# Patient Record
Sex: FEMALE | Race: WHITE | Employment: STUDENT | URBAN - METROPOLITAN AREA
[De-identification: names, ages, dates, MRNs, and addresses within clinical notes are randomized per-mention and may not be internally consistent; named-entity substitution may affect disease eponyms.]

---

## 2018-07-03 PROBLEM — O09.92 SUPERVISION OF HIGH RISK PREGNANCY IN SECOND TRIMESTER: Status: ACTIVE | Noted: 2018-07-03

## 2018-07-03 PROBLEM — O99.212 OBESITY AFFECTING PREGNANCY IN SECOND TRIMESTER: Status: ACTIVE | Noted: 2018-07-03

## 2018-07-03 PROBLEM — O09.30 LATE PRENATAL CARE: Status: ACTIVE | Noted: 2018-07-03

## 2018-07-07 PROBLEM — O09.899 MATERNAL VARICELLA, NON-IMMUNE: Status: ACTIVE | Noted: 2018-07-07

## 2018-07-07 PROBLEM — O09.899 RUBELLA NON-IMMUNE STATUS, ANTEPARTUM: Status: ACTIVE | Noted: 2018-07-07

## 2018-07-07 PROBLEM — Z28.39 RUBELLA NON-IMMUNE STATUS, ANTEPARTUM: Status: ACTIVE | Noted: 2018-07-07

## 2018-07-07 PROBLEM — Z28.39 MATERNAL VARICELLA, NON-IMMUNE: Status: ACTIVE | Noted: 2018-07-07

## 2018-08-21 PROBLEM — O09.93 SUPERVISION OF HIGH RISK PREGNANCY IN THIRD TRIMESTER: Status: ACTIVE | Noted: 2018-07-03

## 2018-08-24 ENCOUNTER — HOSPITAL ENCOUNTER (EMERGENCY)
Age: 23
Discharge: HOME | End: 2018-08-24
Attending: OBSTETRICS & GYNECOLOGY | Admitting: OBSTETRICS & GYNECOLOGY
Payer: MEDICAID

## 2018-08-24 ENCOUNTER — HOSPITAL ENCOUNTER (EMERGENCY)
Age: 23
Discharge: HOME | End: 2018-08-24
Attending: EMERGENCY MEDICINE
Payer: MEDICAID

## 2018-08-24 PROBLEM — K80.20 CHOLECYSTOLITHIASIS AFFECTING PREGNANCY: Status: ACTIVE | Noted: 2018-08-24

## 2018-08-24 PROBLEM — O99.619 CHOLECYSTOLITHIASIS AFFECTING PREGNANCY: Status: ACTIVE | Noted: 2018-08-24

## 2018-08-24 LAB
ALBUMIN SERPL-MCNC: 2.6 G/DL (ref 3.4–5)
ALBUMIN/GLOB SERPL: 0.6 {RATIO} (ref 0.8–1.7)
ALP SERPL-CCNC: 117 U/L (ref 45–117)
ALT SERPL-CCNC: 331 U/L (ref 13–56)
AMYLASE SERPL-CCNC: 44 U/L (ref 25–115)
ANION GAP SERPL CALC-SCNC: 7 MMOL/L (ref 3–18)
APPEARANCE UR: CLEAR
AST SERPL-CCNC: 97 U/L (ref 15–37)
BILIRUB SERPL-MCNC: 0.3 MG/DL (ref 0.2–1)
BILIRUB UR QL: NEGATIVE
BUN SERPL-MCNC: 7 MG/DL (ref 7–18)
BUN/CREAT SERPL: 8 (ref 12–20)
CALCIUM SERPL-MCNC: 8.8 MG/DL (ref 8.5–10.1)
CHLORIDE SERPL-SCNC: 105 MMOL/L (ref 100–108)
CO2 SERPL-SCNC: 27 MMOL/L (ref 21–32)
COLOR UR: NORMAL
CREAT SERPL-MCNC: 0.84 MG/DL (ref 0.6–1.3)
ERYTHROCYTE [DISTWIDTH] IN BLOOD BY AUTOMATED COUNT: 13.3 % (ref 11.6–14.5)
GLOBULIN SER CALC-MCNC: 4.4 G/DL (ref 2–4)
GLUCOSE SERPL-MCNC: 93 MG/DL (ref 74–99)
GLUCOSE UR QL STRIP.AUTO: NEGATIVE MG/DL
HCT VFR BLD AUTO: 32.1 % (ref 35–45)
HGB BLD-MCNC: 10.7 G/DL (ref 12–16)
KETONES UR-MCNC: NEGATIVE MG/DL
LDH SERPL L TO P-CCNC: 130 U/L (ref 81–234)
LEUKOCYTE ESTERASE UR QL STRIP: NEGATIVE
LIPASE SERPL-CCNC: 147 U/L (ref 73–393)
MCH RBC QN AUTO: 29.1 PG (ref 24–34)
MCHC RBC AUTO-ENTMCNC: 33.3 G/DL (ref 31–37)
MCV RBC AUTO: 87.2 FL (ref 74–97)
NITRITE UR QL: NEGATIVE
PH UR: 7 [PH] (ref 5–9)
PLATELET # BLD AUTO: 322 K/UL (ref 135–420)
PMV BLD AUTO: 10.2 FL (ref 9.2–11.8)
POTASSIUM SERPL-SCNC: 3.5 MMOL/L (ref 3.5–5.5)
PROT SERPL-MCNC: 7 G/DL (ref 6.4–8.2)
PROT UR QL: NEGATIVE MG/DL
RBC # BLD AUTO: 3.68 M/UL (ref 4.2–5.3)
RBC # UR STRIP: NEGATIVE /UL
SERVICE CMNT-IMP: NORMAL
SODIUM SERPL-SCNC: 139 MMOL/L (ref 136–145)
SP GR UR: 1.02 (ref 1–1.02)
URATE SERPL-MCNC: 3.6 MG/DL (ref 2.6–7.2)
UROBILINOGEN UR QL: 1 EU/DL (ref 0.2–1)
WBC # BLD AUTO: 9.2 K/UL (ref 4.6–13.2)

## 2018-08-24 PROCEDURE — 82239 BILE ACIDS TOTAL: CPT | Performed by: OBSTETRICS & GYNECOLOGY

## 2018-08-24 PROCEDURE — 59025 FETAL NON-STRESS TEST: CPT

## 2018-08-24 PROCEDURE — 84550 ASSAY OF BLOOD/URIC ACID: CPT | Performed by: OBSTETRICS & GYNECOLOGY

## 2018-08-24 PROCEDURE — 75810000275 HC EMERGENCY DEPT VISIT NO LEVEL OF CARE

## 2018-08-24 PROCEDURE — 80053 COMPREHEN METABOLIC PANEL: CPT | Performed by: OBSTETRICS & GYNECOLOGY

## 2018-08-24 PROCEDURE — 99284 EMERGENCY DEPT VISIT MOD MDM: CPT

## 2018-08-24 PROCEDURE — 82150 ASSAY OF AMYLASE: CPT | Performed by: OBSTETRICS & GYNECOLOGY

## 2018-08-24 PROCEDURE — 83615 LACTATE (LD) (LDH) ENZYME: CPT | Performed by: OBSTETRICS & GYNECOLOGY

## 2018-08-24 PROCEDURE — 85027 COMPLETE CBC AUTOMATED: CPT | Performed by: OBSTETRICS & GYNECOLOGY

## 2018-08-24 PROCEDURE — 74011250636 HC RX REV CODE- 250/636: Performed by: OBSTETRICS & GYNECOLOGY

## 2018-08-24 PROCEDURE — 83690 ASSAY OF LIPASE: CPT | Performed by: OBSTETRICS & GYNECOLOGY

## 2018-08-24 PROCEDURE — 96372 THER/PROPH/DIAG INJ SC/IM: CPT

## 2018-08-24 PROCEDURE — 81003 URINALYSIS AUTO W/O SCOPE: CPT

## 2018-08-24 RX ORDER — BETAMETHASONE SODIUM PHOSPHATE AND BETAMETHASONE ACETATE 3; 3 MG/ML; MG/ML
12 INJECTION, SUSPENSION INTRA-ARTICULAR; INTRALESIONAL; INTRAMUSCULAR; SOFT TISSUE EVERY 24 HOURS
Status: DISCONTINUED | OUTPATIENT
Start: 2018-08-24 | End: 2018-08-24 | Stop reason: HOSPADM

## 2018-08-24 RX ADMIN — BETAMETHASONE ACETATE AND BETAMETHASONE SODIUM PHOSPHATE 12 MG: 3; 3 INJECTION, SUSPENSION INTRA-ARTICULAR; INTRALESIONAL; INTRAMUSCULAR; SOFT TISSUE at 14:49

## 2018-08-24 NOTE — PROGRESS NOTES
0  28+4 weeks gestation sent from [facility] office for elevated LFT. Pt to get repeat LFTs, PIH, Lipase, Amylase, and bile acid, CBC, CMP, and uric acid. 200 Dr. Gaby Lutz at bedside discussing [address]. Pt to be discharged and returned tomorrow for 2nd dose of steriod. Given PIH precautions, FKC, and cholestasis in pregnancy.  discharge instructions given. Pt verbalized understanding and signed discharge instructions.  pt ambulated off unit.

## 2018-08-24 NOTE — PROGRESS NOTES
Pruritus of palms of hands and soles of feet with elevated liver enzymes; bile acids pending  NST reactive and appropriate for gestational age. Blood pressure WNL  All other labs WNL  First dose of Betamethasone administered today at 14:50  A/P: 28+ weeks with probable cholestasis of pregnancy  1) Will start on Ursodiol 300mg po TID empirically while awaiting bile acid results which should be in tomorrow 8/25  2) Pt. To be discharged home with f/u on labor and delivery tomorrow for steroids and bile acid results   3) Ursodiol should start to show improvement in pruritus symptoms within 1-2 weeks; biochemical improvements should be seen within about 3-4 weeks if bile acids do come back elevated making this truly cholestasis of pregnancy. Explained this to patient and mother and they both expressed understanding.

## 2018-08-24 NOTE — IP AVS SNAPSHOT
[address] [place] [address] 
[phone] Patient: Luis Head MRN: [id number] Pastor Torres About your hospitalization You were admitted on:  N/A [facility] [address] You were discharged on:  August 24, 2018 Why you were hospitalized Your primary diagnosis was:  Not on File Your diagnoses also included:  Cholecystolithiasis Affecting Pregnancy Follow-up Information None Your Scheduled Appointments Tuesday September 04, 2018  2:00 PM EDT  
OB VISIT with Dona Lu NP  
[facility] ([address] [place] [address]) [address] [place] [id number] [phone] Discharge Orders None A check manuela indicates which time of day the medication should be taken. My Medications ASK your doctor about these medications Instructions Each Dose to Equal  
 Morning Noon Evening Bedtime  
 amoxicillin 500 mg capsule Commonly known as:  AMOXIL Your last dose was: Your next dose is: Take 500 mg by mouth. 500 mg PNV Comb #2-Iron-FA-Omega 3 29-1-400 mg Cmpk Your last dose was: Your next dose is: Take  by mouth. Discharge Instructions Return to L&D triage Saturday at [phone] for second dose of steriod. Take medication as prescribed. Return to L&D triage if noticed decreased fetal movement, unexplained swelling, vision change or blurriness, right upper abdominal pain, severe headache, or hands/feet become more severe with itching. Cholestasis of Pregnancy: Care Instructions Your Care Instructions Cholestasis of pregnancy is a liver problem. It makes your skin very itchy. It happens when bile doesn't flow out of the liver very well. This problem doesn't cause any serious health problems for a pregnant woman. But it may cause problems for your baby. Your doctor will want to watch you and your baby closely. To keep you both as healthy as possible, your doctor may recommend an early delivery. Sometimes doctors also recommend medicine. Medicine can reduce bile acids. After a baby is born, this problem goes away. Follow-up care is a key part of your treatment and safety. Be sure to make and go to all appointments, and call your doctor if you are having problems. It's also a good idea to know your test results and keep a list of the medicines you take. How can you care for yourself at home? · Be safe with medicines. Take your medicines exactly as prescribed. Call your doctor if you think you are having a problem with your medicine. · If your doctor prescribes them, use creams or pills to help with itching. · Use calamine lotion on itchy areas. · Do not take hot showers or baths. Hot water can make itching worse. When should you call for help? Call your doctor now or seek immediate medical care if: 
  · Your itching gets worse or you get other symptoms.  
  · You think that you are in labor.  
  · There is a new or increasing yellow color to your skin or the whites of your eyes.  
 Watch closely for changes in your health, and be sure to contact your doctor if you have any questions or concerns. Where can you learn more? Go to http://pat-glen.info/. Enter E035 in the search box to learn more about \"Cholestasis of Pregnancy: Care Instructions. \" Current as of: November 21, 2017 Content Version: 11.7 © 9570-7371 Innotas. Care instructions adapted under license by PayPay (which disclaims liability or warranty for this information).  If you have questions about a medical condition or this instruction, always ask your healthcare professional. Horatio Habermann[employer] Incorporated disclaims any warranty or liability for your use of this information. Weeks 26 to 30 of Your Pregnancy: Care Instructions Your Care Instructions You are now in your last trimester of pregnancy. Your baby is growing rapidly. And you'll probably feel your baby moving around more often. Your doctor may ask you to count your baby's kicks. Your back may ache as your body gets used to your baby's size and length. If you haven't already had the Tdap shot during this pregnancy, talk to your doctor about getting it. It will help protect your  against pertussis infection. During this time, it's important to take care of yourself and pay attention to what your body needs. If you feel sexual, explore ways to be close with your partner that match your comfort and desire. Use the tips provided in this care sheet to find ways to be sexual in your own way. Follow-up care is a key part of your treatment and safety. Be sure to make and go to all appointments, and call your doctor if you are having problems. It's also a good idea to know your test results and keep a list of the medicines you take. How can you care for yourself at home? Take it easy at work · Take frequent breaks. If possible, stop working when you are tired, and rest during your lunch hour. · Take bathroom breaks every 2 hours. · Change positions often. If you sit for long periods, stand up and walk around. · When you stand for a long time, keep one foot on a low stool with your knee bent. After standing a lot, sit with your feet up. · Avoid fumes, chemicals, and tobacco smoke. Be sexual in your own way · Having sex during pregnancy is okay, unless your doctor tells you not to. · You may be very interested in sex, or you may have no interest at all. · Your growing belly can make it hard to find a good position during intercourse. [place] and explore.  
· You may get cramps in your uterus when your partner touches your breasts. · A back rub may relieve the backache or cramps that sometimes follow orgasm. Learn about  labor · Watch for signs of  labor. You may be going into labor if: 
¨ You have menstrual-like cramps, with or without nausea. ¨ You have about 6 or more contractions in 1 hour, even after you have had a glass of water and are resting. ¨ You have a low, dull backache that does not go away when you change your position. ¨ You have pain or pressure in your pelvis that comes and goes in a pattern. ¨ You have intestinal cramping or flu-like symptoms, with or without diarrhea. ¨ You notice an increase or change in your vaginal discharge. Discharge may be heavy, mucus-like, watery, or streaked with blood. ¨ Your water breaks. · If you think you have  labor: ¨ Drink 2 or 3 glasses of water or juice. Not drinking enough fluids can cause contractions. ¨ Stop what you are doing, and empty your bladder. Then lie down on your left side for at least 1 hour. ¨ While lying on your side, find your breast bone. Put your fingers in the soft spot just below it. Move your fingers down toward your belly button to find the top of your uterus. Check to see if it is tight. ¨ Contractions can be weak or strong. Record your contractions for an hour. Time a contraction from the start of one contraction to the start of the next one. ¨ Single or several strong contractions without a pattern are called [place] contractions. They are practice contractions but not the start of labor. They often stop if you change what you are doing. ¨ Call your doctor if you have regular contractions. Where can you learn more? Go to http://pat-glen.info/. Enter M996 in the search box to learn more about \"Weeks 26 to 30 of Your Pregnancy: Care Instructions. \" Current as of: 2017 Content Version: 11.7 © 3942-4313 LoveLab.com INC., Incorporated.  Care instructions adapted under license by [address] (which disclaims liability or warranty for this information). If you have questions about a medical condition or this instruction, always ask your healthcare professional. [address] any warranty or liability for your use of this information. Counting Your Baby's Kicks: Care Instructions Your Care Instructions Counting your baby's kicks is one way your doctor can tell that your baby is healthy. Most women-especially in a first pregnancy-feel their baby move for the first time between 16 and 22 weeks. The movement may feel like flutters rather than kicks. Your baby may move more at certain times of the day. When you are active, you may notice less kicking than when you are resting. At your prenatal visits, your doctor will ask whether the baby is active. In your last trimester, your doctor may ask you to count the number of times you feel your baby move. Follow-up care is a key part of your treatment and safety. Be sure to make and go to all appointments, and call your doctor if you are having problems. It's also a good idea to know your test results and keep a list of the medicines you take. How do you count fetal kicks? · A common method of checking your baby's movement is to count the number of kicks or moves you feel in 1 hour. Ten movements (such as kicks, flutters, or rolls) in 1 hour are normal. Some doctors suggest that you count in the morning until you get to 10 movements. Then you can quit for that day and start again the next day. · Pick your baby's most active time of day to count. This may be any time from morning to evening. · If you do not feel 10 movements in an hour, your baby may be sleeping. Wait for the next hour and count again. When should you call for help?  
Call your doctor now or seek immediate medical care if: 
  · You noticed that your baby has stopped moving or is moving much less than normal.  
  Watch closely for changes in your health, and be sure to contact your doctor if you have any problems. Where can you learn more? Go to http://pat-glen.info/. Enter L282 in the search box to learn more about \"Counting Your Baby's Kicks: Care Instructions. \" Current as of: 2017 Content Version: 11.7 © 8849-6843 RiffTrax. Care instructions adapted under license by Rhomania (which disclaims liability or warranty for this information). If you have questions about a medical condition or this instruction, always ask your healthcare professional. [place] [address] any warranty or liability for your use of this information.  Labor: Care Instructions Your Care Instructions  labor is the start of labor between 20 and 36 weeks of pregnancy. A full-term pregnancy lasts 37 to 42 weeks. In labor, the uterus contracts to open the cervix. This is the first stage of childbirth.  labor can be caused by a problem with the baby, the mother, or both. Often the cause is not known. In some cases, doctors use medicines to try to delay labor until 29 or more weeks of pregnancy. By this time, a baby has grown enough so that problems are not likely. In some cases-such as with a serious infection-it is healthier for the baby to be born early. Your treatment will depend on how far along you are in your pregnancy and on your health and your baby's health. Follow-up care is a key part of your treatment and safety. Be sure to make and go to all appointments, and call your doctor if you are having problems. It's also a good idea to know your test results and keep a list of the medicines you take. How can you care for yourself at home? · If your doctor prescribed medicines, take them exactly as directed. Call your doctor if you think you are having a problem with your medicine. · Rest until your doctor advises you about activity. He or she will tell you if you should stay in bed most of the time. You may need to arrange for  if you have young children. · Do not have sexual intercourse unless your doctor says it is safe. · Use pads, not tampons, if you have vaginal bleeding. · Make sure to drink plenty of fluids. Dehydration can lead to contractions. If you have kidney, heart, or liver disease and have to limit fluids, talk with your doctor before you increase the amount of fluids you drink. · Do not smoke or allow others to smoke around you. If you need help quitting, talk to your doctor about stop-smoking programs and medicines. These can increase your chances of quitting for good. When should you call for help? Call 911 anytime you think you may need emergency care. For example, call if: 
  · You passed out (lost consciousness).  
  · You have severe vaginal bleeding.  
  · You have severe pain in your belly or pelvis.  
  · You have had fluid gushing or leaking from your vagina and you know or think the umbilical cord is bulging into your vagina. If this happens, immediately get down on your knees so your rear end (buttocks) is higher than your head. This will decrease the pressure on the cord until help arrives.  
[facility] your doctor now or seek immediate medical care if: 
  · You have signs of preeclampsia, such as: 
¨ Sudden swelling of your face, hands, or feet. ¨ New vision problems (such as dimness or blurring). ¨ A severe headache.  
  · You have any vaginal bleeding.  
  · You have belly pain or cramping.  
  · You have a fever.  
  · You have had regular contractions (with or without pain) for an hour. This means that you have 6 or more within 1 hour after you change your position and drink fluids.  
  · You have a sudden release of fluid from the vagina.  
  · You have low back pain or pelvic pressure that does not go away.   · You notice that your baby has stopped moving or is moving much less than normal.  
 Watch closely for changes in your health, and be sure to contact your doctor if you have any problems. Where can you learn more? Go to http://pat-glen.info/. Enter Q400 in the search box to learn more about \" Labor: Care Instructions. \" Current as of: 2017 Content Version: 11.7 © 0028-7831 Crowd Technologies. Care instructions adapted under license by RentJuice (which disclaims liability or warranty for this information). If you have questions about a medical condition or this instruction, always ask your healthcare professional. [address] any warranty or liability for your use of this information. Introducing [facility]! Radha Sosa introduces [employer] patient portal. Now you can access parts of your medical record, email your doctor's office, and request medication refills online. 1. In your internet browser, go to [email or web address] 2. Click on the First Time User? Click Here link in the Sign In box. You will see the New Member Sign Up page. 3. Enter your [employer] Access Code exactly as it appears below. You will not need to use this code after youve completed the sign-up process. If you do not sign up before the expiration date, you must request a new code. · [employer] Access Code: [email or web address] Expires: 10/1/2018  2:39 PM 
 
4. Enter the last four digits of your Social Security Number (xxxx) and Date of Birth (mm/dd/yyyy) as indicated and click Submit. You will be taken to the next sign-up page. 5. Create a Affinityt ID. This will be your [employer] login ID and cannot be changed, so think of one that is secure and easy to remember. 6. Create a [employer] password. You can change your password at any time. 7. Enter your Password Reset Question and Answer.  This can be used at a later time if you forget your password. 8. Enter your e-mail address. You will receive e-mail notification when new information is available in [address] 9. Click Sign Up. You can now view and download portions of your medical record. 10. Click the Download Summary menu link to download a portable copy of your medical information. If you have questions, please visit the Frequently Asked Questions section of the Action Products Internationalt website. Remember, Waffl.com is NOT to be used for urgent needs. For medical emergencies, dial 911. Now available from your iPhone and Android! Introducing [facility] As a [employer] patient, I wanted to make you aware of our electronic visit tool called [place]. [employer] allows you to connect within minutes with a medical provider 24 hours a day, seven days a week via a mobile device or tablet or logging into a secure website from your computer. You can access [place] from anywhere in the [place]. A virtual visit might be right for you when you have a simple condition and feel like you just dont want to get out of bed, or cant get away from work for an appointment, when your regular [employer] [facility] [facility] provider is not available (evenings, weekends or holidays), or when youre out of town and need minor care. Electronic visits cost only $49 and if the [employer] provider determines a prescription is needed to treat your condition, one can be electronically transmitted to a nearby pharmacy*. Please take a moment to enroll today if you have not already done so. The enrollment process is free and takes just a few minutes. To enroll, please download the [employer] sherman to your tablet or phone, or visit [email or web address] to enroll on your computer.    
And, as an [address] [place] [address] patient with a [employer] account, the results of your visits will be scanned into your electronic medical record and your primary care provider will be able to view the scanned results. We urge you to continue to see your regular Jenna Briseno provider for your ongoing medical care. And while your primary care provider may not be the one available when you seek a [place] [employer] virtual visit, the peace of mind you get from getting a real diagnosis real time can be priceless. For more information on Los [employer], view our Frequently Asked Questions (FAQs) at [email or web address]. Sincerely, 
 
Teri Cadena MD 
Chief Medical Officer [address] Bas[address] *:  certain medications cannot be prescribed via [employer] Unresulted Labs-Please follow up with your PCP about these lab tests Order Current Status BILE ACIDS, TOTAL In process Providers Seen During Your Hospitalization Provider Specialty Primary office phone Demian Norris MD Obstetrics & Gynecology [phone] Your Primary Care Physician (PCP) Primary Care Physician Office Phone Office Fax NONE ** None ** ** None ** You are allergic to the following No active allergies Recent Documentation OB Status Smoking Status Pregnant Never Smoker Emergency Contacts Name Discharge Info Relation Home Work Mobile Lauren Prather DISCHARGE CAREGIVER [3] Mother [14] [phone] Patient Belongings The following personal items are in your possession at time of discharge: 
                             
 
  
  
 Please provide this summary of care documentation to your next provider. Signatures-by signing, you are acknowledging that this After Visit Summary has been reviewed with you and you have received a copy. Patient Signature:  ____________________________________________________________ Date:  ____________________________________________________________  
  
Rashawn Chavez  Provider Signature: ____________________________________________________________ Date:  ____________________________________________________________

## 2018-08-24 NOTE — IP AVS SNAPSHOT
Summary of Care Report The Summary of Care report has been created to help improve care coordination. Users with access to [employer] or [address] (Web-based application) may access additional patient information including the Discharge Summary. If you are not currently a [address] user and need more information, please call the number listed below in the [place] [address] section and ask to be connected with Medical Records. Facility Information Name Address Phone [address] [address] [phone] Patient Information Patient Name Sex DANIEL Bowman ([id number]) Female 1995 Discharge Information Admitting Provider Service Area Unit Fariha Olivarez MD / [address] L&D / [phone] Discharge Provider Discharge Date/Time Discharge Disposition Destination (none) (none) (none) (none) Patient Language Language ENGLISH [13] Hospital Problems as of 2018  Reviewed: 2018 10:22 AM by Chantelle Vasquez NP Class Noted - Resolved Last Modified POA Active Problems Cholecystolithiasis affecting pregnancy  2018 - Present 2018 by Fariha Olivarez MD Unknown Entered by Fariha Olivarez MD  
  
Non-Hospital Problems as of 2018  Reviewed: 2018 10:22 AM by Chantelle Vasquez NP Class Noted - Resolved Last Modified Active Problems Obesity affecting pregnancy in second trimester  7/3/2018 - Present 7/10/2018 by Chantelle Vasquez NP Entered by Chantelle Vasquez NP Overview Addendum 7/10/2018  4:43 PM by Chantelle Vasquez NP Early 1 hr GTT - 97 NST's biweekly at 37 weeks Supervision of high risk pregnancy in third trimester  7/3/2018 - Present 2018 by Chantelle Vasquez NP   Entered by Chantelle Vasquez NP  
 Late prenatal care  7/3/2018 - Present 7/9/2018 by Erika Alanis MD  
  Entered by Phyllis Odonnell NP Overview Signed 7/9/2018  2:01 PM by Erika Alanis MD  
   7/6/18  Female fetus. EFW 89%., LILLIANA 11/12/18. Rubella non-immune status, antepartum  7/7/2018 - Present 7/7/2018 by Phyllis Odonnell NP Entered by Phyllis Odonnell NP Maternal varicella, non-immune  7/7/2018 - Present 7/7/2018 by Phyllis Odonnell NP Entered by Phyllis Odonnell NP You are allergic to the following No active allergies Current Discharge Medication List  
  
ASK your doctor about these medications Dose & Instructions Dispensing Information Comments  
 amoxicillin 500 mg capsule Commonly known as:  AMOXIL Dose:  500 mg Take 500 mg by mouth. Refills:  0  
   
 PNV Comb #2-Iron-FA-Omega 3 29-1-400 mg Cmpk Take  by mouth. Refills:  0 Current Immunizations Name Date Tdap 8/21/2018 Follow-up Information None Discharge Instructions Return to L&D triage Saturday at [phone] 90 for second dose of steriod. Take medication as prescribed. Return to L&D triage if noticed decreased fetal movement, unexplained swelling, vision change or blurriness, right upper abdominal pain, severe headache, or hands/feet become more severe with itching. Cholestasis of Pregnancy: Care Instructions Your Care Instructions Cholestasis of pregnancy is a liver problem. It makes your skin very itchy. It happens when bile doesn't flow out of the liver very well. This problem doesn't cause any serious health problems for a pregnant woman. But it may cause problems for your baby. Your doctor will want to watch you and your baby closely. To keep you both as healthy as possible, your doctor may recommend an early delivery. Sometimes doctors also recommend medicine. Medicine can reduce bile acids. After a baby is born, this problem goes away. Follow-up care is a key part of your treatment and safety. Be sure to make and go to all appointments, and call your doctor if you are having problems. It's also a good idea to know your test results and keep a list of the medicines you take. How can you care for yourself at home? · Be safe with medicines. Take your medicines exactly as prescribed. Call your doctor if you think you are having a problem with your medicine. · If your doctor prescribes them, use creams or pills to help with itching. · Use calamine lotion on itchy areas. · Do not take hot showers or baths. Hot water can make itching worse. When should you call for help? Call your doctor now or seek immediate medical care if: 
  · Your itching gets worse or you get other symptoms.  
  · You think that you are in labor.  
  · There is a new or increasing yellow color to your skin or the whites of your eyes.  
 Watch closely for changes in your health, and be sure to contact your doctor if you have any questions or concerns. Where can you learn more? Go to http://pat-glen.info/. Enter H104 in the search box to learn more about \"Cholestasis of Pregnancy: Care Instructions. \" Current as of: November 21, 2017 Content Version: 11.7 © 4335-9582 [employer]. Care instructions adapted under license by [employer] (which disclaims liability or warranty for this information). If you have questions about a medical condition or this instruction, always ask your healthcare professional. [place] [address] any warranty or liability for your use of this information. Weeks 26 to 30 of Your Pregnancy: Care Instructions Your Care Instructions You are now in your last trimester of pregnancy. Your baby is growing rapidly. And you'll probably feel your baby moving around more often. Your doctor may ask you to count your baby's kicks. Your back may ache as your body gets used to your baby's size and length. If you haven't already had the Tdap shot during this pregnancy, talk to your doctor about getting it. It will help protect your  against pertussis infection. During this time, it's important to take care of yourself and pay attention to what your body needs. If you feel sexual, explore ways to be close with your partner that match your comfort and desire. Use the tips provided in this care sheet to find ways to be sexual in your own way. Follow-up care is a key part of your treatment and safety. Be sure to make and go to all appointments, and call your doctor if you are having problems. It's also a good idea to know your test results and keep a list of the medicines you take. How can you care for yourself at home? Take it easy at work · Take frequent breaks. If possible, stop working when you are tired, and rest during your lunch hour. · Take bathroom breaks every 2 hours. · Change positions often. If you sit for long periods, stand up and walk around. · When you stand for a long time, keep one foot on a low stool with your knee bent. After standing a lot, sit with your feet up. · Avoid fumes, chemicals, and tobacco smoke. Be sexual in your own way · Having sex during pregnancy is okay, unless your doctor tells you not to. · You may be very interested in sex, or you may have no interest at all. · Your growing belly can make it hard to find a good position during intercourse. [place] and explore. · You may get cramps in your uterus when your partner touches your breasts. · A back rub may relieve the backache or cramps that sometimes follow orgasm. Learn about  labor · Watch for signs of  labor. You may be going into labor if: 
¨ You have menstrual-like cramps, with or without nausea. ¨ You have about 6 or more contractions in 1 hour, even after you have had a glass of water and are resting. ¨ You have a low, dull backache that does not go away when you change your position. ¨ You have pain or pressure in your pelvis that comes and goes in a pattern. ¨ You have intestinal cramping or flu-like symptoms, with or without diarrhea. ¨ You notice an increase or change in your vaginal discharge. Discharge may be heavy, mucus-like, watery, or streaked with blood. ¨ Your water breaks. · If you think you have  labor: ¨ Drink 2 or 3 glasses of water or juice. Not drinking enough fluids can cause contractions. ¨ Stop what you are doing, and empty your bladder. Then lie down on your left side for at least 1 hour. ¨ While lying on your side, find your breast bone. Put your fingers in the soft spot just below it. Move your fingers down toward your belly button to find the top of your uterus. Check to see if it is tight. ¨ Contractions can be weak or strong. Record your contractions for an hour. Time a contraction from the start of one contraction to the start of the next one. ¨ Single or several strong contractions without a pattern are called Randell-Holly contractions. They are practice contractions but not the start of labor. They often stop if you change what you are doing. ¨ Call your doctor if you have regular contractions. Where can you learn more? Go to http://pat-glen.info/. Enter K630 in the search box to learn more about \"Weeks 26 to 30 of Your Pregnancy: Care Instructions. \" Current as of: 2017 Content Version: 11.7 © 3429-7864 [employer]. Care instructions adapted under license by [employer] (which disclaims liability or warranty for this information). If you have questions about a medical condition or this instruction, always ask your healthcare professional. [place] [address] any warranty or liability for your use of this information. Counting Your Baby's Kicks: Care Instructions Your Care Instructions Counting your baby's kicks is one way your doctor can tell that your baby is healthy. Most women-especially in a first pregnancy-feel their baby move for the first time between 16 and 22 weeks. The movement may feel like flutters rather than kicks. Your baby may move more at certain times of the day. When you are active, you may notice less kicking than when you are resting. At your prenatal visits, your doctor will ask whether the baby is active. In your last trimester, your doctor may ask you to count the number of times you feel your baby move. Follow-up care is a key part of your treatment and safety. Be sure to make and go to all appointments, and call your doctor if you are having problems. It's also a good idea to know your test results and keep a list of the medicines you take. How do you count fetal kicks? · A common method of checking your baby's movement is to count the number of kicks or moves you feel in 1 hour. Ten movements (such as kicks, flutters, or rolls) in 1 hour are normal. Some doctors suggest that you count in the morning until you get to 10 movements. Then you can quit for that day and start again the next day. · Pick your baby's most active time of day to count. This may be any time from morning to evening. · If you do not feel 10 movements in an hour, your baby may be sleeping. Wait for the next hour and count again. When should you call for help? Call your doctor now or seek immediate medical care if: 
  · You noticed that your baby has stopped moving or is moving much less than normal.  
 Watch closely for changes in your health, and be sure to contact your doctor if you have any problems. Where can you learn more? Go to http://pat-glen.info/. Enter T390 in the search box to learn more about \"Counting Your Baby's Kicks: Care Instructions. \" Current as of: November 21, 2017 Content Version: 11.7 © 7819-3155 [employer]. Care instructions adapted under license by [employer] (which disclaims liability or warranty for this information). If you have questions about a medical condition or this instruction, always ask your healthcare professional. [address] any warranty or liability for your use of this information.  Labor: Care Instructions Your Care Instructions  labor is the start of labor between 20 and 36 weeks of pregnancy. A full-term pregnancy lasts 37 to 42 weeks. In labor, the uterus contracts to open the cervix. This is the first stage of childbirth.  labor can be caused by a problem with the baby, the mother, or both. Often the cause is not known. In some cases, doctors use medicines to try to delay labor until 29 or more weeks of pregnancy. By this time, a baby has grown enough so that problems are not likely. In some cases-such as with a serious infection-it is healthier for the baby to be born early. Your treatment will depend on how far along you are in your pregnancy and on your health and your baby's health. Follow-up care is a key part of your treatment and safety. Be sure to make and go to all appointments, and call your doctor if you are having problems. It's also a good idea to know your test results and keep a list of the medicines you take. How can you care for yourself at home? · If your doctor prescribed medicines, take them exactly as directed. Call your doctor if you think you are having a problem with your medicine. · Rest until your doctor advises you about activity. He or she will tell you if you should stay in bed most of the time. You may need to arrange for  if you have young children. · Do not have sexual intercourse unless your doctor says it is safe. · Use pads, not tampons, if you have vaginal bleeding. · Make sure to drink plenty of fluids.  Dehydration can lead to contractions. If you have kidney, heart, or liver disease and have to limit fluids, talk with your doctor before you increase the amount of fluids you drink. · Do not smoke or allow others to smoke around you. If you need help quitting, talk to your doctor about stop-smoking programs and medicines. These can increase your chances of quitting for good. When should you call for help? Call 911 anytime you think you may need emergency care. For example, call if: 
  · You passed out (lost consciousness).  
  · You have severe vaginal bleeding.  
  · You have severe pain in your belly or pelvis.  
  · You have had fluid gushing or leaking from your vagina and you know or think the umbilical cord is bulging into your vagina. If this happens, immediately get down on your knees so your rear end (buttocks) is higher than your head. This will decrease the pressure on the cord until help arrives.  
[facility] your doctor now or seek immediate medical care if: 
  · You have signs of preeclampsia, such as: 
¨ Sudden swelling of your face, hands, or feet. ¨ New vision problems (such as dimness or blurring). ¨ A severe headache.  
  · You have any vaginal bleeding.  
  · You have belly pain or cramping.  
  · You have a fever.  
  · You have had regular contractions (with or without pain) for an hour. This means that you have 6 or more within 1 hour after you change your position and drink fluids.  
  · You have a sudden release of fluid from the vagina.  
  · You have low back pain or pelvic pressure that does not go away.  
  · You notice that your baby has stopped moving or is moving much less than normal.  
 Watch closely for changes in your health, and be sure to contact your doctor if you have any problems. Where can you learn more? Go to http://pat-glen.info/. Enter Q400 in the search box to learn more about \" Labor: Care Instructions. \" Current as of: 2017 Content Version: 11.7 © 7196-6284 [employer]. Care instructions adapted under license by [employer] (which disclaims liability or warranty for this information). If you have questions about a medical condition or this instruction, always ask your healthcare professional. [address] any warranty or liability for your use of this information. Chart Review Routing History No Routing History on File

## 2018-08-24 NOTE — DISCHARGE INSTRUCTIONS
Return to L&D triage Saturday at [address] for second dose of steriod. Take medication as prescribed. Return to L&D triage if noticed decreased fetal movement, unexplained swelling, vision change or blurriness, right upper abdominal pain, severe headache, or hands/feet become more severe with itching. Cholestasis of Pregnancy: Care Instructions  Your Care Instructions    Cholestasis of pregnancy is a liver problem. It makes your skin very itchy. It happens when bile doesn't flow out of the liver very well. This problem doesn't cause any serious health problems for a pregnant woman. But it may cause problems for your baby. Your doctor will want to watch you and your baby closely. To keep you both as healthy as possible, your doctor may recommend an early delivery. Sometimes doctors also recommend medicine. Medicine can reduce bile acids. After a baby is born, this problem goes away. Follow-up care is a key part of your treatment and safety. Be sure to make and go to all appointments, and call your doctor if you are having problems. It's also a good idea to know your test results and keep a list of the medicines you take. How can you care for yourself at home? · Be safe with medicines. Take your medicines exactly as prescribed. Call your doctor if you think you are having a problem with your medicine. · If your doctor prescribes them, use creams or pills to help with itching. · Use calamine lotion on itchy areas. · Do not take hot showers or baths. Hot water can make itching worse. When should you call for help? Call your doctor now or seek immediate medical care if:    · Your itching gets worse or you get other symptoms.     · You think that you are in labor.     · There is a new or increasing yellow color to your skin or the whites of your eyes.    Watch closely for changes in your health, and be sure to contact your doctor if you have any questions or concerns. Where can you learn more?   Go to http://pat-glen.info/. Enter Z217 in the search box to learn more about \"Cholestasis of Pregnancy: Care Instructions. \"  Current as of: 2017  Content Version: 11.7  © 9120-4296 [employer]. Care instructions adapted under license by [employer] (which disclaims liability or warranty for this information). If you have questions about a medical condition or this instruction, always ask your healthcare professional. [place] [address] any warranty or liability for your use of this information. Weeks 26 to 30 of Your Pregnancy: Care Instructions  Your Care Instructions    You are now in your last trimester of pregnancy. Your baby is growing rapidly. And you'll probably feel your baby moving around more often. Your doctor may ask you to count your baby's kicks. Your back may ache as your body gets used to your baby's size and length. If you haven't already had the Tdap shot during this pregnancy, talk to your doctor about getting it. It will help protect your  against pertussis infection. During this time, it's important to take care of yourself and pay attention to what your body needs. If you feel sexual, explore ways to be close with your partner that match your comfort and desire. Use the tips provided in this care sheet to find ways to be sexual in your own way. Follow-up care is a key part of your treatment and safety. Be sure to make and go to all appointments, and call your doctor if you are having problems. It's also a good idea to know your test results and keep a list of the medicines you take. How can you care for yourself at home? Take it easy at work  · Take frequent breaks. If possible, stop working when you are tired, and rest during your lunch hour. · Take bathroom breaks every 2 hours. · Change positions often. If you sit for long periods, stand up and walk around.   · When you stand for a long time, keep one foot on a low stool with your knee bent. After standing a lot, sit with your feet up. · Avoid fumes, chemicals, and tobacco smoke. Be sexual in your own way  · Having sex during pregnancy is okay, unless your doctor tells you not to. · You may be very interested in sex, or you may have no interest at all. · Your growing belly can make it hard to find a good position during intercourse. [place] and explore. · You may get cramps in your uterus when your partner touches your breasts. · A back rub may relieve the backache or cramps that sometimes follow orgasm. Learn about  labor  · Watch for signs of  labor. You may be going into labor if:  ¨ You have menstrual-like cramps, with or without nausea. ¨ You have about 6 or more contractions in 1 hour, even after you have had a glass of water and are resting. ¨ You have a low, dull backache that does not go away when you change your position. ¨ You have pain or pressure in your pelvis that comes and goes in a pattern. ¨ You have intestinal cramping or flu-like symptoms, with or without diarrhea. ¨ You notice an increase or change in your vaginal discharge. Discharge may be heavy, mucus-like, watery, or streaked with blood. ¨ Your water breaks. · If you think you have  labor:  ¨ Drink 2 or 3 glasses of water or juice. Not drinking enough fluids can cause contractions. ¨ Stop what you are doing, and empty your bladder. Then lie down on your left side for at least 1 hour. ¨ While lying on your side, find your breast bone. Put your fingers in the soft spot just below it. Move your fingers down toward your belly button to find the top of your uterus. Check to see if it is tight. ¨ Contractions can be weak or strong. Record your contractions for an hour. Time a contraction from the start of one contraction to the start of the next one. ¨ Single or several strong contractions without a pattern are called Randell-Holly contractions.  They are practice contractions but not the start of labor. They often stop if you change what you are doing. ¨ Call your doctor if you have regular contractions. Where can you learn more? Go to http://pat-glen.info/. Enter U428 in the search box to learn more about \"Weeks 26 to 30 of Your Pregnancy: Care Instructions. \"  Current as of: November 21, 2017  Content Version: 11.7  © 2589-9200 [employer]. Care instructions adapted under license by [employer] (which disclaims liability or warranty for this information). If you have questions about a medical condition or this instruction, always ask your healthcare professional. [place] [address] any warranty or liability for your use of this information. Counting Your Baby's Kicks: Care Instructions  Your Care Instructions    Counting your baby's kicks is one way your doctor can tell that your baby is healthy. Most women-especially in a first pregnancy-feel their baby move for the first time between 16 and 22 weeks. The movement may feel like flutters rather than kicks. Your baby may move more at certain times of the day. When you are active, you may notice less kicking than when you are resting. At your prenatal visits, your doctor will ask whether the baby is active. In your last trimester, your doctor may ask you to count the number of times you feel your baby move. Follow-up care is a key part of your treatment and safety. Be sure to make and go to all appointments, and call your doctor if you are having problems. It's also a good idea to know your test results and keep a list of the medicines you take. How do you count fetal kicks? · A common method of checking your baby's movement is to count the number of kicks or moves you feel in 1 hour. Ten movements (such as kicks, flutters, or rolls) in 1 hour are normal. Some doctors suggest that you count in the morning until you get to 10 movements. Then you can quit for that day and start again the next day. · Pick your baby's most active time of day to count. This may be any time from morning to evening. · If you do not feel 10 movements in an hour, your baby may be sleeping. Wait for the next hour and count again. When should you call for help? Call your doctor now or seek immediate medical care if:    · You noticed that your baby has stopped moving or is moving much less than normal.    Watch closely for changes in your health, and be sure to contact your doctor if you have any problems. Where can you learn more? Go to http://pat-glen.info/. Enter J857 in the search box to learn more about \"Counting Your Baby's Kicks: Care Instructions. \"  Current as of: 2017  Content Version: 11.7  © 2397-3491 [employer]. Care instructions adapted under license by [employer] (which disclaims liability or warranty for this information). If you have questions about a medical condition or this instruction, always ask your healthcare professional. [place] [address] any warranty or liability for your use of this information.  Labor: Care Instructions  Your Care Instructions     labor is the start of labor between 21 and 36 weeks of pregnancy. A full-term pregnancy lasts 37 to 42 weeks. In labor, the uterus contracts to open the cervix. This is the first stage of childbirth.  labor can be caused by a problem with the baby, the mother, or both. Often the cause is not known. In some cases, doctors use medicines to try to delay labor until 29 or more weeks of pregnancy. By this time, a baby has grown enough so that problems are not likely. In some cases-such as with a serious infection-it is healthier for the baby to be born early. Your treatment will depend on how far along you are in your pregnancy and on your health and your baby's health.   Follow-up care is a key part of your treatment and safety. Be sure to make and go to all appointments, and call your doctor if you are having problems. It's also a good idea to know your test results and keep a list of the medicines you take. How can you care for yourself at home? · If your doctor prescribed medicines, take them exactly as directed. Call your doctor if you think you are having a problem with your medicine. · Rest until your doctor advises you about activity. He or she will tell you if you should stay in bed most of the time. You may need to arrange for  if you have young children. · Do not have sexual intercourse unless your doctor says it is safe. · Use pads, not tampons, if you have vaginal bleeding. · Make sure to drink plenty of fluids. Dehydration can lead to contractions. If you have kidney, heart, or liver disease and have to limit fluids, talk with your doctor before you increase the amount of fluids you drink. · Do not smoke or allow others to smoke around you. If you need help quitting, talk to your doctor about stop-smoking programs and medicines. These can increase your chances of quitting for good. When should you call for help? Call 911 anytime you think you may need emergency care. For example, call if:    · You passed out (lost consciousness).     · You have severe vaginal bleeding.     · You have severe pain in your belly or pelvis.     · You have had fluid gushing or leaking from your vagina and you know or think the umbilical cord is bulging into your vagina. If this happens, immediately get down on your knees so your rear end (buttocks) is higher than your head. This will decrease the pressure on the cord until help arrives.   [facility] your doctor now or seek immediate medical care if:    · You have signs of preeclampsia, such as:  ¨ Sudden swelling of your face, hands, or feet. ¨ New vision problems (such as dimness or blurring).   ¨ A severe headache.     · You have any vaginal bleeding.     · You have belly pain or cramping.     · You have a fever.     · You have had regular contractions (with or without pain) for an hour. This means that you have 6 or more within 1 hour after you change your position and drink fluids.     · You have a sudden release of fluid from the vagina.     · You have low back pain or pelvic pressure that does not go away.     · You notice that your baby has stopped moving or is moving much less than normal.    Watch closely for changes in your health, and be sure to contact your doctor if you have any problems. Where can you learn more? Go to http://pat-glen.info/. Enter Q400 in the search box to learn more about \" Labor: Care Instructions. \"  Current as of: 2017  Content Version: 11.7  © 8202-3614 [employer]. Care instructions adapted under license by [employer] (which disclaims liability or warranty for this information). If you have questions about a medical condition or this instruction, always ask your healthcare professional. [address] any warranty or liability for your use of this information.

## 2018-08-25 ENCOUNTER — HOSPITAL ENCOUNTER (EMERGENCY)
Age: 23
Discharge: HOME | End: 2018-08-25
Attending: OBSTETRICS & GYNECOLOGY | Admitting: OBSTETRICS & GYNECOLOGY
Payer: MEDICAID

## 2018-08-25 VITALS — HEIGHT: 65 IN | BODY MASS INDEX: 34.82 KG/M2 | WEIGHT: 209 LBS

## 2018-08-25 LAB — BILE AC SERPL-SCNC: 41.9 UMOL/L (ref 4.7–24.5)

## 2018-08-25 PROCEDURE — 96372 THER/PROPH/DIAG INJ SC/IM: CPT

## 2018-08-25 PROCEDURE — 74011250636 HC RX REV CODE- 250/636: Performed by: ADVANCED PRACTICE MIDWIFE

## 2018-08-25 PROCEDURE — 59025 FETAL NON-STRESS TEST: CPT

## 2018-08-25 RX ORDER — BETAMETHASONE SODIUM PHOSPHATE AND BETAMETHASONE ACETATE 3; 3 MG/ML; MG/ML
12 INJECTION, SUSPENSION INTRA-ARTICULAR; INTRALESIONAL; INTRAMUSCULAR; SOFT TISSUE ONCE
Status: COMPLETED | OUTPATIENT
Start: 2018-08-25 | End: 2018-08-25

## 2018-08-25 RX ORDER — URSODIOL 300 MG/1
300 CAPSULE ORAL 3 TIMES DAILY
COMMUNITY
End: 2018-09-20 | Stop reason: SDUPTHER

## 2018-08-25 RX ADMIN — BETAMETHASONE ACETATE AND BETAMETHASONE SODIUM PHOSPHATE 12 MG: 3; 3 INJECTION, SUSPENSION INTRA-ARTICULAR; INTRALESIONAL; INTRAMUSCULAR; SOFT TISSUE at 15:44

## 2018-08-25 NOTE — PROGRESS NOTES
1520 Bedside and verbal report received from Мария Azul RN. Assumed care of patient at this time. [phone] Page sent to VANDANA Dougherty via [place]. Prompt return call received. SBAR report given. Orders received. 1550 Patient discharged and off unit accompanied by her mother.

## 2018-08-25 NOTE — PROGRESS NOTES
1500 Pt arrived  at 28w3d with C/O NST placed in bed on EFM oriented to room with call bell in reach    1520 Bedside and Verbal shift change report given to UMANG Dent RN (oncoming nurse) by Ankur Jett RN (offgoing nurse). Report included the following information SBAR, Intake/Output, MAR, Accordion, Recent Results and Med Rec Status.

## 2018-08-26 ENCOUNTER — HOSPITAL ENCOUNTER (EMERGENCY)
Age: 23
Discharge: HOME | End: 2018-08-26
Attending: OBSTETRICS & GYNECOLOGY | Admitting: OBSTETRICS & GYNECOLOGY
Payer: MEDICAID

## 2018-08-26 PROBLEM — O99.619 CHOLECYSTOLITHIASIS AFFECTING PREGNANCY: Status: RESOLVED | Noted: 2018-08-24 | Resolved: 2018-08-26

## 2018-08-26 PROBLEM — O36.8190 DECREASED FETAL MOVEMENT: Status: ACTIVE | Noted: 2018-08-26

## 2018-08-26 PROBLEM — O26.613 CHOLESTASIS DURING PREGNANCY IN THIRD TRIMESTER: Status: ACTIVE | Noted: 2018-08-26

## 2018-08-26 PROBLEM — K80.20 CHOLECYSTOLITHIASIS AFFECTING PREGNANCY: Status: RESOLVED | Noted: 2018-08-24 | Resolved: 2018-08-26

## 2018-08-26 PROBLEM — K83.1 CHOLESTASIS DURING PREGNANCY IN THIRD TRIMESTER: Status: ACTIVE | Noted: 2018-08-26

## 2018-08-26 LAB
APPEARANCE UR: NORMAL
BILIRUB UR QL: NEGATIVE
COLOR UR: YELLOW
GLUCOSE UR QL STRIP.AUTO: NEGATIVE MG/DL
KETONES UR-MCNC: NEGATIVE MG/DL
LEUKOCYTE ESTERASE UR QL STRIP: NEGATIVE
NITRITE UR QL: NEGATIVE
PH UR: 6 [PH] (ref 5–9)
PROT UR QL: NEGATIVE MG/DL
RBC # UR STRIP: NEGATIVE /UL
SERVICE CMNT-IMP: NORMAL
SP GR UR: 1.01 (ref 1–1.02)
UROBILINOGEN UR QL: 0.2 EU/DL (ref 0.2–1)

## 2018-08-26 PROCEDURE — 59025 FETAL NON-STRESS TEST: CPT

## 2018-08-26 PROCEDURE — 99283 EMERGENCY DEPT VISIT LOW MDM: CPT

## 2018-08-26 PROCEDURE — 81003 URINALYSIS AUTO W/O SCOPE: CPT

## 2018-08-26 NOTE — PROGRESS NOTES
Labs reviewed. Recommend patient come in to office 8/27/18 to have repeat serum bile acids, CMP, PT and PTT drawn. Start NST's twice weekly with weekly SARAY, growth ultrasound now, refer to M for consult. Orders placed.

## 2018-08-26 NOTE — DISCHARGE INSTRUCTIONS
Always call your OB office prior to coming to the hospital.  Someone is ALWAYS on call. They will instruct you as to what to do next. Counting Your Baby's Kicks: Care Instructions  Your Care Instructions    Counting your baby's kicks is one way your doctor can tell that your baby is healthy. Most women-especially in a first pregnancy-feel their baby move for the first time between 16 and 22 weeks. The movement may feel like flutters rather than kicks. Your baby may move more at certain times of the day. When you are active, you may notice less kicking than when you are resting. At your prenatal visits, your doctor will ask whether the baby is active. In your last trimester, your doctor may ask you to count the number of times you feel your baby move. Follow-up care is a key part of your treatment and safety. Be sure to make and go to all appointments, and call your doctor if you are having problems. It's also a good idea to know your test results and keep a list of the medicines you take. How do you count fetal kicks? · A common method of checking your baby's movement is to count the number of kicks or moves you feel in 1 hour. Ten movements (such as kicks, flutters, or rolls) in 1 hour are normal. Some doctors suggest that you count in the morning until you get to 10 movements. Then you can quit for that day and start again the next day. · Pick your baby's most active time of day to count. This may be any time from morning to evening. · If you do not feel 10 movements in an hour, your baby may be sleeping. Wait for the next hour and count again. When should you call for help? Call your doctor now or seek immediate medical care if:    · You noticed that your baby has stopped moving or is moving much less than normal.    Watch closely for changes in your health, and be sure to contact your doctor if you have any problems. Where can you learn more?   Go to http://pat-glen.info/. Enter C158 in the search box to learn more about \"Counting Your Baby's Kicks: Care Instructions. \"  Current as of: 2017  Content Version: 11.7  © 0908-1689 [employer]. Care instructions adapted under license by [employer] (which disclaims liability or warranty for this information). If you have questions about a medical condition or this instruction, always ask your healthcare professional. [address] any warranty or liability for your use of this information. Weeks 26 to 30 of Your Pregnancy: Care Instructions  Your Care Instructions    You are now in your last trimester of pregnancy. Your baby is growing rapidly. And you'll probably feel your baby moving around more often. Your doctor may ask you to count your baby's kicks. Your back may ache as your body gets used to your baby's size and length. If you haven't already had the Tdap shot during this pregnancy, talk to your doctor about getting it. It will help protect your  against pertussis infection. During this time, it's important to take care of yourself and pay attention to what your body needs. If you feel sexual, explore ways to be close with your partner that match your comfort and desire. Use the tips provided in this care sheet to find ways to be sexual in your own way. Follow-up care is a key part of your treatment and safety. Be sure to make and go to all appointments, and call your doctor if you are having problems. It's also a good idea to know your test results and keep a list of the medicines you take. How can you care for yourself at home? Take it easy at work  · Take frequent breaks. If possible, stop working when you are tired, and rest during your lunch hour. · Take bathroom breaks every 2 hours. · Change positions often. If you sit for long periods, stand up and walk around.   · When you stand for a long time, keep one foot on a low stool with your knee bent. After standing a lot, sit with your feet up. · Avoid fumes, chemicals, and tobacco smoke. Be sexual in your own way  · Having sex during pregnancy is okay, unless your doctor tells you not to. · You may be very interested in sex, or you may have no interest at all. · Your growing belly can make it hard to find a good position during intercourse. [place] and explore. · You may get cramps in your uterus when your partner touches your breasts. · A back rub may relieve the backache or cramps that sometimes follow orgasm. Learn about  labor  · Watch for signs of  labor. You may be going into labor if:  ¨ You have menstrual-like cramps, with or without nausea. ¨ You have about 6 or more contractions in 1 hour, even after you have had a glass of water and are resting. ¨ You have a low, dull backache that does not go away when you change your position. ¨ You have pain or pressure in your pelvis that comes and goes in a pattern. ¨ You have intestinal cramping or flu-like symptoms, with or without diarrhea. ¨ You notice an increase or change in your vaginal discharge. Discharge may be heavy, mucus-like, watery, or streaked with blood. ¨ Your water breaks. · If you think you have  labor:  ¨ Drink 2 or 3 glasses of water or juice. Not drinking enough fluids can cause contractions. ¨ Stop what you are doing, and empty your bladder. Then lie down on your left side for at least 1 hour. ¨ While lying on your side, find your breast bone. Put your fingers in the soft spot just below it. Move your fingers down toward your belly button to find the top of your uterus. Check to see if it is tight. ¨ Contractions can be weak or strong. Record your contractions for an hour. Time a contraction from the start of one contraction to the start of the next one. ¨ Single or several strong contractions without a pattern are called Randell-Holly contractions. They are practice contractions but not the start of labor. They often stop if you change what you are doing. ¨ Call your doctor if you have regular contractions. Where can you learn more? Go to http://pat-glen.info/. Enter Q316 in the search box to learn more about \"Weeks 26 to 30 of Your Pregnancy: Care Instructions. \"  Current as of: November 21, 2017  Content Version: 11.7  © 0045-0262 [employer]. Care instructions adapted under license by [employer] (which disclaims liability or warranty for this information). If you have questions about a medical condition or this instruction, always ask your healthcare professional. [address] any warranty or liability for your use of this information.

## 2018-08-26 NOTE — PROGRESS NOTES
[address]. Serene Clinton called unit. Bile acid results discussed with ANGELITO, ANGELITO informed patient came to unit for assessment for decreased fetal movement but now states she is feeling adequate FM since being placed on monitors. ANGELITO states patient may be discharged home when reactive.

## 2018-08-26 NOTE — IP AVS SNAPSHOT
[address] [address] 
[phone] Patient: Roshan Queen MRN: [id number] Mckenzie Gottlieb About your hospitalization You were admitted on:  N/A You last received care in the:  THE [facility] 2 EAST L&D TRIAGE You were discharged on:  August 26, 2018 Why you were hospitalized Your primary diagnosis was:  Not on File Your diagnoses also included:  Decreased Fetal Movement Follow-up Information Follow up With Details Comments Contact Info None   None (395) Patient stated that they have no PCP Your Scheduled Appointments Tuesday September 04, 2018  2:00 PM EDT  
OB VISIT with Clay Ha NP  
[facility] ([address] [place] [address]) [address] [place] [id number] [phone] Discharge Orders None A check manuela indicates which time of day the medication should be taken. My Medications ASK your doctor about these medications Instructions Each Dose to Equal  
 Morning Noon Evening Bedtime  
 amoxicillin 500 mg capsule Commonly known as:  AMOXIL Your last dose was: Your next dose is: Take 500 mg by mouth. 500 mg PNV Comb #2-Iron-FA-Omega 3 29-1-400 mg Cmpk Your last dose was: Your next dose is: Take  by mouth. ursodiol 300 mg capsule Commonly known as:  ACTIGALL Your last dose was: Your next dose is: Take 300 mg by mouth three (3) times daily. 300 mg Discharge Instructions Always call your OB office prior to coming to the hospital.  Someone is ALWAYS on call. They will instruct you as to what to do next. Counting Your Baby's Kicks: Care Instructions Your Care Instructions Counting your baby's kicks is one way your doctor can tell that your baby is healthy. Most women-especially in a first pregnancy-feel their baby move for the first time between 16 and 22 weeks. The movement may feel like flutters rather than kicks. Your baby may move more at certain times of the day. When you are active, you may notice less kicking than when you are resting. At your prenatal visits, your doctor will ask whether the baby is active. In your last trimester, your doctor may ask you to count the number of times you feel your baby move. Follow-up care is a key part of your treatment and safety. Be sure to make and go to all appointments, and call your doctor if you are having problems. It's also a good idea to know your test results and keep a list of the medicines you take. How do you count fetal kicks? · A common method of checking your baby's movement is to count the number of kicks or moves you feel in 1 hour. Ten movements (such as kicks, flutters, or rolls) in 1 hour are normal. Some doctors suggest that you count in the morning until you get to 10 movements. Then you can quit for that day and start again the next day. · Pick your baby's most active time of day to count. This may be any time from morning to evening. · If you do not feel 10 movements in an hour, your baby may be sleeping. Wait for the next hour and count again. When should you call for help? Call your doctor now or seek immediate medical care if: 
  · You noticed that your baby has stopped moving or is moving much less than normal.  
 Watch closely for changes in your health, and be sure to contact your doctor if you have any problems. Where can you learn more? Go to http://pat-glen.info/. Enter P901 in the search box to learn more about \"Counting Your Baby's Kicks: Care Instructions. \" Current as of: November 21, 2017 Content Version: 11.7 © 2362-0086 [employer], [employer]orporated.  Care instructions adapted under license by [address] (which disclaims liability or warranty for this information). If you have questions about a medical condition or this instruction, always ask your healthcare professional. [address] any warranty or liability for your use of this information. Weeks 26 to 30 of Your Pregnancy: Care Instructions Your Care Instructions You are now in your last trimester of pregnancy. Your baby is growing rapidly. And you'll probably feel your baby moving around more often. Your doctor may ask you to count your baby's kicks. Your back may ache as your body gets used to your baby's size and length. If you haven't already had the Tdap shot during this pregnancy, talk to your doctor about getting it. It will help protect your  against pertussis infection. During this time, it's important to take care of yourself and pay attention to what your body needs. If you feel sexual, explore ways to be close with your partner that match your comfort and desire. Use the tips provided in this care sheet to find ways to be sexual in your own way. Follow-up care is a key part of your treatment and safety. Be sure to make and go to all appointments, and call your doctor if you are having problems. It's also a good idea to know your test results and keep a list of the medicines you take. How can you care for yourself at home? Take it easy at work · Take frequent breaks. If possible, stop working when you are tired, and rest during your lunch hour. · Take bathroom breaks every 2 hours. · Change positions often. If you sit for long periods, stand up and walk around. · When you stand for a long time, keep one foot on a low stool with your knee bent. After standing a lot, sit with your feet up. · Avoid fumes, chemicals, and tobacco smoke. Be sexual in your own way · Having sex during pregnancy is okay, unless your doctor tells you not to. · You may be very interested in sex, or you may have no interest at all. · Your growing belly can make it hard to find a good position during intercourse. [place] and explore. · You may get cramps in your uterus when your partner touches your breasts. · A back rub may relieve the backache or cramps that sometimes follow orgasm. Learn about  labor · Watch for signs of  labor. You may be going into labor if: 
¨ You have menstrual-like cramps, with or without nausea. ¨ You have about 6 or more contractions in 1 hour, even after you have had a glass of water and are resting. ¨ You have a low, dull backache that does not go away when you change your position. ¨ You have pain or pressure in your pelvis that comes and goes in a pattern. ¨ You have intestinal cramping or flu-like symptoms, with or without diarrhea. ¨ You notice an increase or change in your vaginal discharge. Discharge may be heavy, mucus-like, watery, or streaked with blood. ¨ Your water breaks. · If you think you have  labor: ¨ Drink 2 or 3 glasses of water or juice. Not drinking enough fluids can cause contractions. ¨ Stop what you are doing, and empty your bladder. Then lie down on your left side for at least 1 hour. ¨ While lying on your side, find your breast bone. Put your fingers in the soft spot just below it. Move your fingers down toward your belly button to find the top of your uterus. Check to see if it is tight. ¨ Contractions can be weak or strong. Record your contractions for an hour. Time a contraction from the start of one contraction to the start of the next one. ¨ Single or several strong contractions without a pattern are called [place]-Holly contractions. They are practice contractions but not the start of labor. They often stop if you change what you are doing. ¨ Call your doctor if you have regular contractions. Where can you learn more? Go to http://pat-glen.info/. Enter F774 in the search box to learn more about \"Weeks 26 to 30 of Your Pregnancy: Care Instructions. \" Current as of: November 21, 2017 Content Version: 11.7 © 9567-4588 Rip van Waf[employer], Incorporated. Care instructions adapted under license by [employer] (which disclaims liability or warranty for this information). If you have questions about a medical condition or this instruction, always ask your healthcare professional. [place] [address] any warranty or liability for your use of this information. Introducing [facility]! [employer] introduces iTwixie patient portal. Now you can access parts of your medical record, email your doctor's office, and request medication refills online. 1. In your internet browser, go to [email or web address] 2. Click on the First Time User? Click Here link in the Sign In box. You will see the New Member Sign Up page. 3. Enter your iTwixie Access Code exactly as it appears below. You will not need to use this code after youve completed the sign-up process. If you do not sign up before the expiration date, you must request a new code. · iTwixie Access Code: [email or web address] Expires: 10/1/2018  2:39 PM 
 
4. Enter the last four digits of your Social Security Number (xxxx) and Date of Birth (mm/dd/yyyy) as indicated and click Submit. You will be taken to the next sign-up page. 5. Create a iTwixie ID. This will be your iTwixie login ID and cannot be changed, so think of one that is secure and easy to remember. 6. Create a iTwixie password. You can change your password at any time. 7. Enter your Password Reset Question and Answer. This can be used at a later time if you forget your password. 8. Enter your e-mail address. You will receive e-mail notification when new information is available in [address] 9. Click Sign Up. You can now view and download portions of your medical record. 10. Click the Download Summary menu link to download a portable copy of your medical information. If you have questions, please visit the Frequently Asked Questions section of the Taxifyhart website. Remember, Everlater is NOT to be used for urgent needs. For medical emergencies, dial 911. Now available from your iPhone and Android! Introducing [facility] As a [employer] patient, I wanted to make you aware of our electronic visit tool called Los [facility]. [employer] 24/7 allows you to connect within minutes with a medical provider 24 hours a day, seven days a week via a mobile device or tablet or logging into a secure website from your computer. You can access Los Neal from anywhere in the [place]. A virtual visit might be right for you when you have a simple condition and feel like you just dont want to get out of bed, or cant get away from work for an appointment, when your regular [employer] provider is not available (evenings, weekends or holidays), or when youre out of town and need minor care. Electronic visits cost only $49 and if the [employer] 24/7 provider determines a prescription is needed to treat your condition, one can be electronically transmitted to a nearby pharmacy*. Please take a moment to enroll today if you have not already done so. The enrollment process is free and takes just a few minutes. To enroll, please download the [employer] 24/7 sherman to your tablet or phone, or visit [email or web address] to enroll on your computer. And, as an [address] [place] [address] patient with a [employer] account, the results of your visits will be scanned into your electronic medical record and your primary care provider will be able to view the scanned results. We urge you to continue to see your regular [employer] provider for your ongoing medical care.   And while your primary care provider may not be the one available when you seek a [facility] virtual visit, the peace of mind you get from getting a real diagnosis real time can be priceless. For more information on Los Neal, view our Frequently Asked Questions (FAQs) at [email or web address]. Sincerely, 
 
Ashok Be MD 
Chief Medical Officer Lilibeth Richmond *:  certain medications cannot be prescribed via Los Neal Providers Seen During Your Hospitalization Provider Specialty Primary office phone Arthur Mcknight MD Obstetrics & Gynecology [phone] Your Primary Care Physician (PCP) Primary Care Physician Office Phone Office Fax NONE ** None ** ** None ** You are allergic to the following No active allergies Recent Documentation OB Status Smoking Status Pregnant Never Smoker Emergency Contacts Name Discharge Info Relation Home Work Mobile PratherLauren DISCHARGE CAREGIVER [3] Mother [14] [phone] Patient Belongings The following personal items are in your possession at time of discharge: 
                             
 
  
  
 Please provide this summary of care documentation to your next provider. Signatures-by signing, you are acknowledging that this After Visit Summary has been reviewed with you and you have received a copy. Patient Signature:  ____________________________________________________________ Date:  ____________________________________________________________  
  
Almaz oDnald Provider Signature:  ____________________________________________________________ Date:  ____________________________________________________________

## 2018-08-26 NOTE — IP AVS SNAPSHOT
Summary of Care Report The Summary of Care report has been created to help improve care coordination. Users with access to [employer] or [address] (Web-based application) may access additional patient information including the Discharge Summary. If you are not currently a [address] user and need more information, please call the number listed below in the [place] section and ask to be connected with Medical Records. Facility Information Name Address Phone [address] [address] [phone] Patient Information Patient Name Sex DANIEL Ochoa ([id number]) Female 1995 Discharge Information Admitting Provider Service Area Unit Derick Solis MD / [phone]  [address] Triage / [phone] Discharge Provider Discharge Date/Time Discharge Disposition Destination (none) 2018 (Pending) AHR (none) Patient Language Language ENGLISH [13] Hospital Problems as of 2018  Reviewed: 2018  1:24 PM by Demian Norris MD  
  
  
  
 Class Noted - Resolved Last Modified POA Active Problems Decreased fetal movement  2018 - Present 2018 by Derick Solis MD Unknown Entered by Derick Solis MD  
  
Non-Hospital Problems as of 2018  Reviewed: 2018  1:24 PM by Demian Norris MD  
  
  
  
 Class Noted - Resolved Last Modified Active Problems Obesity affecting pregnancy in second trimester  7/3/2018 - Present 7/10/2018 by Ginny Reza NP Entered by Ginny Reza NP Overview Addendum 7/10/2018  4:43 PM by Ginny Reza NP Early 1 hr GTT - 97 NST's biweekly at 37 weeks Supervision of high risk pregnancy in third trimester  7/3/2018 - Present 2018 by Ginny Reza NP   Entered by Ginny Reza NP  
 Late prenatal care  7/3/2018 - Present 7/9/2018 by Narendra Escudero MD  
  Entered by Kristen Light NP Overview Signed 7/9/2018  2:01 PM by Narendra Escudero MD  
   7/6/18  Female fetus. EFW 89%., LILLIANA 11/12/18. Rubella non-immune status, antepartum  7/7/2018 - Present 7/7/2018 by Kristen Light NP Entered by Kristen Lihgt NP Maternal varicella, non-immune  7/7/2018 - Present 7/7/2018 by Kristen Light NP Entered by Kristen Light NP Cholestasis during pregnancy in third trimester  8/26/2018 - Present 8/26/2018 by Narendra Escudero MD  
  Entered by Narendra Escudero MD  
  
You are allergic to the following No active allergies Current Discharge Medication List  
  
ASK your doctor about these medications Dose & Instructions Dispensing Information Comments  
 amoxicillin 500 mg capsule Commonly known as:  AMOXIL Dose:  500 mg Take 500 mg by mouth. Refills:  0  
   
 PNV Comb #2-Iron-FA-Omega 3 29-1-400 mg Cmpk Take  by mouth. Refills:  0  
   
 ursodiol 300 mg capsule Commonly known as:  ACTIGALL Dose:  300 mg Take 300 mg by mouth three (3) times daily. Refills:  0 Current Immunizations Name Date Tdap 8/21/2018 Follow-up Information Follow up With Details Comments Contact Info None   None (395) Patient stated that they have no PCP Discharge Instructions Always call your OB office prior to coming to the hospital.  Someone is ALWAYS on call. They will instruct you as to what to do next. Counting Your Baby's Kicks: Care Instructions Your Care Instructions Counting your baby's kicks is one way your doctor can tell that your baby is healthy. Most women-especially in a first pregnancy-feel their baby move for the first time between 16 and 22 weeks. The movement may feel like flutters rather than kicks.  Your baby may move more at certain times of the day. When you are active, you may notice less kicking than when you are resting. At your prenatal visits, your doctor will ask whether the baby is active. In your last trimester, your doctor may ask you to count the number of times you feel your baby move. Follow-up care is a key part of your treatment and safety. Be sure to make and go to all appointments, and call your doctor if you are having problems. It's also a good idea to know your test results and keep a list of the medicines you take. How do you count fetal kicks? · A common method of checking your baby's movement is to count the number of kicks or moves you feel in 1 hour. Ten movements (such as kicks, flutters, or rolls) in 1 hour are normal. Some doctors suggest that you count in the morning until you get to 10 movements. Then you can quit for that day and start again the next day. · Pick your baby's most active time of day to count. This may be any time from morning to evening. · If you do not feel 10 movements in an hour, your baby may be sleeping. Wait for the next hour and count again. When should you call for help? Call your doctor now or seek immediate medical care if: 
  · You noticed that your baby has stopped moving or is moving much less than normal.  
 Watch closely for changes in your health, and be sure to contact your doctor if you have any problems. Where can you learn more? Go to http://pat-glen.info/. Enter F080 in the search box to learn more about \"Counting Your Baby's Kicks: Care Instructions. \" Current as of: November 21, 2017 Content Version: 11.7 © 3757-5073 [employer]. Care instructions adapted under license by [employer] (which disclaims liability or warranty for this information).  If you have questions about a medical condition or this instruction, always ask your healthcare professional. Carla Tapia, Incorporated disclaims any warranty or liability for your use of this information. Weeks 26 to 30 of Your Pregnancy: Care Instructions Your Care Instructions You are now in your last trimester of pregnancy. Your baby is growing rapidly. And you'll probably feel your baby moving around more often. Your doctor may ask you to count your baby's kicks. Your back may ache as your body gets used to your baby's size and length. If you haven't already had the Tdap shot during this pregnancy, talk to your doctor about getting it. It will help protect your  against pertussis infection. During this time, it's important to take care of yourself and pay attention to what your body needs. If you feel sexual, explore ways to be close with your partner that match your comfort and desire. Use the tips provided in this care sheet to find ways to be sexual in your own way. Follow-up care is a key part of your treatment and safety. Be sure to make and go to all appointments, and call your doctor if you are having problems. It's also a good idea to know your test results and keep a list of the medicines you take. How can you care for yourself at home? Take it easy at work · Take frequent breaks. If possible, stop working when you are tired, and rest during your lunch hour. · Take bathroom breaks every 2 hours. · Change positions often. If you sit for long periods, stand up and walk around. · When you stand for a long time, keep one foot on a low stool with your knee bent. After standing a lot, sit with your feet up. · Avoid fumes, chemicals, and tobacco smoke. Be sexual in your own way · Having sex during pregnancy is okay, unless your doctor tells you not to. · You may be very interested in sex, or you may have no interest at all. · Your growing belly can make it hard to find a good position during intercourse. [place] and explore.  
· You may get cramps in your uterus when your partner touches your breasts. · A back rub may relieve the backache or cramps that sometimes follow orgasm. Learn about  labor · Watch for signs of  labor. You may be going into labor if: 
¨ You have menstrual-like cramps, with or without nausea. ¨ You have about 6 or more contractions in 1 hour, even after you have had a glass of water and are resting. ¨ You have a low, dull backache that does not go away when you change your position. ¨ You have pain or pressure in your pelvis that comes and goes in a pattern. ¨ You have intestinal cramping or flu-like symptoms, with or without diarrhea. ¨ You notice an increase or change in your vaginal discharge. Discharge may be heavy, mucus-like, watery, or streaked with blood. ¨ Your water breaks. · If you think you have  labor: ¨ Drink 2 or 3 glasses of water or juice. Not drinking enough fluids can cause contractions. ¨ Stop what you are doing, and empty your bladder. Then lie down on your left side for at least 1 hour. ¨ While lying on your side, find your breast bone. Put your fingers in the soft spot just below it. Move your fingers down toward your belly button to find the top of your uterus. Check to see if it is tight. ¨ Contractions can be weak or strong. Record your contractions for an hour. Time a contraction from the start of one contraction to the start of the next one. ¨ Single or several strong contractions without a pattern are called [employer]-Holly contractions. They are practice contractions but not the start of labor. They often stop if you change what you are doing. ¨ Call your doctor if you have regular contractions. Where can you learn more? Go to http://pat-glen.info/. Enter Z617 in the search box to learn more about \"Weeks 26 to 30 of Your Pregnancy: Care Instructions. \" Current as of: 2017 Content Version: 11.7 © 1616-5318 [employer].  Care instructions adapted under license by [address] (which disclaims liability or warranty for this information). If you have questions about a medical condition or this instruction, always ask your healthcare professional. [place] [address] any warranty or liability for your use of this information. Chart Review Routing History No Routing History on File

## 2018-08-26 NOTE — PROGRESS NOTES
1529 Patient presents to unit with complaints of no fetal movement, but states that after her arrival began feeling movement. [facility] applied. 1620 Discharge instructions explained to patient with verbalized understanding. Patient discharged and off unit accompanied by family.

## 2018-08-29 PROBLEM — O99.013 ANEMIA DURING PREGNANCY IN THIRD TRIMESTER: Status: ACTIVE | Noted: 2018-08-29

## 2018-09-03 ENCOUNTER — HOSPITAL ENCOUNTER (EMERGENCY)
Age: 23
Discharge: HOME | End: 2018-09-03
Attending: OBSTETRICS & GYNECOLOGY | Admitting: OBSTETRICS & GYNECOLOGY
Payer: MEDICAID

## 2018-09-03 VITALS
SYSTOLIC BLOOD PRESSURE: 95 MMHG | DIASTOLIC BLOOD PRESSURE: 47 MMHG | RESPIRATION RATE: 17 BRPM | WEIGHT: 208 LBS | HEIGHT: 65 IN | TEMPERATURE: 98.1 F | HEART RATE: 63 BPM | BODY MASS INDEX: 34.66 KG/M2

## 2018-09-03 PROCEDURE — 59025 FETAL NON-STRESS TEST: CPT

## 2018-09-03 NOTE — IP AVS SNAPSHOT
[address] [address] 
[phone] Patient: Bronwyn Batista MRN: [id number] Kristina Mcdonald About your hospitalization You were admitted on:  N/A You last received care in the:  [facility] 2 EAST L&D TRIAGE You were discharged on:  September 3, 2018 Why you were hospitalized Your primary diagnosis was:  Not on File Follow-up Information Follow up With Details Comments Contact Info None   None (395) Patient stated that they have no PCP Your Scheduled Appointments Tuesday September 04, 2018  2:00 PM EDT  
OB VISIT with Aisha Parikh NP  
[facility] ([address] [place] [address]) [address] [place] [id number] [phone] Thursday September 06, 2018 12:00 PM EDT  
NON-STRESS TEST with NURSE [facility] ([facility]) [address] [place] [phone] Monday September 10, 2018 12:00 PM EDT  
NON-STRESS TEST with NURSE [facility] ([facility]) [address] [place] [phone] Thursday September 13, 2018 12:00 PM EDT  
NON-STRESS TEST with NURSE [facility] ([facility]) [address] [place] [phone]  
[phone] Monday September 17, 2018  9:00 AM EDT  
NON-STRESS TEST with NURSE [facility] ([facility]) [address] [place] [phone]  
[phone] Thursday September 20, 2018  9:00 AM EDT  
NON-STRESS TEST with NURSE [facility] ([facility]) [address] [place] [phone] Discharge Orders None A check manuela indicates which time of day the medication should be taken. My Medications ASK your doctor about these medications Instructions Each Dose to Equal  
 Morning Noon Evening Bedtime  
 amoxicillin 500 mg capsule Commonly known as:  AMOXIL Your last dose was: Your next dose is: Take 500 mg by mouth. 500 mg PNV Comb #2-Iron-FA-Omega 3 29-1-400 mg Cmpk Your last dose was: Your next dose is: Take  by mouth. ursodiol 300 mg capsule Commonly known as:  ACTIGALL Your last dose was: Your next dose is: Take 300 mg by mouth three (3) times daily. 300 mg Discharge Instructions DISCHARGE SUMMARY from Nurse PATIENT INSTRUCTIONS: 
 
After general anesthesia or intravenous sedation, for 24 hours or while taking prescription Narcotics: · Limit your activities · Do not drive and operate hazardous machinery · Do not make important personal or business decisions · Do  not drink alcoholic beverages · If you have not urinated within 8 hours after discharge, please contact your surgeon on call. Report the following to your surgeon: 
· Excessive pain, swelling, redness or odor of or around the surgical area · Temperature over 100.5 · Nausea and vomiting lasting longer than 4 hours or if unable to take medications · Any signs of decreased circulation or nerve impairment to extremity: change in color, persistent  numbness, tingling, coldness or increase pain · Any questions What to do at Home: 
Recommended activity: Activity as tolerated, If you experience any of the following symptoms contractions 5 minutes apart, leaking of blood or fluid from your vagina, please follow up with labor & delivery and call your OB provider. *  Please give a list of your current medications to your Primary Care Provider.  
 
*  Please update this list whenever your medications are discontinued, doses are 
 changed, or new medications (including over-the-counter products) are added. *  Please carry medication information at all times in case of emergency situations. These are general instructions for a healthy lifestyle: No smoking/ No tobacco products/ Avoid exposure to second hand smoke Surgeon General's Warning:  Quitting smoking now greatly reduces serious risk to your health. Obesity, smoking, and sedentary lifestyle greatly increases your risk for illness A healthy diet, regular physical exercise & weight monitoring are important for maintaining a healthy lifestyle You may be retaining fluid if you have a history of heart failure or if you experience any of the following symptoms:  Weight gain of 3 pounds or more overnight or 5 pounds in a week, increased swelling in our hands or feet or shortness of breath while lying flat in bed. Please call your doctor as soon as you notice any of these symptoms; do not wait until your next office visit. Recognize signs and symptoms of STROKE: 
 
F-face looks uneven A-arms unable to move or move unevenly S-speech slurred or non-existent T-time-call 911 as soon as signs and symptoms begin-DO NOT go Back to bed or wait to see if you get better-TIME IS BRAIN. Warning Signs of HEART ATTACK Call 911 if you have these symptoms: 
? Chest discomfort. Most heart attacks involve discomfort in the center of the chest that lasts more than a few minutes, or that goes away and comes back. It can feel like uncomfortable pressure, squeezing, fullness, or pain. ? Discomfort in other areas of the upper body. Symptoms can include pain or discomfort in one or both arms, the back, neck, jaw, or stomach. ? Shortness of breath with or without chest discomfort. ? Other signs may include breaking out in a cold sweat, nausea, or lightheadedness. Don't wait more than five minutes to call [address]!  Fast action can save your life. Calling 911 is almost always the fastest way to get lifesaving treatment. Emergency Medical Services staff can begin treatment when they arrive  up to an hour sooner than if someone gets to the hospital by car. The discharge information has been reviewed with the patient. The patient verbalized understanding. Discharge medications reviewed with the patient and appropriate educational materials and side effects teaching were provided. ___________________________________________________________________________________________________________________________________ Introducing [facility]! [facility] introduces Showcase patient portal. Now you can access parts of your medical record, email your doctor's office, and request medication refills online. 1. In your internet browser, go to [email or web address] 2. Click on the First Time User? Click Here link in the Sign In box. You will see the New Member Sign Up page. 3. Enter your Showcase Access Code exactly as it appears below. You will not need to use this code after youve completed the sign-up process. If you do not sign up before the expiration date, you must request a new code. · Showcase Access Code: [email or web address] Expires: 10/1/2018  2:39 PM 
 
4. Enter the last four digits of your Social Security Number (xxxx) and Date of Birth (mm/dd/yyyy) as indicated and click Submit. You will be taken to the next sign-up page. 5. Create a Ocisiont ID. This will be your Ocisiont login ID and cannot be changed, so think of one that is secure and easy to remember. 6. Create a Ocisiont password. You can change your password at any time. 7. Enter your Password Reset Question and Answer. This can be used at a later time if you forget your password. 8. Enter your e-mail address. You will receive e-mail notification when new information is available in [address] 9. Click Sign Up. You can now view and download portions of your medical record. 10. Click the Download Summary menu link to download a portable copy of your medical information. If you have questions, please visit the Frequently Asked Questions section of the Xamplifiedt website. Remember, [employer] is NOT to be used for urgent needs. For medical emergencies, dial 911. Now available from your iPhone and Android! Introducing [facility] As a [facility] patient, I wanted to make you aware of our electronic visit tool called [place]el. Seragatha Mcgovern 24/7 allows you to connect within minutes with a medical provider 24 hours a day, seven days a week via a mobile device or tablet or logging into a secure website from your computer. You can access [place] Neal from anywhere in the [place]. A virtual visit might be right for you when you have a simple condition and feel like you just dont want to get out of bed, or cant get away from work for an appointment, when your regular [facility] provider is not available (evenings, weekends or holidays), or when youre out of town and need minor care. Electronic visits cost only $49 and if the Seragatha Mcgovern [facility] provider determines a prescription is needed to treat your condition, one can be electronically transmitted to a nearby pharmacy*. Please take a moment to enroll today if you have not already done so. The enrollment process is free and takes just a few minutes. To enroll, please download the RMDMgroup/SheerID sherman to your tablet or phone, or visit [email or web address] to enroll on your computer. And, as an [address] [place] [address] patient with a [employer] account, the results of your visits will be scanned into your electronic medical record and your primary care provider will be able to view the scanned results.    
We urge you to continue to see your regular [facility] provider for your ongoing medical care. And while your primary care provider may not be the one available when you seek a [place] virtual visit, the peace of mind you get from getting a real diagnosis real time can be priceless. For more information on [place], view our Frequently Asked Questions (FAQs) at [email or web address]. Sincerely, 
 
Hollie Purcell MD 
Chief Medical Officer Lilibeth Richmond *:  certain medications cannot be prescribed via [employer]IS [employer]issett[employer] Providers Seen During Your Hospitalization Provider Specialty Primary office phone Gonzalez Rodriguez MD Obstetrics & Gynecology [phone] Your Primary Care Physician (PCP) Primary Care Physician Office Phone Office Fax NONE ** None ** ** None ** You are allergic to the following No active allergies Recent Documentation Height Weight BMI OB Status Smoking Status 1.651 m 94.3 kg 34.61 kg/m2 Pregnant Never Smoker Emergency Contacts Name Discharge Info Relation Home Work Mobile PratherLauren DISCHARGE CAREGIVER [3] Mother [14] [phone] Patient Belongings The following personal items are in your possession at time of discharge: 
                             
 
  
  
Discharge Instructions Attachments/References PREGNANCY: NONSTRESS TEST (ENGLISH) PREGNANCY: KICK COUNTS (ENGLISH) Patient Handouts Nonstress Test: About This Test 
What is it? A nonstress test is a test that checks your baby's heartbeat patterns. The test can show heart rate changes when the baby moves. It also shows changes when you have contractions, if you're having them. A fetal heart rate that speeds up when the baby moves is a sign that the baby is getting plenty of oxygen. This test is painless. It uses only monitor sensors, which are placed on your belly. Why is this test done?  
A nonstress test is often done when there is any question about how the baby is doing during later pregnancy. Some women with high-risk pregnancies are tested every week or twice a week in the third trimester. Sometimes a nonstress test is done together with a fetal ultrasound. This is called a biophysical profile (BPP). The BPP measures your baby's heart rate, muscle tone, movement, and breathing. It also measures the amount of amniotic fluid around the baby. How can you prepare for the test? 
· If you smoke, you will be asked to stop smoking for 2 hours before the test. This is because smoking affects your baby's heart rate and movements. What happens during the test? 
For the test, you will lie back on a padded exam table. · Two elastic belts with sensors are placed across your belly. One sensor tracks your baby's heart rate with reflected sound waves (Doppler ultrasound). The other sensor measures how long your contractions are, if you are having any. · You may hear your baby's heartbeat as a beeping sound. Or you may see it printed out on a chart. · You may be asked to push a button on the machine when your baby moves or you have a contraction. This helps your doctor look at how your baby's heart reacts to movement and contractions. · If there isn't much movement, it can be because the baby is asleep. If this happens during your test, the doctor or technician may try to wake the baby with a loud noise or by having you eat or drink something. How long does the test take? · The test takes about 20 to 40 minutes. It depends on how quickly your baby becomes active. What happens after the test? 
· Before you leave, your doctor will tell you how your baby seems to be doing. You will also find out if more testing is needed. · You will probably be able to go home right away. · You can go back to your usual activities right away. · If you smoke, you may have noticed that your baby moved more when you stopped smoking for 2 hours.  This is because the baby gets less oxygen when you smoke. If you need help quitting, talk to your doctor about stop-smoking programs and medicines. These can increase your chances of quitting for good. Follow-up care is a key part of your treatment and safety. Be sure to make and go to all appointments, and call your doctor if you are having problems. It's also a good idea to keep a list of the medicines you take. Ask your doctor when you can expect to have your test results. Where can you learn more? Go to http://pat-glen.info/. Enter W289 in the search box to learn more about \"Nonstress Test: About This Test.\" Current as of: November 21, 2017 Content Version: 11.7 © 2294-8007 [employer]. Care instructions adapted under license by [employer] (which disclaims liability or warranty for this information). If you have questions about a medical condition or this instruction, always ask your healthcare professional. [place] [address] any warranty or liability for your use of this information. Counting Your Baby's Kicks: Care Instructions Your Care Instructions Counting your baby's kicks is one way your doctor can tell that your baby is healthy. Most women-especially in a first pregnancy-feel their baby move for the first time between 16 and 22 weeks. The movement may feel like flutters rather than kicks. Your baby may move more at certain times of the day. When you are active, you may notice less kicking than when you are resting. At your prenatal visits, your doctor will ask whether the baby is active. In your last trimester, your doctor may ask you to count the number of times you feel your baby move. Follow-up care is a key part of your treatment and safety. Be sure to make and go to all appointments, and call your doctor if you are having problems. It's also a good idea to know your test results and keep a list of the medicines you take. How do you count fetal kicks? · A common method of checking your baby's movement is to count the number of kicks or moves you feel in 1 hour. Ten movements (such as kicks, flutters, or rolls) in 1 hour are normal. Some doctors suggest that you count in the morning until you get to 10 movements. Then you can quit for that day and start again the next day. · Pick your baby's most active time of day to count. This may be any time from morning to evening. · If you do not feel 10 movements in an hour, your baby may be sleeping. Wait for the next hour and count again. When should you call for help? Call your doctor now or seek immediate medical care if: 
  · You noticed that your baby has stopped moving or is moving much less than normal.  
 Watch closely for changes in your health, and be sure to contact your doctor if you have any problems. Where can you learn more? Go to http://pat-glen.info/. Enter I707 in the search box to learn more about \"Counting Your Baby's Kicks: Care Instructions. \" Current as of: November 21, 2017 Content Version: 11.7 © 7250-3754 [employer]. Care instructions adapted under license by [employer] (which disclaims liability or warranty for this information). If you have questions about a medical condition or this instruction, always ask your healthcare professional. [address] any warranty or liability for your use of this information. Please provide this summary of care documentation to your next provider. Signatures-by signing, you are acknowledging that this After Visit Summary has been reviewed with you and you have received a copy. Patient Signature:  ____________________________________________________________ Date:  ____________________________________________________________  
  
Antonio Acosta  Provider Signature: ____________________________________________________________ Date:  ____________________________________________________________

## 2018-09-03 NOTE — IP AVS SNAPSHOT
[address] [place] [address] [place] [address] 
[phone] Patient: Lopez Olvera MRN: [id number] Michael Gold A check manuela indicates which time of day the medication should be taken. My Medications ASK your doctor about these medications Instructions Each Dose to Equal  
 Morning Noon Evening Bedtime  
 amoxicillin 500 mg capsule Commonly known as:  AMOXIL Your last dose was: Your next dose is: Take 500 mg by mouth. 500 mg PNV Comb #2-Iron-FA-Omega 3 29-1-400 mg Cmpk Your last dose was: Your next dose is: Take  by mouth. ursodiol 300 mg capsule Commonly known as:  ACTIGALL Your last dose was: Your next dose is: Take 300 mg by mouth three (3) times daily.   
 300 mg

## 2018-09-03 NOTE — IP AVS SNAPSHOT
Summary of Care Report The Summary of Care report has been created to help improve care coordination. Users with access to [employer] or [employer] [facility] (Web-based application) may access additional patient information including the Discharge Summary. If you are not currently a [address] user and need more information, please call the number listed below in the [place] [address] section and ask to be connected with Medical Records. Facility Information Name Address Phone [address] [address] [phone] Patient Information Patient Name Sex  Ayala Montes De Ocaer ([id number]) Female 1995 Discharge Information Admitting Provider Service Area Unit Sosa Taveras MD / [address] [place] [address] [address] Triage / [phone] Discharge Provider Discharge Date/Time Discharge Disposition Destination (none) 9/3/2018 Evening (Pending) AHR (none) Patient Language Language ENGLISH [13] Hospital Problems as of 9/3/2018  Reviewed: 2018  1:46 PM by Martha Rodriguez NP None Non-Hospital Problems as of 9/3/2018  Reviewed: 2018  1:46 PM by Martha Rodriguez NP Class Noted - Resolved Last Modified Active Problems Obesity affecting pregnancy in second trimester  7/3/2018 - Present 2018 by Martha Rodriguez NP Entered by Martha Rodriguez NP Overview Addendum 2018 12:59 PM by Martha Rodriguez NP Early 1 hr GTT - 97; NL 1 hr GTT at 28 wks (124) NST's biweekly at 37 weeks Supervision of high risk pregnancy in third trimester  7/3/2018 - Present 2018 by Martha Rodriguez NP Entered by Martha Rodriguez NP   Late prenatal care  7/3/2018 - Present 2018 by Jordy Herrera MD  
  Entered by Martha Rodriguez NP  
 Overview Addendum 8/27/2018  5:06 PM by Noemi Angeles MD  
   7/6/18  Female fetus. EFW 89%., LILLIANA 11/12/18. 
8/27/18  EFW 86%. , 1571 grams. Rubella non-immune status, antepartum  7/7/2018 - Present 7/7/2018 by Susan Bahena NP Entered by Susan Bahena NP Maternal varicella, non-immune  7/7/2018 - Present 7/7/2018 by Susan Bahena NP Entered by Susan Bahena NP Cholestasis during pregnancy in third trimester  8/26/2018 - Present 8/26/2018 by Noemi Angeles MD  
  Entered by Noemi Angeles MD  
  Decreased fetal movement  8/26/2018 - Present 8/26/2018 by Maribel Wade MD  
  Entered by Maribel Wade MD  
  Anemia during pregnancy in third trimester  8/29/2018 - Present 8/29/2018 by Susan Bahena NP Entered by Susan Bahena NP Overview Signed 8/29/2018  4:57 PM by Susan Bahena NP  
   10.1 on 8/27 - OTC iron You are allergic to the following No active allergies Current Discharge Medication List  
  
ASK your doctor about these medications Dose & Instructions Dispensing Information Comments  
 amoxicillin 500 mg capsule Commonly known as:  AMOXIL Dose:  500 mg Take 500 mg by mouth. Refills:  0  
   
 PNV Comb #2-Iron-FA-Omega 3 29-1-400 mg Cmpk Take  by mouth. Refills:  0  
   
 ursodiol 300 mg capsule Commonly known as:  ACTIGALL Dose:  300 mg Take 300 mg by mouth three (3) times daily. Refills:  0 Current Immunizations Name Date Tdap 8/21/2018 Follow-up Information Follow up With Details Comments Contact Info None   None (395) Patient stated that they have no PCP Discharge Instructions DISCHARGE SUMMARY from Nurse PATIENT INSTRUCTIONS: 
 
 
F-face looks uneven A-arms unable to move or move unevenly S-speech slurred or non-existent T-time-call 911 as soon as signs and symptoms begin-DO NOT go Back to bed or wait to see if you get better-TIME IS BRAIN. Warning Signs of HEART ATTACK Call 911 if you have these symptoms: 
? Chest discomfort. Most heart attacks involve discomfort in the center of the chest that lasts more than a few minutes, or that goes away and comes back. It can feel like uncomfortable pressure, squeezing, fullness, or pain. ? Discomfort in other areas of the upper body. Symptoms can include pain or discomfort in one or both arms, the back, neck, jaw, or stomach. ? Shortness of breath with or without chest discomfort. ? Other signs may include breaking out in a cold sweat, nausea, or lightheadedness. Don't wait more than five minutes to call [address]! Fast action can save your life. Calling 911 is almost always the fastest way to get lifesaving treatment. Emergency Medical Services staff can begin treatment when they arrive  up to an hour sooner than if someone gets to the hospital by car. The discharge information has been reviewed with the patient. The patient verbalized understanding. Discharge medications reviewed with the patient and appropriate educational materials and side effects teaching were provided. ___________________________________________________________________________________________________________________________________ Chart Review Routing History No Routing History on File

## 2018-09-03 NOTE — PROGRESS NOTES
1740 G3PO patient @ 30 weeks gestation arrives to unit for scheduled NST. Patient oriented to Triage Bed 1. EFM/TOCO placed; abdomen palpates soft. Patient denies contractions/ nor fluid/blood leaking from vagina; +FM; denies headache, blurry vision, epigastric pain. 1805 Strip reactive; Maurice Montalvo CNM on call covering for Dr. Regina Flores called for discharge order. Order obtained. Educated patient regarding fetal kick counts, signs and symptoms of active labor, symptoms to report to MD, when to come back to hospital, and instructed on when to report symptoms to MD.      1815 Patient ambulates off unit with mother. No further concerns expressed at this time.

## 2018-09-10 ENCOUNTER — HOSPITAL ENCOUNTER (EMERGENCY)
Age: 23
Discharge: HOME | End: 2018-09-10
Attending: OBSTETRICS & GYNECOLOGY | Admitting: OBSTETRICS & GYNECOLOGY
Payer: MEDICAID

## 2018-09-10 VITALS — BODY MASS INDEX: 34.49 KG/M2 | HEIGHT: 65 IN | TEMPERATURE: 98.8 F | WEIGHT: 207 LBS

## 2018-09-10 PROBLEM — Z34.90 NORMAL IUP (INTRAUTERINE PREGNANCY) ON PRENATAL ULTRASOUND: Status: ACTIVE | Noted: 2018-09-10

## 2018-09-10 LAB
ALBUMIN SERPL-MCNC: 2.5 G/DL (ref 3.4–5)
ALBUMIN/GLOB SERPL: 0.6 {RATIO} (ref 0.8–1.7)
ALP SERPL-CCNC: 95 U/L (ref 45–117)
ALT SERPL-CCNC: 33 U/L (ref 13–56)
ANION GAP SERPL CALC-SCNC: 11 MMOL/L (ref 3–18)
APPEARANCE UR: CLEAR
AST SERPL-CCNC: 18 U/L (ref 15–37)
BASOPHILS # BLD: 0 K/UL (ref 0–0.1)
BASOPHILS NFR BLD: 0 % (ref 0–2)
BILIRUB SERPL-MCNC: 0.3 MG/DL (ref 0.2–1)
BILIRUB UR QL: NEGATIVE
BUN SERPL-MCNC: 4 MG/DL (ref 7–18)
BUN/CREAT SERPL: 6 (ref 12–20)
CALCIUM SERPL-MCNC: 9 MG/DL (ref 8.5–10.1)
CHLORIDE SERPL-SCNC: 102 MMOL/L (ref 100–108)
CO2 SERPL-SCNC: 23 MMOL/L (ref 21–32)
COLOR UR: YELLOW
CREAT SERPL-MCNC: 0.66 MG/DL (ref 0.6–1.3)
DIFFERENTIAL METHOD BLD: ABNORMAL
EOSINOPHIL # BLD: 0.1 K/UL (ref 0–0.4)
EOSINOPHIL NFR BLD: 1 % (ref 0–5)
ERYTHROCYTE [DISTWIDTH] IN BLOOD BY AUTOMATED COUNT: 12.8 % (ref 11.6–14.5)
FLUAV AG NPH QL IA: NEGATIVE
FLUBV AG NOSE QL IA: NEGATIVE
GLOBULIN SER CALC-MCNC: 4.4 G/DL (ref 2–4)
GLUCOSE SERPL-MCNC: 66 MG/DL (ref 74–99)
GLUCOSE UR QL STRIP.AUTO: NEGATIVE MG/DL
HCT VFR BLD AUTO: 31.8 % (ref 35–45)
HGB BLD-MCNC: 10.6 G/DL (ref 12–16)
KETONES UR-MCNC: 40 MG/DL
LEUKOCYTE ESTERASE UR QL STRIP: NEGATIVE
LYMPHOCYTES # BLD: 0.8 K/UL (ref 0.9–3.6)
LYMPHOCYTES NFR BLD: 7 % (ref 21–52)
MCH RBC QN AUTO: 29.2 PG (ref 24–34)
MCHC RBC AUTO-ENTMCNC: 33.3 G/DL (ref 31–37)
MCV RBC AUTO: 87.6 FL (ref 74–97)
MONOCYTES # BLD: 1 K/UL (ref 0.05–1.2)
MONOCYTES NFR BLD: 9 % (ref 3–10)
NEUTS SEG # BLD: 10 K/UL (ref 1.8–8)
NEUTS SEG NFR BLD: 83 % (ref 40–73)
NITRITE UR QL: NEGATIVE
PH UR: 7 [PH] (ref 5–9)
PLATELET # BLD AUTO: 303 K/UL (ref 135–420)
PMV BLD AUTO: 10.4 FL (ref 9.2–11.8)
POTASSIUM SERPL-SCNC: 3.4 MMOL/L (ref 3.5–5.5)
PROT SERPL-MCNC: 6.9 G/DL (ref 6.4–8.2)
PROT UR QL: NEGATIVE MG/DL
RBC # BLD AUTO: 3.63 M/UL (ref 4.2–5.3)
RBC # UR STRIP: NEGATIVE /UL
SERVICE CMNT-IMP: ABNORMAL
SODIUM SERPL-SCNC: 136 MMOL/L (ref 136–145)
SP GR UR: 1.02 (ref 1–1.02)
UROBILINOGEN UR QL: 0.2 EU/DL (ref 0.2–1)
WBC # BLD AUTO: 12 K/UL (ref 4.6–13.2)

## 2018-09-10 PROCEDURE — 99283 EMERGENCY DEPT VISIT LOW MDM: CPT

## 2018-09-10 PROCEDURE — 74011250636 HC RX REV CODE- 250/636: Performed by: OBSTETRICS & GYNECOLOGY

## 2018-09-10 PROCEDURE — 96360 HYDRATION IV INFUSION INIT: CPT

## 2018-09-10 PROCEDURE — 87804 INFLUENZA ASSAY W/OPTIC: CPT | Performed by: OBSTETRICS & GYNECOLOGY

## 2018-09-10 PROCEDURE — 81003 URINALYSIS AUTO W/O SCOPE: CPT

## 2018-09-10 PROCEDURE — 82239 BILE ACIDS TOTAL: CPT | Performed by: OBSTETRICS & GYNECOLOGY

## 2018-09-10 PROCEDURE — 59025 FETAL NON-STRESS TEST: CPT

## 2018-09-10 PROCEDURE — 85025 COMPLETE CBC W/AUTO DIFF WBC: CPT | Performed by: OBSTETRICS & GYNECOLOGY

## 2018-09-10 PROCEDURE — 80053 COMPREHEN METABOLIC PANEL: CPT | Performed by: OBSTETRICS & GYNECOLOGY

## 2018-09-10 RX ADMIN — SODIUM CHLORIDE, SODIUM LACTATE, POTASSIUM CHLORIDE, AND CALCIUM CHLORIDE 1000 ML: 600; 310; 30; 20 INJECTION, SOLUTION INTRAVENOUS at 11:20

## 2018-09-10 NOTE — PROGRESS NOTES
1020-Pt arrived to triage ambulatory with complaints of fu like symptoms starting yesterday afternoon including fever, chill, fatigue, muscle aches/soreness. Pt also states she feels decreased fetal movement. No other complaints at this time. Pt denies contractions, leakage of fluid, bleeding, or SOB. On assessment FHR elevated with baseline of 165 and long accelerations in the 180s. Pt's BP low with average of 100s/40s. No fever noted on assessment. 1038-Dr. Galo made aware of Patient's arrival and assessments. Please see order history for order details. 1415-All ordered labs resulted. Dr. Dale Bernstein called  FHR tracing reviewed with MD lab results and assessments reviewed. Rapid flu negative. Order received from MD to D/C patient home with appropriate teaching for OTC medications for flu like symptoms. Kick counts for assessing fetal activity and to [facility] scheduled OB appointments. Discharge instructions and educations reviewed with patient. Opportunity given for questions. Pt states understanding. Will D/C home now.

## 2018-09-10 NOTE — IP AVS SNAPSHOT
Summary of Care Report The Summary of Care report has been created to help improve care coordination. Users with access to [employer] or [address] (Web-based application) may access additional patient information including the Discharge Summary. If you are not currently a [address] user and need more information, please call the number listed below in the [place] [address] section and ask to be connected with Medical Records. Facility Information Name Address Phone [address] [place] [id number] [phone] Patient Information Patient Name Sex  Lissett Chowdhury ([id number]) Female 1995 Discharge Information Admitting Provider Service Area Unit  
 Saray Michaud MD / [address] [facility] [address] Triage / [phone] Discharge Provider Discharge Date/Time Discharge Disposition Destination (none) (none) (none) (none) Patient Language Language ENGLISH [13] Hospital Problems as of 9/10/2018  Reviewed: 2018  2:10 PM by Pinky Lin NP Class Noted - Resolved Last Modified POA Active Problems Normal IUP (intrauterine pregnancy) on prenatal ultrasound  9/10/2018 - Present 9/10/2018 by Saray Michaud MD Unknown Entered by Saray Michaud MD  
  
Non-Hospital Problems as of 9/10/2018  Reviewed: 2018  2:10 PM by Pinky Lin NP Class Noted - Resolved Last Modified Active Problems Obesity affecting pregnancy in second trimester  7/3/2018 - Present 2018 by Pinky Lin NP Entered by Pinky Lin NP Overview Addendum 2018 12:59 PM by Pinky Lin NP Early 1 hr GTT - 97; NL 1 hr GTT at 28 wks (124) NST's biweekly at 37 weeks   Supervision of high risk pregnancy in third trimester  7/3/2018 - Present 8/21/2018 by Emma Almaraz NP Entered by Emma Almaraz NP Late prenatal care  7/3/2018 - Present 8/27/2018 by Claudetta Brandy, MD  
  Entered by Emma Almaraz NP Overview Addendum 8/27/2018  5:06 PM by Claudetta Brandy, MD  
   7/6/18  Female fetus. EFW 89%., LILLIANA 11/12/18. 
8/27/18  EFW 86%. , 1571 grams. Rubella non-immune status, antepartum  7/7/2018 - Present 7/7/2018 by Emma Almaraz NP Entered by Emma Almaraz NP Maternal varicella, non-immune  7/7/2018 - Present 7/7/2018 by Emma Almaraz NP Entered by Emma Almaraz NP Cholestasis during pregnancy in third trimester  8/26/2018 - Present 8/26/2018 by Claudetta Brandy, MD  
  Entered by Claudetta Brandy, MD  
  Decreased fetal movement  8/26/2018 - Present 8/26/2018 by Edgard Pabon MD  
  Entered by Edgard Pabon MD  
  Anemia during pregnancy in third trimester  8/29/2018 - Present 8/29/2018 by Emma Almaraz NP Entered by Emma Almaraz NP Overview Signed 8/29/2018  4:57 PM by Emma Almaraz NP  
   10.1 on 8/27 - OTC iron You are allergic to the following No active allergies Current Discharge Medication List  
  
ASK your doctor about these medications Dose & Instructions Dispensing Information Comments  
 amoxicillin 500 mg capsule Commonly known as:  AMOXIL Dose:  500 mg Take 500 mg by mouth. Refills:  0  
   
 PNV Comb #2-Iron-FA-Omega 3 29-1-400 mg Cmpk Take  by mouth. Refills:  0  
   
 ursodiol 300 mg capsule Commonly known as:  ACTIGALL Dose:  300 mg Take 300 mg by mouth three (3) times daily. Refills:  0 Current Immunizations Name Date Tdap 8/21/2018 Follow-up Information None Discharge Instructions None Chart Review Routing History No Routing History on File

## 2018-09-10 NOTE — IP AVS SNAPSHOT
Toni Gatica 
 
 
 [address] [facility] [address] 
[phone] Patient: Jose G Doctor MRN: [id number] Suzi Alejandre A check manuela indicates which time of day the medication should be taken. My Medications ASK your doctor about these medications Instructions Each Dose to Equal  
 Morning Noon Evening Bedtime  
 amoxicillin 500 mg capsule Commonly known as:  AMOXIL Your last dose was: Your next dose is: Take 500 mg by mouth. 500 mg PNV Comb #2-Iron-FA-Omega 3 29-1-400 mg Cmpk Your last dose was: Your next dose is: Take  by mouth. ursodiol 300 mg capsule Commonly known as:  ACTIGALL Your last dose was: Your next dose is: Take 300 mg by mouth three (3) times daily.   
 300 mg

## 2018-09-10 NOTE — IP AVS SNAPSHOT
[address] [place] [id number] 
[phone] Patient: Ted Rubi MRN: [id number] Juliano Steward About your hospitalization You were admitted on:  N/A You last received care in the:  [facility] [facility] You were discharged on:  September 10, 2018 Why you were hospitalized Your primary diagnosis was:  Not on File Your diagnoses also included:  Normal Iup (Intrauterine Pregnancy) On Prenatal Ultrasound Follow-up Information None Your Scheduled Appointments Thursday September 13, 2018 12:00 PM EDT  
NON-STRESS TEST with NURSE [facility] ([facility]) [address] [place] [address] Links [phone] Monday September 17, 2018  9:00 AM EDT  
NON-STRESS TEST with NURSE [facility] ([facility]) [address] [place] [address] Links [phone] [phone] Thursday September 20, 2018  9:00 AM EDT  
NON-STRESS TEST with NURSE [facility] ([facility]) [address] [place] [address] Links [id number] [phone] Discharge Orders None A check manuela indicates which time of day the medication should be taken. My Medications ASK your doctor about these medications Instructions Each Dose to Equal  
 Morning Noon Evening Bedtime  
 amoxicillin 500 mg capsule Commonly known as:  AMOXIL Your last dose was: Your next dose is: Take 500 mg by mouth. 500 mg PNV Comb #2-Iron-FA-Omega 3 29-1-400 mg Cmpk Your last dose was: Your next dose is: Take  by mouth. ursodiol 300 mg capsule Commonly known as:  ACTIGALL Your last dose was: Your next dose is: Take 300 mg by mouth three (3) times daily. 300 mg Discharge Instructions None Introducing [facility]! [employer] introduces [employer] patient portal. Now you can access parts of your medical record, email your doctor's office, and request medication refills online. 1. In your internet browser, go to [email or web address] 2. Click on the First Time User? Click Here link in the Sign In box. You will see the New Member Sign Up page. 3. Enter your [employer] Access Code exactly as it appears below. You will not need to use this code after youve completed the sign-up process. If you do not sign up before the expiration date, you must request a new code. · [employer] Access Code: [email or web address] Expires: 10/1/2018  2:39 PM 
 
4. Enter the last four digits of your Social Security Number (xxxx) and Date of Birth (mm/dd/yyyy) as indicated and click Submit. You will be taken to the next sign-up page. 5. Create a [employer] ID. This will be your [employer] login ID and cannot be changed, so think of one that is secure and easy to remember. 6. Create a [employer] password. You can change your password at any time. 7. Enter your Password Reset Question and Answer. This can be used at a later time if you forget your password. 8. Enter your e-mail address. You will receive e-mail notification when new information is available in [address] 9. Click Sign Up. You can now view and download portions of your medical record. 10. Click the Download Summary menu link to download a portable copy of your medical information. If you have questions, please visit the Frequently Asked Questions section of the [employer] website. Remember, [employer] is NOT to be used for urgent needs. For medical emergencies, dial 911. Now available from your iPhone and Android! Introducing [employer] As a [employer] patient, I wanted to make you aware of our electronic visit tool called [employer]. Patrice Brennan 24/7 allows you to connect within minutes with a medical provider 24 hours a day, seven days a week via a mobile device or tablet or logging into a secure website from your computer. You can access echoBase from anywhere in the [place]. A virtual visit might be right for you when you have a simple condition and feel like you just dont want to get out of bed, or cant get away from work for an appointment, when your regular Patrice Brennan provider is not available (evenings, weekends or holidays), or when youre out of town and need minor care. Electronic visits cost only $49 and if the Patrice Brennan 24/7 provider determines a prescription is needed to treat your condition, one can be electronically transmitted to a nearby pharmacy*. Please take a moment to enroll today if you have not already done so. The enrollment process is free and takes just a few minutes. To enroll, please download the Peter Single 24/7 sherman to your tablet or phone, or visit [email or web address] to enroll on your computer. And, as an [address] [place] [address] patient with a [employer] account, the results of your visits will be scanned into your electronic medical record and your primary care provider will be able to view the scanned results. We urge you to continue to see your regular Patrice Brennan provider for your ongoing medical care. And while your primary care provider may not be the one available when you seek a [employer] virtual visit, the peace of mind you get from getting a real diagnosis real time can be priceless. For more information on echoBase, view our Frequently Asked Questions (FAQs) at [email or web address]. Sincerely, 
 
Dagoberto Delgadillo MD 
Chief Medical Officer [employer] *:  certain medications cannot be prescribed via echoBase Unresulted Labs-Please follow up with your PCP about these lab tests Order Current Status BILE ACIDS, TOTAL In process Providers Seen During Your Hospitalization Provider Specialty Primary office phone Noemi Angeles MD Obstetrics & Gynecology [phone] Your Primary Care Physician (PCP) Primary Care Physician Office Phone Office Fax NONE ** None ** ** None ** You are allergic to the following No active allergies Recent Documentation Height Weight BMI OB Status Smoking Status 1.651 m 93.9 kg 34.45 kg/m2 Pregnant Never Smoker Emergency Contacts Name Discharge Info Relation Home Work Mobile Lauren Prather DISCHARGE CAREGIVER [3] Mother [14] [phone] Patient Belongings The following personal items are in your possession at time of discharge: 
                             
 
  
  
Discharge Instructions Attachments/References PREGNANCY: KICK COUNTS (ENGLISH) PREGNANCY: WEEKS 30 TO 32 (ENGLISH) EARLY STAGE OF LABOR AT HOME (ENGLISH) PREGNANCY: WHEN TO CALL (AFTER 20 WEEKS): GENERAL INFO (ENGLISH) Patient Handouts Counting Your Baby's Kicks: Care Instructions Your Care Instructions Counting your baby's kicks is one way your doctor can tell that your baby is healthy. Most women-especially in a first pregnancy-feel their baby move for the first time between 16 and 22 weeks. The movement may feel like flutters rather than kicks. Your baby may move more at certain times of the day. When you are active, you may notice less kicking than when you are resting. At your prenatal visits, your doctor will ask whether the baby is active. In your last trimester, your doctor may ask you to count the number of times you feel your baby move. Follow-up care is a key part of your treatment and safety. Be sure to make and go to all appointments, and call your doctor if you are having problems. It's also a good idea to know your test results and keep a list of the medicines you take. How do you count fetal kicks? · A common method of checking your baby's movement is to count the number of kicks or moves you feel in 1 hour. Ten movements (such as kicks, flutters, or rolls) in 1 hour are normal. Some doctors suggest that you count in the morning until you get to 10 movements. Then you can quit for that day and start again the next day. · Pick your baby's most active time of day to count. This may be any time from morning to evening. · If you do not feel 10 movements in an hour, your baby may be sleeping. Wait for the next hour and count again. When should you call for help? Call your doctor now or seek immediate medical care if: 
  · You noticed that your baby has stopped moving or is moving much less than normal.  
 Watch closely for changes in your health, and be sure to contact your doctor if you have any problems. Where can you learn more? Go to http://pat-glen.info/. Enter Q192 in the search box to learn more about \"Counting Your Baby's Kicks: Care Instructions. \" Current as of: November 21, 2017 Content Version: 11.7 © 4543-1198 [employer]. Care instructions adapted under license by [employer] (which disclaims liability or warranty for this information). If you have questions about a medical condition or this instruction, always ask your healthcare professional. [place] [address] any warranty or liability for your use of this information. Weeks 30 to 32 of Your Pregnancy: Care Instructions Your Care Instructions You have made it to the final months of your pregnancy. By now, your baby is really starting to look like a baby, with hair and plump skin. As you enter the final weeks of pregnancy, the reality of having a baby may start to set in.  This is the time to settle on a name, get your household in order, set up a safe nursery, and find quality  if needed. Doing these things in advance will allow you to focus on caring for and enjoying your new baby. You may also want to have a tour of your hospital's labor and delivery unit to get a better idea of what to expect while you are in the hospital. 
During these last months, it is very important to take good care of yourself and pay attention to what your body needs. If your doctor says it is okay for you to work, don't push yourself too hard. Use the tips provided in this care sheet to ease heartburn and care for varicose veins. If you haven't already had the Tdap shot during this pregnancy, talk to your doctor about getting it. It will help protect your  against pertussis infection. Follow-up care is a key part of your treatment and safety. Be sure to make and go to all appointments, and call your doctor if you are having problems. It's also a good idea to know your test results and keep a list of the medicines you take. How can you care for yourself at home? Pay attention to your baby's movements · You should feel your baby move several times every day. · Your baby now turns less, and kicks and jabs more. · Your baby sleeps 20 to 45 minutes at a time and is more active at certain times of day. · If your doctor wants you to count your baby's kicks: 
¨ Empty your bladder, and lie on your side or relax in a comfortable chair. ¨ Write down your start time. ¨ Pay attention only to your baby's movements. Count any movement except hiccups. ¨ After you have counted 10 movements, write down your stop time. ¨ Write down how many minutes it took for your baby to move 10 times. ¨ If an hour goes by and you have not recorded 10 movements, have something to eat or drink and then count for another hour. If you do not record 10 movements in either hour, call your doctor. Ease heartburn · Eat small, frequent meals. · Do not eat chocolate, peppermint, or very spicy foods.  Avoid drinks with caffeine, such as coffee, tea, and sodas. · Avoid bending over or lying down after meals. · Talk a short walk after you eat. · If heartburn is a problem at night, do not eat for 2 hours before bedtime. · Take antacids like Mylanta, Maalox, Rolaids, or Tums. Do not take antacids that have sodium bicarbonate. Care for varicose veins · Varicose veins are blood vessels that stretch out with the extra blood during pregnancy. Your legs may ache or throb. Most varicose veins will go away after the birth. · Avoid standing for long periods of time. Sit with your legs crossed at the ankles, not the knees. · Sit with your feet propped up. · Avoid tight clothing or stockings. Wear support hose. · Exercise regularly. Try walking for at least 30 minutes a day. Where can you learn more? Go to http://pat-glen.info/. Enter P415 in the search box to learn more about \"Weeks 30 to 32 of Your Pregnancy: Care Instructions. \" Current as of: November 21, 2017 Content Version: 11.7 © 5495-9016 [employer]. Care instructions adapted under license by [employer] (which disclaims liability or warranty for this information). If you have questions about a medical condition or this instruction, always ask your healthcare professional. [place] [address] any warranty or liability for your use of this information. Early Stage of Labor at Home: Care Instructions Your Care Instructions If you came to the hospital while in early labor, your doctor may have asked if you want to labor at home until your contractions are stronger. Many women stay at home during early labor. This is often the longest part of the birthing process. It may last up to 2 to 3 days. Contractions are mild to moderate and shorter (about 30 to 45 seconds). You can usually keep talking during them.  Contractions may also be irregular, about 5 to 20 minutes apart. They may even stop for a while. It helps to stay as relaxed as you can during this time. You can spend some or all of your early labor at home or anywhere else you may be comfortable. If you live far from the hospital or birthing center, you may want to think about going somewhere nearby so you can get back to the hospital quickly. For some women, there may be benefits to staying home during early labor, such as avoiding medicines or procedures. As labor progresses, you'll shift from early labor to active labor. During this time, contractions get more intense. They occur more often, about every 2 to 3 minutes. They also last longer, about 50 to 70 seconds. You will feel them even when you change positions and walk or move around. It may be hard to tell if you are in active labor. If you aren't sure, call your doctor or midwife. As your labor progresses, check in with your doctor or midwife about when to come back to the hospital or birthing center. You may have special instructions if your water broke or you tested positive for group B strep. Follow-up care is a key part of your treatment and safety. Be sure to make and go to all appointments, and call your doctor if you are having problems. It's also a good idea to know your test results and keep a list of the medicines you take. How can you care for yourself at home? · Get support. Having a support person with you from early labor until after childbirth can have a positive effect on childbirth. · Find distractions. During early labor, you can walk, play cards, watch TV, or listen to music to help take your mind off your contractions. · Ask your partner, labor , or  for a massage. Shoulder and low back massage during contractions may ease your pain. Strong massage of the back muscles (counterpressure) during contractions may help relieve the pain of back labor. Tell your labor  exactly where to push and how hard to push. · Use imagery. This means using your imagination to decrease your pain. For instance, to help manage pain, picture your contractions as waves rolling over you. Picture a peaceful place, such as a beach or mountain stream, to help you relax between contractions. · Change positions during labor. Walking, kneeling, or sitting on a big rubber ball (birth ball) are good options. · Use focused breathing techniques. Breathing in a rhythm can distract you from pain. · Take a warm shower or bath. Warm water may ease pain and stress. When should you call for help? Call 911 anytime you think you may need emergency care. For example, call if: 
  · You passed out (lost consciousness).  
  · You have severe vaginal bleeding.  
  · You have severe pain in your belly or pelvis.  
  · You have had fluid gushing or leaking from your vagina and you know or think the umbilical cord is bulging into your vagina. If this happens, immediately get down on your knees so your rear end (buttocks) is higher than your head. This will decrease the pressure on the cord until help arrives.  
[facility] your doctor now or seek immediate medical care if: 
  · You have new or worse signs of preeclampsia, such as: 
¨ Sudden swelling of your face, hands, or feet. ¨ New vision problems (such as dimness or blurring). ¨ A severe headache.  
  · You have any vaginal bleeding.  
  · You have belly pain or cramping.  
  · You have a fever.  
  · You have had regular contractions (with or without pain) for an hour. This means that you have 8 or more within 1 hour or 4 or more in 20 minutes after you change your position and drink fluids.  
  · You have a sudden release of fluid from your vagina.  
  · You have low back pain or pelvic pressure that does not go away.  
  · You notice that your baby has stopped moving or is moving much less than normal.  
 Watch closely for changes in your health, and be sure to contact your doctor if you have any problems. Where can you learn more? Go to http://pat-glen.info/. Enter B623 in the search box to learn more about \"Early Stage of Labor at Home: Care Instructions. \" Current as of: November 21, 2017 Content Version: 11.7 © 4162-9650 [employer]. Care instructions adapted under license by [employer] (which disclaims liability or warranty for this information). If you have questions about a medical condition or this instruction, always ask your healthcare professional. [address] any warranty or liability for your use of this information. Learning About When to Call Your Doctor During Pregnancy (After 20 Weeks) Your Care Instructions It's common to have concerns about what might be a problem during pregnancy. Although most pregnant women don't have any serious problems, it's important to know when to call your doctor if you have certain symptoms or signs of labor. These are general suggestions. Your doctor may give you some more information about when to call. When to call your doctor (after 20 weeks) Call 911 anytime you think you may need emergency care. For example, call if: 
· You have severe vaginal bleeding. · You have sudden, severe pain in your belly. · You passed out (lost consciousness). · You have a seizure. · You see or feel the umbilical cord. · You think you are about to deliver your baby and can't make it safely to the hospital. 
Call your doctor now or seek immediate medical care if: 
· You have vaginal bleeding. · You have belly pain. · You have a fever. · You have symptoms of preeclampsia, such as: 
¨ Sudden swelling of your face, hands, or feet. ¨ New vision problems (such as dimness or blurring). ¨ A severe headache. · You have a sudden release of fluid from your vagina. (You think your water broke.) · You think that you may be in labor.  This means that you've had at least 4 contractions within 20 minutes or at least 8 contractions in an hour. · You notice that your baby has stopped moving or is moving much less than normal. 
· You have symptoms of a urinary tract infection. These may include: 
¨ Pain or burning when you urinate. ¨ A frequent need to urinate without being able to pass much urine. ¨ Pain in the flank, which is just below the rib cage and above the waist on either side of the back. ¨ Blood in your urine. Watch closely for changes in your health, and be sure to contact your doctor if: 
· You have vaginal discharge that smells bad. · You have skin changes, such as: ¨ A rash. ¨ Itching. ¨ Yellow color to your skin. · You have other concerns about your pregnancy. If you have labor signs at 37 weeks or more If you have signs of labor at 37 weeks or more, your doctor may tell you to call when your labor becomes more active. Symptoms of active labor include: 
· Contractions that are regular. · Contractions that are less than 5 minutes apart. · Contractions that are hard to talk through. Follow-up care is a key part of your treatment and safety. Be sure to make and go to all appointments, and call your doctor if you are having problems. It's also a good idea to know your test results and keep a list of the medicines you take. Where can you learn more? Go to http://pat-glen.info/. Enter  in the search box to learn more about \"Learning About When to Call Your Doctor During Pregnancy (After 20 Weeks). \" 
Current as of: November 21, 2017 Content Version: 11.7 © 3234-0005 [employer]. Care instructions adapted under license by [employer] (which disclaims liability or warranty for this information). If you have questions about a medical condition or this instruction, always ask your healthcare professional. [place] [address] any warranty or liability for your use of this information. Please provide this summary of care documentation to your next provider. Signatures-by signing, you are acknowledging that this After Visit Summary has been reviewed with you and you have received a copy. Patient Signature:  ____________________________________________________________ Date:  ____________________________________________________________  
  
Ana Cristina Albertina Provider Signature:  ____________________________________________________________ Date:  ____________________________________________________________

## 2018-09-12 LAB — BILE AC SERPL-SCNC: 10.8 UMOL/L (ref 4.7–24.5)

## 2018-09-13 ENCOUNTER — HOSPITAL ENCOUNTER (EMERGENCY)
Age: 23
Discharge: HOME | End: 2018-09-13
Attending: OBSTETRICS & GYNECOLOGY | Admitting: OBSTETRICS & GYNECOLOGY
Payer: MEDICAID

## 2018-09-13 VITALS
SYSTOLIC BLOOD PRESSURE: 110 MMHG | HEART RATE: 78 BPM | TEMPERATURE: 97.4 F | WEIGHT: 210 LBS | DIASTOLIC BLOOD PRESSURE: 60 MMHG | HEIGHT: 65 IN | RESPIRATION RATE: 16 BRPM | BODY MASS INDEX: 34.99 KG/M2

## 2018-09-13 PROBLEM — Z33.1 IUP (INTRAUTERINE PREGNANCY), INCIDENTAL: Status: ACTIVE | Noted: 2018-09-13

## 2018-09-13 PROBLEM — O47.9 IRREGULAR UTERINE CONTRACTIONS: Status: ACTIVE | Noted: 2018-09-13

## 2018-09-13 PROBLEM — Z3A.31 31 WEEKS GESTATION OF PREGNANCY: Status: ACTIVE | Noted: 2018-09-13

## 2018-09-13 LAB
ALBUMIN SERPL-MCNC: 2.7 G/DL (ref 3.4–5)
ALBUMIN/GLOB SERPL: 0.6 {RATIO} (ref 0.8–1.7)
ALP SERPL-CCNC: 97 U/L (ref 45–117)
ALT SERPL-CCNC: 33 U/L (ref 13–56)
ANION GAP SERPL CALC-SCNC: 13 MMOL/L (ref 3–18)
APPEARANCE UR: CLEAR
AST SERPL-CCNC: 18 U/L (ref 15–37)
BASOPHILS # BLD: 0 K/UL (ref 0–0.1)
BASOPHILS NFR BLD: 0 % (ref 0–2)
BILIRUB SERPL-MCNC: 0.2 MG/DL (ref 0.2–1)
BILIRUB UR QL: NEGATIVE
BUN SERPL-MCNC: 6 MG/DL (ref 7–18)
BUN/CREAT SERPL: 10 (ref 12–20)
CALCIUM SERPL-MCNC: 9.2 MG/DL (ref 8.5–10.1)
CHLORIDE SERPL-SCNC: 105 MMOL/L (ref 100–108)
CO2 SERPL-SCNC: 23 MMOL/L (ref 21–32)
COLOR UR: YELLOW
CREAT SERPL-MCNC: 0.62 MG/DL (ref 0.6–1.3)
DIFFERENTIAL METHOD BLD: ABNORMAL
EOSINOPHIL # BLD: 0.1 K/UL (ref 0–0.4)
EOSINOPHIL NFR BLD: 1 % (ref 0–5)
ERYTHROCYTE [DISTWIDTH] IN BLOOD BY AUTOMATED COUNT: 12.8 % (ref 11.6–14.5)
GLOBULIN SER CALC-MCNC: 4.6 G/DL (ref 2–4)
GLUCOSE SERPL-MCNC: 79 MG/DL (ref 74–99)
GLUCOSE UR QL STRIP.AUTO: NEGATIVE MG/DL
HCT VFR BLD AUTO: 34.5 % (ref 35–45)
HGB BLD-MCNC: 11.6 G/DL (ref 12–16)
KETONES UR-MCNC: NEGATIVE MG/DL
LEUKOCYTE ESTERASE UR QL STRIP: NEGATIVE
LYMPHOCYTES # BLD: 3.1 K/UL (ref 0.9–3.6)
LYMPHOCYTES NFR BLD: 26 % (ref 21–52)
MCH RBC QN AUTO: 29.4 PG (ref 24–34)
MCHC RBC AUTO-ENTMCNC: 33.6 G/DL (ref 31–37)
MCV RBC AUTO: 87.6 FL (ref 74–97)
MONOCYTES # BLD: 0.8 K/UL (ref 0.05–1.2)
MONOCYTES NFR BLD: 7 % (ref 3–10)
NEUTS SEG # BLD: 8 K/UL (ref 1.8–8)
NEUTS SEG NFR BLD: 66 % (ref 40–73)
NITRITE UR QL: NEGATIVE
PH UR: 5.5 [PH] (ref 5–9)
PLATELET # BLD AUTO: 311 K/UL (ref 135–420)
PMV BLD AUTO: 10.3 FL (ref 9.2–11.8)
POTASSIUM SERPL-SCNC: 3.5 MMOL/L (ref 3.5–5.5)
PROT SERPL-MCNC: 7.3 G/DL (ref 6.4–8.2)
PROT UR QL: NEGATIVE MG/DL
RBC # BLD AUTO: 3.94 M/UL (ref 4.2–5.3)
RBC # UR STRIP: NEGATIVE /UL
SERVICE CMNT-IMP: NORMAL
SODIUM SERPL-SCNC: 141 MMOL/L (ref 136–145)
SP GR UR: 1.01 (ref 1–1.02)
UROBILINOGEN UR QL: 0.2 EU/DL (ref 0.2–1)
WBC # BLD AUTO: 11.9 K/UL (ref 4.6–13.2)

## 2018-09-13 PROCEDURE — 80053 COMPREHEN METABOLIC PANEL: CPT | Performed by: OBSTETRICS & GYNECOLOGY

## 2018-09-13 PROCEDURE — 99285 EMERGENCY DEPT VISIT HI MDM: CPT

## 2018-09-13 PROCEDURE — 59025 FETAL NON-STRESS TEST: CPT

## 2018-09-13 PROCEDURE — 96360 HYDRATION IV INFUSION INIT: CPT

## 2018-09-13 PROCEDURE — 74011250637 HC RX REV CODE- 250/637: Performed by: OBSTETRICS & GYNECOLOGY

## 2018-09-13 PROCEDURE — 74011250636 HC RX REV CODE- 250/636: Performed by: OBSTETRICS & GYNECOLOGY

## 2018-09-13 PROCEDURE — 76815 OB US LIMITED FETUS(S): CPT

## 2018-09-13 PROCEDURE — 81003 URINALYSIS AUTO W/O SCOPE: CPT

## 2018-09-13 PROCEDURE — 85025 COMPLETE CBC W/AUTO DIFF WBC: CPT | Performed by: OBSTETRICS & GYNECOLOGY

## 2018-09-13 PROCEDURE — 96361 HYDRATE IV INFUSION ADD-ON: CPT

## 2018-09-13 RX ADMIN — SODIUM CHLORIDE, SODIUM LACTATE, POTASSIUM CHLORIDE, AND CALCIUM CHLORIDE 1000 ML: 600; 310; 30; 20 INJECTION, SOLUTION INTRAVENOUS at 21:10

## 2018-09-13 RX ADMIN — ALUMINUM HYDROXIDE AND MAGNESIUM HYDROXIDE 30 ML: 200; 200 SUSPENSION ORAL at 22:29

## 2018-09-13 NOTE — IP AVS SNAPSHOT
Summary of Care Report The Summary of Care report has been created to help improve care coordination. Users with access to [employer] or [address] (Web-based application) may access additional patient information including the Discharge Summary. If you are not currently a [address] user and need more information, please call the number listed below in the [place] [address] section and ask to be connected with Medical Records. Facility Information Name Address Phone [address] [place], [place] [id number] [phone] Patient Information Patient Name Sex  Amy Villalta ([id number]) Female 1995 Discharge Information Admitting Provider Service Area Unit  
 Sheng Sal MD / [address] [facility] [address] Triage / [phone] Discharge Provider Discharge Date/Time Discharge Disposition Destination (none) (none) (none) (none) Patient Language Language ENGLISH [13] Hospital Problems as of 2018  Reviewed: 2018  2:10 PM by Aidee Vela NP Class Noted - Resolved Last Modified POA Active Problems Obesity affecting pregnancy in second trimester  7/3/2018 - Present 2018 by Reji Lang MD Yes Entered by Aidee Vela NP Overview Addendum 2018 12:59 PM by Aidee Vela NP Early 1 hr GTT - 97; NL 1 hr GTT at 28 wks (124) NST's biweekly at 37 weeks Late prenatal care  7/3/2018 - Present 2018 by Reji Lang MD Yes Entered by Aidee Vela NP Overview Addendum 2018  5:06 PM by Sheng Sal MD  
   18  Female fetus. EFW 89%., LILLIANA 18. 
18  EFW 86%. , 1571 grams. Anemia during pregnancy in third trimester  2018 - Present 2018 by Reji Lang MD Yes   Entered by Aidee Vela NP  
 Overview Signed 8/29/2018  4:57 PM by Roman Cline, NP  
   10.1 on 8/27 - OTC iron 
  
  
  IUP (intrauterine pregnancy), incidental  9/13/2018 - Present 9/13/2018 by Dennis Marina MD Yes Entered by Gonzalez Rodriguez MD  
  31 weeks gestation of pregnancy  9/13/2018 - Present 9/13/2018 by Dennis Marina MD Yes Entered by Dennis Marina MD  
  * (Principal)Irregular uterine contractions  9/13/2018 - Present 9/13/2018 by Dennis Marina MD Yes Entered by Dennis Marina MD  
  
Non-Hospital Problems as of 9/13/2018  Reviewed: 9/4/2018  2:10 PM by Roman Cline, AMARIS Class Noted - Resolved Last Modified Active Problems Supervision of high risk pregnancy in third trimester  7/3/2018 - Present 8/21/2018 by Roman Cline, NP Entered by Roman Cline, NP  
  Rubella non-immune status, antepartum  7/7/2018 - Present 7/7/2018 by Roman Cline, NP Entered by Roman Cline, NP Maternal varicella, non-immune  7/7/2018 - Present 7/7/2018 by Roman Cline, NP Entered by Roman Cline, NP Cholestasis during pregnancy in third trimester  8/26/2018 - Present 8/26/2018 by Gonzalez Rodriguez MD  
  Entered by Gonzalez Rodriguez MD  
  Decreased fetal movement  8/26/2018 - Present 8/26/2018 by Paul Obando MD  
  Entered by Paul Obando MD  
  Normal IUP (intrauterine pregnancy) on prenatal ultrasound  9/10/2018 - Present 9/10/2018 by Gonzalez Rodriguez MD  
  Entered by Gonzalez Rodriguez MD  
  
You are allergic to the following No active allergies Current Discharge Medication List  
  
ASK your doctor about these medications Dose & Instructions Dispensing Information Comments PNV Comb #2-Iron-FA-Omega 3 29-1-400 mg Cmpk Take  by mouth. Refills:  0  
   
 ursodiol 300 mg capsule Commonly known as:  ACTIGALL Dose:  300 mg Take 300 mg by mouth three (3) times daily. Refills:  0 Current Immunizations Name Date Tdap 2018 Follow-up Information None Discharge Instructions Weeks 32 to 34 of Your Pregnancy: Care Instructions Your Care Instructions During the last few weeks of your pregnancy, you may have more aches and pains. It's important to rest when you can. Your growing baby is putting more pressure on your bladder. So you may need to urinate more often. Hemorrhoids are also common. These are painful, itchy veins in the rectal area. In the 36th week, most women have a test for group B streptococcus (GBS). GBS is a common bacteria that can live in the vagina and rectum. It can make your baby sick after birth. If you test positive, you will get antibiotics during labor. These will keep your baby from getting the bacteria. You may want to talk with your doctor about banking your baby's umbilical cord blood. This is the blood left in the cord after birth. If you want to save this blood, you must arrange it ahead of time. You can't decide at the last minute. If you haven't already had the Tdap shot during this pregnancy, talk to your doctor about getting it. It will help protect your  against pertussis infection. Follow-up care is a key part of your treatment and safety. Be sure to make and go to all appointments, and call your doctor if you are having problems. It's also a good idea to know your test results and keep a list of the medicines you take. How can you care for yourself at home? Ease hemorrhoids · Get more liquids, fruits, vegetables, and fiber in your diet. This will help keep your stools soft. · Avoid sitting for too long. Lie on your left side several times a day. · Clean yourself with soft, moist toilet paper. Or you can use witch hazel pads or personal hygiene pads. · If you are uncomfortable, try ice packs. Or you can sit in a warm sitz bath. Do these for 20 minutes at a time, as needed. · Use hydrocortisone cream for pain and itching. Two examples are Anusol and Preparation H Hydrocortisone. · Ask your doctor about taking an over-the-counter stool softener. Consider breastfeeding · Experts recommend that women breastfeed for 1 year or longer. Breast milk is the perfect food for babies. · Breast milk is easier for babies to digest than formula. And it is always available, just the right temperature, and free. · Breast milk may help protect your child from some health problems.  babies are less likely than formula-fed babies to: ¨ Get ear infections, colds, diarrhea, and pneumonia. ¨ Be obese or get diabetes later in life. · Women who breastfeed have less bleeding after the birth. Their uteruses also shrink back faster. · Some women who breastfeed lose weight faster. Making milk burns calories. · Breastfeeding can lower your risk of breast cancer, ovarian cancer, and osteoporosis. Decide about circumcision for boys · As you make this decision, it may help to think about your personal, Gnosticist, and family traditions. You get to decide if you will keep your son's penis natural or if he will be circumcised. · If you decide that you would like to have your baby circumcised, talk with your doctor. You can share your concerns about pain. And you can discuss your preferences for anesthesia. Where can you learn more? Go to http://pat-glen.info/. Enter A031 in the search box to learn more about \"Weeks 32 to 34 of Your Pregnancy: Care Instructions. \" Current as of: November 21, 2017 Content Version: 11.7 © 3105-0738 [employer]. Care instructions adapted under license by [employer] (which disclaims liability or warranty for this information).  If you have questions about a medical condition or this instruction, always ask your healthcare professional. [place] [address] any warranty or liability for your use of this information. Pregnancy Precautions: Care Instructions Your Care Instructions There is no sure way to prevent labor before your due date ( labor) or to prevent most other pregnancy problems. But there are things you can do to increase your chances of a healthy pregnancy. Go to your appointments, follow your doctor's advice, and take good care of yourself. Eat well, and exercise (if your doctor agrees). And make sure to drink plenty of water. Follow-up care is a key part of your treatment and safety. Be sure to make and go to all appointments, and call your doctor if you are having problems. It's also a good idea to know your test results and keep a list of the medicines you take. How can you care for yourself at home? · Make sure you go to your prenatal appointments. At each visit, your doctor will check your blood pressure. Your doctor will also check to see if you have protein in your urine. High blood pressure and protein in urine are signs of preeclampsia. This condition can be dangerous for you and your baby. · Drink plenty of fluids, enough so that your urine is light yellow or clear like water. Dehydration can cause contractions. If you have kidney, heart, or liver disease and have to limit fluids, talk with your doctor before you increase the amount of fluids you drink. · Tell your doctor right away if you notice any symptoms of an infection, such as: ¨ Burning when you urinate. ¨ A foul-smelling discharge from your vagina. ¨ Vaginal itching. ¨ Unexplained fever. ¨ Unusual pain or soreness in your uterus or lower belly. · Eat a balanced diet. Include plenty of foods that are high in calcium and iron. ¨ Foods high in calcium include milk, cheese, yogurt, almonds, and broccoli. ¨ Foods high in iron include red meat, shellfish, poultry, eggs, beans, raisins, whole-grain bread, and leafy green vegetables. · Do not smoke.  If you need help quitting, talk to your doctor about stop-smoking programs and medicines. These can increase your chances of quitting for good. · Do not drink alcohol or use illegal drugs. · Follow your doctor's directions about activity. Your doctor will let you know how much, if any, exercise you can do. · Ask your doctor if you can have sex. If you are at risk for early labor, your doctor may ask you to not have sex. · Take care to prevent falls. During pregnancy, your joints are loose, and your balance is off. Sports such as bicycling, skiing, or in-line skating can increase your risk of falling. And don't ride horses or motorcycles, dive, water ski, scuba dive, or parachute jump while you are pregnant. · Avoid getting very hot. Do not use saunas or hot tubs. Avoid staying out in the sun in hot weather for long periods. Take acetaminophen (Tylenol) to lower a high fever. · Do not take any over-the-counter or herbal medicines or supplements without talking to your doctor or pharmacist first. 
When should you call for help? Call 911 anytime you think you may need emergency care. For example, call if: 
  · You passed out (lost consciousness).  
  · You have severe vaginal bleeding.  
  · You have severe pain in your belly or pelvis.  
  · You have had fluid gushing or leaking from your vagina and you know or think the umbilical cord is bulging into your vagina. If this happens, immediately get down on your knees so your rear end (buttocks) is higher than your head. This will decrease the pressure on the cord until help arrives.  
[facility] your doctor now or seek immediate medical care if: 
  · You have signs of preeclampsia, such as: 
¨ Sudden swelling of your face, hands, or feet. ¨ New vision problems (such as dimness or blurring). ¨ A severe headache.  
  · You have any vaginal bleeding.  
  · You have belly pain or cramping.  
  · You have a fever.  
  · You have had regular contractions (with or without pain) for an hour. This means that you have 8 or more within 1 hour or 4 or more in 20 minutes after you change your position and drink fluids.  
  · You have a sudden release of fluid from your vagina.  
  · You have low back pain or pelvic pressure that does not go away.  
  · You notice that your baby has stopped moving or is moving much less than normal.  
 Watch closely for changes in your health, and be sure to contact your doctor if you have any problems. Where can you learn more? Go to http://pat-glen.info/. Enter [id number] in the search box to learn more about \"Pregnancy Precautions: Care Instructions. \" Current as of: November 21, 2017 Content Version: 11.7 © 1183-3058 [employer]. Care instructions adapted under license by [employer] (which disclaims liability or warranty for this information). If you have questions about a medical condition or this instruction, always ask your healthcare professional. [place] [address] any warranty or liability for your use of this information. Counting Your Baby's Kicks: Care Instructions Your Care Instructions Counting your baby's kicks is one way your doctor can tell that your baby is healthy. Most women-especially in a first pregnancy-feel their baby move for the first time between 16 and 22 weeks. The movement may feel like flutters rather than kicks. Your baby may move more at certain times of the day. When you are active, you may notice less kicking than when you are resting. At your prenatal visits, your doctor will ask whether the baby is active. In your last trimester, your doctor may ask you to count the number of times you feel your baby move. Follow-up care is a key part of your treatment and safety. Be sure to make and go to all appointments, and call your doctor if you are having problems. It's also a good idea to know your test results and keep a list of the medicines you take. How do you count fetal kicks? · A common method of checking your baby's movement is to count the number of kicks or moves you feel in 1 hour. Ten movements (such as kicks, flutters, or rolls) in 1 hour are normal. Some doctors suggest that you count in the morning until you get to 10 movements. Then you can quit for that day and start again the next day. · Pick your baby's most active time of day to count. This may be any time from morning to evening. · If you do not feel 10 movements in an hour, your baby may be sleeping. Wait for the next hour and count again. When should you call for help? Call your doctor now or seek immediate medical care if: 
  · You noticed that your baby has stopped moving or is moving much less than normal.  
 Watch closely for changes in your health, and be sure to contact your doctor if you have any problems. Where can you learn more? Go to http://pat-glen.info/. Enter W653 in the search box to learn more about \"Counting Your Baby's Kicks: Care Instructions. \" Current as of: November 21, 2017 Content Version: 11.7 © 5225-9332 [employer]. Care instructions adapted under license by [employer] (which disclaims liability or warranty for this information). If you have questions about a medical condition or this instruction, always ask your healthcare professional. [place] [address] any warranty or liability for your use of this information. Chart Review Routing History No Routing History on File

## 2018-09-13 NOTE — PROGRESS NOTES
, 31w3d gestation arrived to L&D c/o contractions or abdominal pain, pt denies vaginal bleeding or LOF. Pt taken to Triage bed 1. Oriented to room and call light. Urine sample obtained.  EFM applied. Pt states she had intercourse last night. SVE closed/thick/high. Unable to perform FFN.  Dr. Gonzales Roberts paged and was immediately connected. Informed him of information above. NO FFN completed due to intercourse within 24 hours. SVE closed/thick/high. Orders received to start IV fluids and prolonged monitoring. Dr. Gonzales Roberts will come in to assess patient once finished in office.  DR. Gonzales Roberts at bedside. Vaginal ultrasound performed. Cervix closed and thick > 4.0cm. Lab orders received for CBC and CMP. Pain started after eating pizza. Orders received to give PO Maalox. If labs are WNL pt may be discharged home. [address] [facility] Pt to be discharged home. 80   Pt discharged home. Verbal and written discharge instructions given, pt verbalizes understanding. Pt ambulated of unit in stable condition with significant other by her side.

## 2018-09-13 NOTE — IP AVS SNAPSHOT
[address] [place] [id number] 
[phone] Patient: Hussein Yepez MRN: [id number] Zohaib Riley A check manuela indicates which time of day the medication should be taken. My Medications ASK your doctor about these medications Instructions Each Dose to Equal  
 Morning Noon Evening Bedtime PNV Comb #2-Iron-FA-Omega 3 29-1-400 mg Cmpk Your last dose was: Your next dose is: Take  by mouth. ursodiol 300 mg capsule Commonly known as:  ACTIGALL Your last dose was: Your next dose is: Take 300 mg by mouth three (3) times daily.   
 300 mg

## 2018-09-14 NOTE — DISCHARGE INSTRUCTIONS
Weeks 32 to 34 of Your Pregnancy: Care Instructions  Your Care Instructions    During the last few weeks of your pregnancy, you may have more aches and pains. It's important to rest when you can. Your growing baby is putting more pressure on your bladder. So you may need to urinate more often. Hemorrhoids are also common. These are painful, itchy veins in the rectal area. In the 36th week, most women have a test for group B streptococcus (GBS). GBS is a common bacteria that can live in the vagina and rectum. It can make your baby sick after birth. If you test positive, you will get antibiotics during labor. These will keep your baby from getting the bacteria. You may want to talk with your doctor about banking your baby's umbilical cord blood. This is the blood left in the cord after birth. If you want to save this blood, you must arrange it ahead of time. You can't decide at the last minute. If you haven't already had the Tdap shot during this pregnancy, talk to your doctor about getting it. It will help protect your  against pertussis infection. Follow-up care is a key part of your treatment and safety. Be sure to make and go to all appointments, and call your doctor if you are having problems. It's also a good idea to know your test results and keep a list of the medicines you take. How can you care for yourself at home? Ease hemorrhoids  · Get more liquids, fruits, vegetables, and fiber in your diet. This will help keep your stools soft. · Avoid sitting for too long. Lie on your left side several times a day. · Clean yourself with soft, moist toilet paper. Or you can use witch hazel pads or personal hygiene pads. · If you are uncomfortable, try ice packs. Or you can sit in a warm sitz bath. Do these for 20 minutes at a time, as needed. · Use hydrocortisone cream for pain and itching. Two examples are Anusol and Preparation H Hydrocortisone.   · Ask your doctor about taking an over-the-counter stool softener. Consider breastfeeding  · Experts recommend that women breastfeed for 1 year or longer. Breast milk is the perfect food for babies. · Breast milk is easier for babies to digest than formula. And it is always available, just the right temperature, and free. · Breast milk may help protect your child from some health problems.  babies are less likely than formula-fed babies to:  ¨ Get ear infections, colds, diarrhea, and pneumonia. ¨ Be obese or get diabetes later in life. · Women who breastfeed have less bleeding after the birth. Their uteruses also shrink back faster. · Some women who breastfeed lose weight faster. Making milk burns calories. · Breastfeeding can lower your risk of breast cancer, ovarian cancer, and osteoporosis. Decide about circumcision for boys  · As you make this decision, it may help to think about your personal, Yarsani, and family traditions. You get to decide if you will keep your son's penis natural or if he will be circumcised. · If you decide that you would like to have your baby circumcised, talk with your doctor. You can share your concerns about pain. And you can discuss your preferences for anesthesia. Where can you learn more? Go to http://pat-glen.info/. Enter Z646 in the search box to learn more about \"Weeks 32 to 34 of Your Pregnancy: Care Instructions. \"  Current as of: November 21, 2017  Content Version: 11.7  © 9062-3335 [employer]. Care instructions adapted under license by [employer] (which disclaims liability or warranty for this information). If you have questions about a medical condition or this instruction, always ask your healthcare professional. [place] [address] any warranty or liability for your use of this information.        Pregnancy Precautions: Care Instructions  Your Care Instructions    There is no sure way to prevent labor before your due date ( labor) or to prevent most other pregnancy problems. But there are things you can do to increase your chances of a healthy pregnancy. Go to your appointments, follow your doctor's advice, and take good care of yourself. Eat well, and exercise (if your doctor agrees). And make sure to drink plenty of water. Follow-up care is a key part of your treatment and safety. Be sure to make and go to all appointments, and call your doctor if you are having problems. It's also a good idea to know your test results and keep a list of the medicines you take. How can you care for yourself at home? · Make sure you go to your prenatal appointments. At each visit, your doctor will check your blood pressure. Your doctor will also check to see if you have protein in your urine. High blood pressure and protein in urine are signs of preeclampsia. This condition can be dangerous for you and your baby. · Drink plenty of fluids, enough so that your urine is light yellow or clear like water. Dehydration can cause contractions. If you have kidney, heart, or liver disease and have to limit fluids, talk with your doctor before you increase the amount of fluids you drink. · Tell your doctor right away if you notice any symptoms of an infection, such as:  ¨ Burning when you urinate. ¨ A foul-smelling discharge from your vagina. ¨ Vaginal itching. ¨ Unexplained fever. ¨ Unusual pain or soreness in your uterus or lower belly. · Eat a balanced diet. Include plenty of foods that are high in calcium and iron. ¨ Foods high in calcium include milk, cheese, yogurt, almonds, and broccoli. ¨ Foods high in iron include red meat, shellfish, poultry, eggs, beans, raisins, whole-grain bread, and leafy green vegetables. · Do not smoke. If you need help quitting, talk to your doctor about stop-smoking programs and medicines. These can increase your chances of quitting for good. · Do not drink alcohol or use illegal drugs.   · Follow your doctor's directions about activity. Your doctor will let you know how much, if any, exercise you can do. · Ask your doctor if you can have sex. If you are at risk for early labor, your doctor may ask you to not have sex. · Take care to prevent falls. During pregnancy, your joints are loose, and your balance is off. Sports such as bicycling, skiing, or in-line skating can increase your risk of falling. And don't ride horses or motorcycles, dive, water ski, scuba dive, or parachute jump while you are pregnant. · Avoid getting very hot. Do not use saunas or hot tubs. Avoid staying out in the sun in hot weather for long periods. Take acetaminophen (Tylenol) to lower a high fever. · Do not take any over-the-counter or herbal medicines or supplements without talking to your doctor or pharmacist first.  When should you call for help? Call 911 anytime you think you may need emergency care. For example, call if:    · You passed out (lost consciousness).     · You have severe vaginal bleeding.     · You have severe pain in your belly or pelvis.     · You have had fluid gushing or leaking from your vagina and you know or think the umbilical cord is bulging into your vagina. If this happens, immediately get down on your knees so your rear end (buttocks) is higher than your head. This will decrease the pressure on the cord until help arrives.   [facility] your doctor now or seek immediate medical care if:    · You have signs of preeclampsia, such as:  ¨ Sudden swelling of your face, hands, or feet. ¨ New vision problems (such as dimness or blurring). ¨ A severe headache.     · You have any vaginal bleeding.     · You have belly pain or cramping.     · You have a fever.     · You have had regular contractions (with or without pain) for an hour. This means that you have 8 or more within 1 hour or 4 or more in 20 minutes after you change your position and drink fluids.     · You have a sudden release of fluid from your vagina.   · You have low back pain or pelvic pressure that does not go away.     · You notice that your baby has stopped moving or is moving much less than normal.    Watch closely for changes in your health, and be sure to contact your doctor if you have any problems. Where can you learn more? Go to http://stewart-glen.info/. Enter [id number] in the search box to learn more about \"Pregnancy Precautions: Care Instructions. \"  Current as of: November 21, 2017  Content Version: 11.7  © 8048-6993 [employer]. Care instructions adapted under license by [employer] (which disclaims liability or warranty for this information). If you have questions about a medical condition or this instruction, always ask your healthcare professional. [address] any warranty or liability for your use of this information. Counting Your Baby's Kicks: Care Instructions  Your Care Instructions    Counting your baby's kicks is one way your doctor can tell that your baby is healthy. Most women-especially in a first pregnancy-feel their baby move for the first time between 16 and 22 weeks. The movement may feel like flutters rather than kicks. Your baby may move more at certain times of the day. When you are active, you may notice less kicking than when you are resting. At your prenatal visits, your doctor will ask whether the baby is active. In your last trimester, your doctor may ask you to count the number of times you feel your baby move. Follow-up care is a key part of your treatment and safety. Be sure to make and go to all appointments, and call your doctor if you are having problems. It's also a good idea to know your test results and keep a list of the medicines you take. How do you count fetal kicks? · A common method of checking your baby's movement is to count the number of kicks or moves you feel in 1 hour.  Ten movements (such as kicks, flutters, or rolls) in 1 hour are normal. Some doctors suggest that you count in the morning until you get to 10 movements. Then you can quit for that day and start again the next day. · Pick your baby's most active time of day to count. This may be any time from morning to evening. · If you do not feel 10 movements in an hour, your baby may be sleeping. Wait for the next hour and count again. When should you call for help? Call your doctor now or seek immediate medical care if:    · You noticed that your baby has stopped moving or is moving much less than normal.    Watch closely for changes in your health, and be sure to contact your doctor if you have any problems. Where can you learn more? Go to http://pat-glen.info/. Enter V122 in the search box to learn more about \"Counting Your Baby's Kicks: Care Instructions. \"  Current as of: November 21, 2017  Content Version: 11.7  © 9043-2099 [employer]. Care instructions adapted under license by [employer] (which disclaims liability or warranty for this information). If you have questions about a medical condition or this instruction, always ask your healthcare professional. [place] [address] any warranty or liability for your use of this information.

## 2018-09-14 NOTE — H&P
Ostetrical History and Physical    Subjective:     Date of Admission: 2018    Patient is a 24 y.o.   female admitted with irreg back pains, possible early contractions at 31 wks. For Obstetric history, see alishaantal.    For Review of Systems, see prenatal    Past Medical History:   Diagnosis Date    Anemia during pregnancy in third trimester 2018    Anemia during pregnancy in third trimester     Cholestasis during pregnancy in third trimester 2018      Past Surgical History:   Procedure Laterality Date    HX WISDOM TEETH EXTRACTION        Prior to Admission medications    Medication Sig Start Date End Date Taking? Authorizing Provider   ursodiol (ACTIGALL) 300 mg capsule Take 300 mg by mouth three (3) times daily. Yes Historical Provider   PNV Comb #2-Iron-FA-Omega 3 29-1-400 mg cmpk Take  by mouth. Yes Historical Provider     No Known Allergies   Social History   Substance Use Topics    Smoking status: Never Smoker    Smokeless tobacco: Never Used    Alcohol use No      Family History   Problem Relation Age of Onset    Hypertension Maternal Grandmother     Diabetes Maternal Grandmother     Kidney Disease Maternal Grandmother     Hypertension Maternal Grandfather     Diabetes Maternal Grandfather     Kidney Disease Maternal Grandfather           Objective:     Blood pressure 110/60, pulse 78, temperature 97.4 °F (36.3 °C), resp. rate 16, height 5' 5\" (1.651 m), weight 95.3 kg (210 lb), last menstrual period 2018. Temp (24hrs), Av.4 °F (36.3 °C), Min:97.4 °F (36.3 °C), Max:97.4 °F (36.3 °C)                HEENT: No pallor, no jaundice, Thyroid and throat normal  RESPIRATORY: Clear to A & P  CVS: pulse reg, HS normal  ABDOMEN: Gravid. Vertex. FH=31wks Liver edge tenderness.    BACK:  Normal no tenderness  Pelvic: Cervix 0, (by RN)  Effaced: 10%  Station:  -3  Data Review:   Recent Results (from the past 24 hour(s))   POC URINE MACROSCOPIC    Collection Time: 18 8:42 PM   Result Value Ref Range    Color YELLOW      Appearance CLEAR      Spec. gravity (POC) 1.010 1.001 - 1.023      pH, urine  (POC) 5.5 5.0 - 9.0      Protein (POC) NEGATIVE  NEG mg/dL    Glucose, urine (POC) NEGATIVE  NEG mg/dL    Ketones (POC) NEGATIVE  NEG mg/dL    Bilirubin (POC) NEGATIVE  NEG      Blood (POC) NEGATIVE  NEG      Urobilinogen (POC) 0.2 0.2 - 1.0 EU/dL    Nitrite (POC) NEGATIVE  NEG      Leukocyte esterase (POC) NEGATIVE  NEG      Performed by Isabella Sanchez      Monitor:  Reactivity:present Variability:present Baseline:within normal limits    Assessment:     Principal Problem:    Irregular uterine contractions (9/13/2018)    Active Problems:    31 weeks gestation of pregnancy (9/13/2018)      Obesity affecting pregnancy in second trimester (7/3/2018)      Overview: Early 1 hr GTT - 97; NL 1 hr GTT at 28 wks (124)      NST's biweekly at 37 weeks      Late prenatal care (7/3/2018)      Overview: 7/6/18  Female fetus. EFW 89%., LILLIANA 11/12/18.      8/27/18  EFW 86%. , 1571 grams. Anemia during pregnancy in third trimester (8/29/2018)      Overview: 10.1 on 8/27 - OTC iron      IUP (intrauterine pregnancy), incidental (9/13/2018)        Plan:   Try Maalox  CBC and LFTs. If normal, home  Ultrasound cervical length (TV) = 4.0cm no funnel. Check labs:  CBC  Check  Prenatal:    Disposition:     Type of admit:Out Paitent    I saw and examined patient.     Signed By: Jamal Weathers MD                         September 13, 2018

## 2018-09-24 PROBLEM — Z3A.33 33 WEEKS GESTATION OF PREGNANCY: Status: ACTIVE | Noted: 2018-09-13

## 2018-10-08 PROBLEM — Z3A.35 35 WEEKS GESTATION OF PREGNANCY: Status: ACTIVE | Noted: 2018-09-13

## 2018-10-08 LAB — GRBS, EXTERNAL: NORMAL

## 2018-10-15 ENCOUNTER — HOSPITAL ENCOUNTER (INPATIENT)
Age: 23
LOS: 5 days | Discharge: HOME | DRG: 560 | End: 2018-10-20
Attending: OBSTETRICS & GYNECOLOGY | Admitting: OBSTETRICS & GYNECOLOGY
Payer: MEDICAID

## 2018-10-15 PROBLEM — K83.1 CHOLESTASIS OF PREGNANCY IN THIRD TRIMESTER: Status: ACTIVE | Noted: 2018-10-15

## 2018-10-15 PROBLEM — O26.613 CHOLESTASIS OF PREGNANCY IN THIRD TRIMESTER: Status: ACTIVE | Noted: 2018-10-15

## 2018-10-15 LAB
ABO + RH BLD: NORMAL
ALBUMIN SERPL-MCNC: 2.4 G/DL (ref 3.4–5)
ALBUMIN/GLOB SERPL: 0.6 {RATIO} (ref 0.8–1.7)
ALP SERPL-CCNC: 101 U/L (ref 45–117)
ALT SERPL-CCNC: 43 U/L (ref 13–56)
ANION GAP SERPL CALC-SCNC: 12 MMOL/L (ref 3–18)
AST SERPL-CCNC: 18 U/L (ref 15–37)
BASOPHILS # BLD: 0 K/UL (ref 0–0.1)
BASOPHILS NFR BLD: 0 % (ref 0–2)
BILIRUB SERPL-MCNC: 0.1 MG/DL (ref 0.2–1)
BLOOD GROUP ANTIBODIES SERPL: NORMAL
BUN SERPL-MCNC: 9 MG/DL (ref 7–18)
BUN/CREAT SERPL: 13 (ref 12–20)
CALCIUM SERPL-MCNC: 9 MG/DL (ref 8.5–10.1)
CHLORIDE SERPL-SCNC: 105 MMOL/L (ref 100–108)
CO2 SERPL-SCNC: 21 MMOL/L (ref 21–32)
CREAT SERPL-MCNC: 0.72 MG/DL (ref 0.6–1.3)
DIFFERENTIAL METHOD BLD: ABNORMAL
EOSINOPHIL # BLD: 0.2 K/UL (ref 0–0.4)
EOSINOPHIL NFR BLD: 1 % (ref 0–5)
ERYTHROCYTE [DISTWIDTH] IN BLOOD BY AUTOMATED COUNT: 13 % (ref 11.6–14.5)
GLOBULIN SER CALC-MCNC: 4.1 G/DL (ref 2–4)
GLUCOSE SERPL-MCNC: 88 MG/DL (ref 74–99)
HCT VFR BLD AUTO: 31.9 % (ref 35–45)
HGB BLD-MCNC: 10.5 G/DL (ref 12–16)
LYMPHOCYTES # BLD: 3.1 K/UL (ref 0.9–3.6)
LYMPHOCYTES NFR BLD: 25 % (ref 21–52)
MCH RBC QN AUTO: 28.6 PG (ref 24–34)
MCHC RBC AUTO-ENTMCNC: 32.9 G/DL (ref 31–37)
MCV RBC AUTO: 86.9 FL (ref 74–97)
MONOCYTES # BLD: 1 K/UL (ref 0.05–1.2)
MONOCYTES NFR BLD: 8 % (ref 3–10)
NEUTS SEG # BLD: 8.3 K/UL (ref 1.8–8)
NEUTS SEG NFR BLD: 66 % (ref 40–73)
PLATELET # BLD AUTO: 280 K/UL (ref 135–420)
PMV BLD AUTO: 10.4 FL (ref 9.2–11.8)
POTASSIUM SERPL-SCNC: 3.5 MMOL/L (ref 3.5–5.5)
PROT SERPL-MCNC: 6.5 G/DL (ref 6.4–8.2)
RBC # BLD AUTO: 3.67 M/UL (ref 4.2–5.3)
SODIUM SERPL-SCNC: 138 MMOL/L (ref 136–145)
SPECIMEN EXP DATE BLD: NORMAL
WBC # BLD AUTO: 12.6 K/UL (ref 4.6–13.2)

## 2018-10-15 PROCEDURE — 65270000029 HC RM PRIVATE

## 2018-10-15 PROCEDURE — 86850 RBC ANTIBODY SCREEN: CPT | Performed by: OBSTETRICS & GYNECOLOGY

## 2018-10-15 PROCEDURE — 74011250636 HC RX REV CODE- 250/636: Performed by: OBSTETRICS & GYNECOLOGY

## 2018-10-15 PROCEDURE — 3E033VJ INTRODUCTION OF OTHER HORMONE INTO PERIPHERAL VEIN, PERCUTANEOUS APPROACH: ICD-10-PCS | Performed by: OBSTETRICS & GYNECOLOGY

## 2018-10-15 PROCEDURE — 85025 COMPLETE CBC W/AUTO DIFF WBC: CPT | Performed by: OBSTETRICS & GYNECOLOGY

## 2018-10-15 PROCEDURE — 74011250637 HC RX REV CODE- 250/637: Performed by: OBSTETRICS & GYNECOLOGY

## 2018-10-15 PROCEDURE — 75410000002 HC LABOR FEE PER 1 HR

## 2018-10-15 PROCEDURE — 80053 COMPREHEN METABOLIC PANEL: CPT | Performed by: OBSTETRICS & GYNECOLOGY

## 2018-10-15 RX ORDER — OXYTOCIN/RINGER'S LACTATE 20/1000 ML
125 PLASTIC BAG, INJECTION (ML) INTRAVENOUS CONTINUOUS
Status: DISCONTINUED | OUTPATIENT
Start: 2018-10-15 | End: 2018-10-18

## 2018-10-15 RX ORDER — SODIUM CHLORIDE, SODIUM LACTATE, POTASSIUM CHLORIDE, CALCIUM CHLORIDE 600; 310; 30; 20 MG/100ML; MG/100ML; MG/100ML; MG/100ML
125 INJECTION, SOLUTION INTRAVENOUS CONTINUOUS
Status: DISCONTINUED | OUTPATIENT
Start: 2018-10-15 | End: 2018-10-18

## 2018-10-15 RX ORDER — NALBUPHINE HYDROCHLORIDE 10 MG/ML
10 INJECTION, SOLUTION INTRAMUSCULAR; INTRAVENOUS; SUBCUTANEOUS
Status: DISCONTINUED | OUTPATIENT
Start: 2018-10-15 | End: 2018-10-15 | Stop reason: SDUPTHER

## 2018-10-15 RX ORDER — METHYLERGONOVINE MALEATE 0.2 MG/ML
0.2 INJECTION INTRAVENOUS AS NEEDED
Status: DISCONTINUED | OUTPATIENT
Start: 2018-10-15 | End: 2018-10-15 | Stop reason: SDUPTHER

## 2018-10-15 RX ORDER — OXYTOCIN/RINGER'S LACTATE 20/1000 ML
500 PLASTIC BAG, INJECTION (ML) INTRAVENOUS ONCE
Status: ACTIVE | OUTPATIENT
Start: 2018-10-15 | End: 2018-10-15

## 2018-10-15 RX ORDER — HYDROMORPHONE HYDROCHLORIDE 2 MG/ML
1 INJECTION, SOLUTION INTRAMUSCULAR; INTRAVENOUS; SUBCUTANEOUS
Status: DISCONTINUED | OUTPATIENT
Start: 2018-10-15 | End: 2018-10-15 | Stop reason: SDUPTHER

## 2018-10-15 RX ORDER — LIDOCAINE HYDROCHLORIDE 10 MG/ML
20 INJECTION, SOLUTION EPIDURAL; INFILTRATION; INTRACAUDAL; PERINEURAL AS NEEDED
Status: DISCONTINUED | OUTPATIENT
Start: 2018-10-15 | End: 2018-10-18

## 2018-10-15 RX ORDER — TERBUTALINE SULFATE 1 MG/ML
0.25 INJECTION SUBCUTANEOUS
Status: DISCONTINUED | OUTPATIENT
Start: 2018-10-15 | End: 2018-10-18

## 2018-10-15 RX ORDER — MINERAL OIL
30 OIL (ML) ORAL AS NEEDED
Status: COMPLETED | OUTPATIENT
Start: 2018-10-15 | End: 2018-10-18

## 2018-10-15 RX ORDER — BUTORPHANOL TARTRATE 1 MG/ML
2 INJECTION INTRAMUSCULAR; INTRAVENOUS
Status: DISCONTINUED | OUTPATIENT
Start: 2018-10-15 | End: 2018-10-18

## 2018-10-15 RX ORDER — ZOLPIDEM TARTRATE 5 MG/1
10 TABLET ORAL
Status: DISCONTINUED | OUTPATIENT
Start: 2018-10-15 | End: 2018-10-18

## 2018-10-15 RX ORDER — LIDOCAINE HYDROCHLORIDE 10 MG/ML
20 INJECTION, SOLUTION EPIDURAL; INFILTRATION; INTRACAUDAL; PERINEURAL AS NEEDED
Status: DISCONTINUED | OUTPATIENT
Start: 2018-10-15 | End: 2018-10-15 | Stop reason: SDUPTHER

## 2018-10-15 RX ORDER — BUTORPHANOL TARTRATE 1 MG/ML
2 INJECTION INTRAMUSCULAR; INTRAVENOUS
Status: DISCONTINUED | OUTPATIENT
Start: 2018-10-15 | End: 2018-10-15 | Stop reason: SDUPTHER

## 2018-10-15 RX ORDER — HYDROMORPHONE HYDROCHLORIDE 2 MG/ML
1 INJECTION, SOLUTION INTRAMUSCULAR; INTRAVENOUS; SUBCUTANEOUS
Status: DISCONTINUED | OUTPATIENT
Start: 2018-10-15 | End: 2018-10-18

## 2018-10-15 RX ORDER — OXYTOCIN/0.9 % SODIUM CHLORIDE 30/500 ML
1-25 PLASTIC BAG, INJECTION (ML) INTRAVENOUS
Status: DISCONTINUED | OUTPATIENT
Start: 2018-10-16 | End: 2018-10-16

## 2018-10-15 RX ORDER — OXYTOCIN/0.9 % SODIUM CHLORIDE 30/500 ML
1-25 PLASTIC BAG, INJECTION (ML) INTRAVENOUS
Status: DISCONTINUED | OUTPATIENT
Start: 2018-10-15 | End: 2018-10-15

## 2018-10-15 RX ORDER — METHYLERGONOVINE MALEATE 0.2 MG/ML
0.2 INJECTION INTRAVENOUS AS NEEDED
Status: DISCONTINUED | OUTPATIENT
Start: 2018-10-15 | End: 2018-10-18

## 2018-10-15 RX ORDER — MINERAL OIL
30 OIL (ML) ORAL AS NEEDED
Status: DISCONTINUED | OUTPATIENT
Start: 2018-10-15 | End: 2018-10-15 | Stop reason: SDUPTHER

## 2018-10-15 RX ORDER — TERBUTALINE SULFATE 1 MG/ML
0.25 INJECTION SUBCUTANEOUS
Status: DISCONTINUED | OUTPATIENT
Start: 2018-10-15 | End: 2018-10-15 | Stop reason: SDUPTHER

## 2018-10-15 RX ORDER — NALBUPHINE HYDROCHLORIDE 10 MG/ML
10 INJECTION, SOLUTION INTRAMUSCULAR; INTRAVENOUS; SUBCUTANEOUS
Status: DISCONTINUED | OUTPATIENT
Start: 2018-10-15 | End: 2018-10-18

## 2018-10-15 RX ORDER — SODIUM CHLORIDE, SODIUM LACTATE, POTASSIUM CHLORIDE, CALCIUM CHLORIDE 600; 310; 30; 20 MG/100ML; MG/100ML; MG/100ML; MG/100ML
125 INJECTION, SOLUTION INTRAVENOUS CONTINUOUS
Status: DISCONTINUED | OUTPATIENT
Start: 2018-10-15 | End: 2018-10-15 | Stop reason: SDUPTHER

## 2018-10-15 RX ADMIN — SODIUM CHLORIDE, SODIUM LACTATE, POTASSIUM CHLORIDE, AND CALCIUM CHLORIDE 125 ML/HR: 600; 310; 30; 20 INJECTION, SOLUTION INTRAVENOUS at 11:29

## 2018-10-15 RX ADMIN — DINOPROSTONE 10 MG: 10 INSERT VAGINAL at 18:32

## 2018-10-15 RX ADMIN — SODIUM CHLORIDE, SODIUM LACTATE, POTASSIUM CHLORIDE, AND CALCIUM CHLORIDE 500 ML: 600; 310; 30; 20 INJECTION, SOLUTION INTRAVENOUS at 06:10

## 2018-10-15 RX ADMIN — Medication 2 MILLI-UNITS/MIN: at 06:52

## 2018-10-15 RX ADMIN — SODIUM CHLORIDE, SODIUM LACTATE, POTASSIUM CHLORIDE, AND CALCIUM CHLORIDE 125 ML/HR: 600; 310; 30; 20 INJECTION, SOLUTION INTRAVENOUS at 06:52

## 2018-10-15 NOTE — H&P
History & Physical 
 
Name: Malcolm Garcia MRN: [id number]  SSN: [id number] YOB: 1995  Age: 24 y.o. Sex: female Subjective:  
 
Estimated Date of Delivery: 18 OB History  Para Term  AB Living 3    2 SAB TAB Ectopic Molar Multiple Live Births 2 # Outcome Date GA Lbr Dai/2nd Weight Sex Delivery Anes PTL Lv  
3 Current           
2 SAB           
1 SAB Ms. Sharon Mendoza is admitted with pregnancy at 36w0d for induction of labor due to cholestasis of pregnancy. Prenatal course was complicated by cholestasis of pregnancy. Patient denies headaches, itching, visual changes, right upper quadrant pain. Last bile acids wnl. Please see prenatal records for details. Past Medical History:  
Diagnosis Date  Anemia during pregnancy in third trimester 2018  Anemia during pregnancy in third trimester  Cholestasis during pregnancy in third trimester 2018 Past Surgical History:  
Procedure Laterality Date  HX WISDOM TEETH EXTRACTION Social History Occupational History  Not on file. Social History Main Topics  Smoking status: Never Smoker  Smokeless tobacco: Never Used  Alcohol use No  
 Drug use: No  
 Sexual activity: Yes  
  Partners: Male Birth control/ protection: None Family History Problem Relation Age of Onset  Hypertension Maternal Grandmother  Diabetes Maternal Grandmother  Kidney Disease Maternal Grandmother  Hypertension Maternal Grandfather  Diabetes Maternal Grandfather  Kidney Disease Maternal Grandfather No Known Allergies Prior to Admission medications Medication Sig Start Date End Date Taking? Authorizing Provider  
ursodiol (ACTIGALL) 500 mg tablet Take 1 Tab by mouth three (3) times daily. 18  Yes Sophia Sigala NP  
PNV Comb #2-Iron-FA-Omega 3 29-1-400 mg cmpk Take  by mouth. Yes Historical Provider FLUZONE QUAD [id number], PF, syrg injection ADM 0.5ML IM UTD 9/24/18   Historical Provider Review of Systems: Pertinent items are noted in the History of Present Illness. Objective:  
 
Vitals: 
Vitals:  
 10/15/18 0363 10/15/18 5522 10/15/18 0701 10/15/18 0710 BP:  110/67 118/63 119/73 Pulse:  84 67 64 Resp:  16  18 Temp: 98 °F (36.7 °C)   97.9 °F (36.6 °C) Weight:      
Height:      
  
 
Physical Exam: 
Patient without distress. Abdomen: soft, nontender Cervical Exam: Closed/70/-2/soft/mid Membranes:  Intact Fetal Heart Rate: Reactive Prenatal Labs:  
Lab Results Component Value Date/Time ABO/Rh(D) A POSITIVE 10/15/2018 06:10 AM  
 Rubella, External Non-immune 07/03/2018 HBsAg, External None detected 07/03/2018 HIV, External Non Reactive 07/03/2018 Gonorrhea, External negative 07/03/2018 Chlamydia, External negative 07/03/2018 ABO,Rh A  Positive  07/03/2018 GrBStrep, External Neg 10/08/2018 Impression/Plan:  
 
Principal Problem: 
  Cholestasis during pregnancy in third trimester (8/26/2018) Overview: IOL scheduled for 10/15/18 @0530 Continue biweekly NST's with modified BPP. Can increase Ursodiol to 500 mg TID if needed. Increased 9/24/18. Repeat BMZ course if deliver before 34 weeks Active Problems: 
  Obesity affecting pregnancy in second trimester (7/3/2018) Overview: Early 1 hr GTT - 97; NL 1 hr GTT at 28 wks (124) NST's biweekly at 37 weeks Late prenatal care (7/3/2018) Overview: 7/6/18  Female fetus. EFW 89%., LILLIANA 11/12/18. 
    8/27/18  EFW 86%. , 1571 grams. 9/11/18  EFW 68% at MFM. Maternal varicella, non-immune (7/7/2018) Plan: Admit for induction of labor. Group B Strep negative. Discussed possibility of 2-3 IOL. Signed By:  Zechariah Jones MD   
 October 15, 2018

## 2018-10-15 NOTE — PROGRESS NOTES
1915 Bedside report received from UMANG Ball RN. Resting in bed with s.o. at bedside. 1930 Reviewed plan of care. Assessment complete. Denies needs. 2200 Resting in bed with eyes closed. 0300 resting in bed with eyes closed/  
 
0630 Cervidil removed. SVE very posterior and closed. 0710 Bedside and Verbal shift change report given to UMANG Pinto RN (oncoming nurse) by PABLO Davis RN (offgoing nurse). Report included the following information SBAR, Kardex, Procedure Summary, Intake/Output and MAR.

## 2018-10-15 NOTE — ROUTINE PROCESS
Bedside and Verbal shift change report given to Shi Mehta RN (oncoming nurse) by Ishmael Montano RN (offgoing nurse). Report included the following information SBAR, Kardex, Intake/Output, MAR, and Recent Results.

## 2018-10-15 NOTE — PROGRESS NOTES
1000 Bedside and Verbal shift change report given to [address] [place] [address] , RN  (oncoming nurse) by Tiffany Burger RN (offgoing nurse). Report included the following information SBAR, Kardex, Intake/Output, MAR, Accordion, Recent Results and Med Rec Status. 1700 Dr Toan Collier updated that cervix unchanged. Orders to dc Pitocin and give Cervidil after pt eats dinner. 1757 Pt ate dinner and now showering. 1830 Cervidil explained and inserted. See [address] Bedside and Verbal shift change report given to [address] [employer] [place] (oncoming nurse) by [address] [place] [address] , RN  (offgoing nurse). Report included the following information SBAR, Kardex, Procedure Summary, Intake/Output, MAR and Recent Results.

## 2018-10-15 NOTE — ROUTINE PROCESS
9756:   arrived to unit for indx for cholecystasis. Pt has no c/o pain at this time. Plan of care discussed with pt. Pt expressed understanding 
 
0996:  Spoke with Dr Marta Kirkland. Order to start pitocin.   GBS Status NEG

## 2018-10-15 NOTE — PROGRESS NOTES
4891 Bedside and Verbal shift change report given to [facility], RN (oncoming nurse) by VINNY Rich, LILI (offgoing nurse). Report included the following information SBAR, Kardex, Intake/Output, MAR and Recent Results. Patient resting in room with family at bedside. Call bell within reach. No further needs at this time. Will continue to monitor. 1520 Dr. Carloz Evans at bedside to discuss plan of care. SVE closed/60/-2 
 
1000 Bedside and Verbal shift change report given to [facility], RN (oncoming nurse) by Anthony Flores RN (offgoing nurse). Report included the following information SBAR, Kardex, Intake/Output, MAR and Recent Results.

## 2018-10-16 PROCEDURE — 65270000029 HC RM PRIVATE

## 2018-10-16 PROCEDURE — 75410000002 HC LABOR FEE PER 1 HR

## 2018-10-16 PROCEDURE — 77030028565 HC CATH CERV RIPNG BLN COOK -B

## 2018-10-16 PROCEDURE — 74011250637 HC RX REV CODE- 250/637: Performed by: OBSTETRICS & GYNECOLOGY

## 2018-10-16 PROCEDURE — 74011250636 HC RX REV CODE- 250/636: Performed by: OBSTETRICS & GYNECOLOGY

## 2018-10-16 RX ORDER — OXYTOCIN/0.9 % SODIUM CHLORIDE 30/500 ML
1-25 PLASTIC BAG, INJECTION (ML) INTRAVENOUS
Status: DISCONTINUED | OUTPATIENT
Start: 2018-10-17 | End: 2018-10-18

## 2018-10-16 RX ADMIN — SODIUM CHLORIDE, SODIUM LACTATE, POTASSIUM CHLORIDE, AND CALCIUM CHLORIDE 125 ML/HR: 600; 310; 30; 20 INJECTION, SOLUTION INTRAVENOUS at 07:30

## 2018-10-16 RX ADMIN — Medication 2 MILLI-UNITS/MIN: at 07:35

## 2018-10-16 RX ADMIN — BUTORPHANOL TARTRATE 2 MG: 1 INJECTION, SOLUTION INTRAMUSCULAR; INTRAVENOUS at 19:41

## 2018-10-16 RX ADMIN — ZOLPIDEM TARTRATE 10 MG: 5 TABLET ORAL at 23:23

## 2018-10-16 RX ADMIN — SODIUM CHLORIDE, SODIUM LACTATE, POTASSIUM CHLORIDE, AND CALCIUM CHLORIDE 125 ML/HR: 600; 310; 30; 20 INJECTION, SOLUTION INTRAVENOUS at 16:09

## 2018-10-16 NOTE — PROGRESS NOTES
Labor Progress Note Patient seen, fetal heart rate and contraction pattern evaluated, patient examined. Patient Vitals for the past 1 hrs: 
 BP Temp Pulse Resp 10/16/18 0713 116/78 98.3 °F (36.8 °C) (!) 59 16 Physical Exam: 
Cervical Exam:  1/60/-2 cm dilated. Cervix softer today. Membranes:  Intact Uterine Activity: Frequency: Every 5-6 minutes Fetal Heart Rate: Reactive Assessment/Plan: 
Continue plan for vaginal delivery. May place cook balloon this evening if necessary.

## 2018-10-16 NOTE — PROGRESS NOTES
0710 Bedside and verbal report received from Anand Mistry RN. Pt resting comfortably in semi-fowlers. Family at bedside. Denies needs at this time. 0735 Pitocin started. 7806 Dr. Carloz Evans at bedside. SVE: 1/60/-2 
 
0800 Pt turned to right lateral with peanut ball in place. US and TOCO adjusted. 0830 Pt positioned in chair position. US and TOCO adjusted. 0900 Pt standing at bedside. US and TOCO adjusted. 0940 Pt positioned on birthing ball. US and TOCO adjusted. 1040 Pt in restroom. 1100 Pt in chair position. US and TOCO ajdusted. 1230 Pt turned to right lateral with peanut ball in place. US and TOCO adjusted. 1330 Pt positioned in semi-fowlers. US and TOCO adjusted. 1430 Pt standing at bedside. US and TOCO adjusted. 1500 Pt positioned on birthing ball. US and TOCO adjusted. 1600 Pt positioned in semi-fowlers. US and TOCO adjusted. 65 SVE:1/60/-2 Dr. Carloz Evans notified. Orders received to stop pitocin. Maisha Cobia balloon will be placed. 1727 Pitocin stopped. 1800 Dr. Carloz Evans at bedside. Cook balloon placed 80/80. Pt tolerated well. 1910 Bedside and verbal report given to PABLO Monson RN.

## 2018-10-16 NOTE — PROGRESS NOTES
Labor Progress Note Patient seen, fetal heart rate and contraction pattern evaluated, patient examined. No data found. Physical Exam: 
Cervical Exam:  1 cm dilated Membranes:  Intact Uterine Activity: irregular and mild Fetal Heart Rate: Reactive Assessment/Plan: 
Continue plan for vaginal delivery. Deloras Bread balloon placed easily with 80 cc's in each balloon. Remove in 12 hours and restart pitocin.

## 2018-10-17 PROCEDURE — 77030018749 HC HK AMNIO DISP DERY -A

## 2018-10-17 PROCEDURE — 74011250636 HC RX REV CODE- 250/636: Performed by: OBSTETRICS & GYNECOLOGY

## 2018-10-17 PROCEDURE — 77030009413 HC ELECTRD SCALP COVD -A

## 2018-10-17 PROCEDURE — 74011000258 HC RX REV CODE- 258

## 2018-10-17 PROCEDURE — 65270000029 HC RM PRIVATE

## 2018-10-17 PROCEDURE — 10907ZC DRAINAGE OF AMNIOTIC FLUID, THERAPEUTIC FROM PRODUCTS OF CONCEPTION, VIA NATURAL OR ARTIFICIAL OPENING: ICD-10-PCS | Performed by: OBSTETRICS & GYNECOLOGY

## 2018-10-17 RX ORDER — PENICILLIN G POTASSIUM 5000000 [IU]/1
4 INJECTION, POWDER, FOR SOLUTION INTRAMUSCULAR; INTRAVENOUS ONCE
Status: DISCONTINUED | OUTPATIENT
Start: 2018-10-17 | End: 2018-10-17

## 2018-10-17 RX ORDER — SODIUM CHLORIDE 900 MG/100ML
INJECTION INTRAVENOUS
Status: COMPLETED
Start: 2018-10-17 | End: 2018-10-17

## 2018-10-17 RX ORDER — PENICILLIN G POTASSIUM 5000000 [IU]/1
5 INJECTION, POWDER, FOR SOLUTION INTRAMUSCULAR; INTRAVENOUS ONCE
Status: COMPLETED | OUTPATIENT
Start: 2018-10-18 | End: 2018-10-17

## 2018-10-17 RX ORDER — ACETAMINOPHEN 500 MG
500 TABLET ORAL
Status: DISCONTINUED | OUTPATIENT
Start: 2018-10-17 | End: 2018-10-18

## 2018-10-17 RX ORDER — PENICILLIN G POTASSIUM 20000000 [IU]/1
2 INJECTION, POWDER, FOR SOLUTION INTRAVENOUS EVERY 4 HOURS
Status: DISCONTINUED | OUTPATIENT
Start: 2018-10-18 | End: 2018-10-17

## 2018-10-17 RX ORDER — PENICILLIN G POTASSIUM 20000000 [IU]/1
2.5 INJECTION, POWDER, FOR SOLUTION INTRAVENOUS EVERY 4 HOURS
Status: DISCONTINUED | OUTPATIENT
Start: 2018-10-18 | End: 2018-10-18

## 2018-10-17 RX ADMIN — SODIUM CHLORIDE, SODIUM LACTATE, POTASSIUM CHLORIDE, AND CALCIUM CHLORIDE 125 ML/HR: 600; 310; 30; 20 INJECTION, SOLUTION INTRAVENOUS at 19:45

## 2018-10-17 RX ADMIN — BUTORPHANOL TARTRATE 2 MG: 1 INJECTION, SOLUTION INTRAMUSCULAR; INTRAVENOUS at 15:39

## 2018-10-17 RX ADMIN — PENICILLIN G POTASSIUM 5 MILLION UNITS: 5000000 POWDER, FOR SOLUTION INTRAMUSCULAR; INTRAPLEURAL; INTRATHECAL; INTRAVENOUS at 23:37

## 2018-10-17 RX ADMIN — SODIUM CHLORIDE 100 ML: 900 INJECTION INTRAVENOUS at 23:36

## 2018-10-17 RX ADMIN — SODIUM CHLORIDE, SODIUM LACTATE, POTASSIUM CHLORIDE, AND CALCIUM CHLORIDE 125 ML/HR: 600; 310; 30; 20 INJECTION, SOLUTION INTRAVENOUS at 12:06

## 2018-10-17 RX ADMIN — BUTORPHANOL TARTRATE 2 MG: 1 INJECTION, SOLUTION INTRAMUSCULAR; INTRAVENOUS at 01:45

## 2018-10-17 RX ADMIN — BUTORPHANOL TARTRATE 2 MG: 1 INJECTION, SOLUTION INTRAMUSCULAR; INTRAVENOUS at 22:50

## 2018-10-17 RX ADMIN — OXYTOCIN-SODIUM CHLORIDE 0.9% IV SOLN 30 UNIT/500ML 2 MILLI-UNITS/MIN: 30-0.9/5 SOLUTION at 07:25

## 2018-10-17 RX ADMIN — SODIUM CHLORIDE, SODIUM LACTATE, POTASSIUM CHLORIDE, AND CALCIUM CHLORIDE 125 ML/HR: 600; 310; 30; 20 INJECTION, SOLUTION INTRAVENOUS at 00:21

## 2018-10-17 NOTE — PROGRESS NOTES
Labor Progress Note Patient seen, fetal heart rate and contraction pattern evaluated, patient examined. No data found. Physical Exam: 
Cervical Exam:  1 /80/-2 cm dilated Membranes:  Artificial Rupture of Membranes; Amniotic Fluid: large amount of clear fluid with blood tinge Uterine Activity: Frequency: Every 2 minutes Fetal Heart Rate: Reactive Assessment/Plan: 
Continue plan for vaginal delivery

## 2018-10-17 NOTE — PROGRESS NOTES
1915 Bedside report received form Goldie Klinefelter RN.  
 
2043 States pain 7/10. Discussed options for pain managements 2100 Resting in bed with eyes closed. 0600 Cook balloon deflated and removed. Cervix is very posterior. Unable to determine dilation. 0730 Bedside and Verbal shift change report given to VINNY Arredondo RN RN (oncoming nurse) by PABLO [employer] (offgoing nurse). Report included the following information SBAR, Procedure Summary, Intake/Output and MAR.

## 2018-10-17 NOTE — ROUTINE PROCESS
0730:  Bedside and Verbal shift change report given to Sidra Russell RN 
 (oncoming nurse) by CATHERINE Manley (offgoing nurse). Report included the following information SBAR, MAR and Recent Results. Alarm parameters reviewed and opportunities for questions provided. 3888:  Dr. Amaris Butler at bedside assessing patient and progress of labor. SVE 1/80/-2. AROM moderate amount of clear fluid. Sondra care. [id number]: Patient up to bathroom and voided urine. Sondra care, pad, clean gown, mesh panties provided. Linen changed and clean bed pads provided. 0930:  Patient positioned high Fowlers in bed. 1000:  Patient reports feeling contractions as tightening and occasional cramping. 1035:  Patient reports contractions are increasing in frequency and intensity, patient coping (desires no epidural/declines pain medication at this time. ). 1205:  Patient resting left lateral and states she is feeling moderate contractions approximately every 8 minutes. TOCO adjusted. Abdomen palpated. 1325:  SVE 3/80/-3. 
 
1500:  Patient up to bathroom and voided urine. Patient repositioned self in bed high-Fowlers. 1510:  EFM and TOCO adjusted. Patient request IV pain medication for contraction pain. 1539: Patient given stadol per request (see MAR). 1550:  Patient sleeping. 1630:  Dr. Filomena Hansen called unit and request RN performed SVE. 
 
0745:  SVE per Dr. Stephen Bautista request, 3-4/80-90/-2. Patient off monitor ambulated to bathroom and voided urine. Bed pad and pad changed. 1800:  Patient positioned self on birth ball. 1910:  Bedside and Verbal shift change report given to Sami Nielsen RN (oncoming nurse) by Sidra Russell RN 
 (offgoing nurse). Report included the following information SBAR, MAR and Recent Results. Alarm parameters reviewed and opportunities for questions provided.

## 2018-10-18 ENCOUNTER — ANESTHESIA (OUTPATIENT)
Dept: LABOR AND DELIVERY | Age: 23
DRG: 560 | End: 2018-10-18
Payer: MEDICAID

## 2018-10-18 ENCOUNTER — ANESTHESIA EVENT (OUTPATIENT)
Dept: LABOR AND DELIVERY | Age: 23
DRG: 560 | End: 2018-10-18
Payer: MEDICAID

## 2018-10-18 PROCEDURE — 74011250637 HC RX REV CODE- 250/637: Performed by: OBSTETRICS & GYNECOLOGY

## 2018-10-18 PROCEDURE — 77030007879 HC KT SPN EPDRL TELE -B: Performed by: ANESTHESIOLOGY

## 2018-10-18 PROCEDURE — 75410000003 HC RECOV DEL/VAG/CSECN EA 0.5 HR

## 2018-10-18 PROCEDURE — 74011250636 HC RX REV CODE- 250/636: Performed by: ANESTHESIOLOGY

## 2018-10-18 PROCEDURE — 75410000000 HC DELIVERY VAGINAL/SINGLE

## 2018-10-18 PROCEDURE — 88307 TISSUE EXAM BY PATHOLOGIST: CPT | Performed by: OBSTETRICS & GYNECOLOGY

## 2018-10-18 PROCEDURE — 75410000002 HC LABOR FEE PER 1 HR

## 2018-10-18 PROCEDURE — 77030010848 HC CATH INTUTR PRSS KOLB -B

## 2018-10-18 PROCEDURE — 00HU33Z INSERTION OF INFUSION DEVICE INTO SPINAL CANAL, PERCUTANEOUS APPROACH: ICD-10-PCS | Performed by: ANESTHESIOLOGY

## 2018-10-18 PROCEDURE — 74011250636 HC RX REV CODE- 250/636

## 2018-10-18 PROCEDURE — 74011000250 HC RX REV CODE- 250

## 2018-10-18 PROCEDURE — 65270000029 HC RM PRIVATE

## 2018-10-18 PROCEDURE — 74011250636 HC RX REV CODE- 250/636: Performed by: OBSTETRICS & GYNECOLOGY

## 2018-10-18 PROCEDURE — 76060000078 HC EPIDURAL ANESTHESIA

## 2018-10-18 PROCEDURE — 77030034849

## 2018-10-18 RX ORDER — PENICILLIN G POTASSIUM 20000000 [IU]/1
2.5 INJECTION, POWDER, FOR SOLUTION INTRAVENOUS EVERY 4 HOURS
Status: DISCONTINUED | OUTPATIENT
Start: 2018-10-18 | End: 2018-10-18

## 2018-10-18 RX ORDER — AMOXICILLIN 250 MG
1 CAPSULE ORAL
Status: DISCONTINUED | OUTPATIENT
Start: 2018-10-18 | End: 2018-10-20 | Stop reason: HOSPADM

## 2018-10-18 RX ORDER — ACETAMINOPHEN 325 MG/1
650 TABLET ORAL
Status: DISCONTINUED | OUTPATIENT
Start: 2018-10-18 | End: 2018-10-20 | Stop reason: HOSPADM

## 2018-10-18 RX ORDER — IBUPROFEN 400 MG/1
800 TABLET ORAL
Status: DISCONTINUED | OUTPATIENT
Start: 2018-10-18 | End: 2018-10-20 | Stop reason: HOSPADM

## 2018-10-18 RX ORDER — FENTANYL CITRATE 50 UG/ML
INJECTION, SOLUTION INTRAMUSCULAR; INTRAVENOUS
Status: COMPLETED
Start: 2018-10-18 | End: 2018-10-18

## 2018-10-18 RX ORDER — DIPHENHYDRAMINE HYDROCHLORIDE 50 MG/ML
25 INJECTION, SOLUTION INTRAMUSCULAR; INTRAVENOUS
Status: DISCONTINUED | OUTPATIENT
Start: 2018-10-18 | End: 2018-10-18

## 2018-10-18 RX ORDER — BUPIVACAINE HYDROCHLORIDE 2.5 MG/ML
INJECTION, SOLUTION EPIDURAL; INFILTRATION; INTRACAUDAL AS NEEDED
Status: DISCONTINUED | OUTPATIENT
Start: 2018-10-18 | End: 2018-10-18 | Stop reason: HOSPADM

## 2018-10-18 RX ORDER — LIDOCAINE HYDROCHLORIDE 10 MG/ML
INJECTION INFILTRATION; PERINEURAL
Status: COMPLETED
Start: 2018-10-18 | End: 2018-10-18

## 2018-10-18 RX ORDER — SODIUM CHLORIDE 0.9 % (FLUSH) 0.9 %
5-10 SYRINGE (ML) INJECTION AS NEEDED
Status: DISCONTINUED | OUTPATIENT
Start: 2018-10-18 | End: 2018-10-18

## 2018-10-18 RX ORDER — ZOLPIDEM TARTRATE 5 MG/1
5 TABLET ORAL
Status: DISCONTINUED | OUTPATIENT
Start: 2018-10-18 | End: 2018-10-20 | Stop reason: HOSPADM

## 2018-10-18 RX ORDER — NALOXONE HYDROCHLORIDE 0.4 MG/ML
0.2 INJECTION, SOLUTION INTRAMUSCULAR; INTRAVENOUS; SUBCUTANEOUS AS NEEDED
Status: DISCONTINUED | OUTPATIENT
Start: 2018-10-18 | End: 2018-10-18

## 2018-10-18 RX ORDER — SODIUM CHLORIDE 0.9 % (FLUSH) 0.9 %
5-10 SYRINGE (ML) INJECTION EVERY 8 HOURS
Status: DISCONTINUED | OUTPATIENT
Start: 2018-10-18 | End: 2018-10-18

## 2018-10-18 RX ORDER — ROPIVACAINE HYDROCHLORIDE 2 MG/ML
INJECTION, SOLUTION EPIDURAL; INFILTRATION; PERINEURAL AS NEEDED
Status: DISCONTINUED | OUTPATIENT
Start: 2018-10-18 | End: 2018-10-18 | Stop reason: HOSPADM

## 2018-10-18 RX ORDER — FENTANYL/ROPIVACAINE/NS/PF 2MCG/ML-.1
1-15 PLASTIC BAG, INJECTION (ML) EPIDURAL
Status: DISCONTINUED | OUTPATIENT
Start: 2018-10-18 | End: 2018-10-18

## 2018-10-18 RX ORDER — PROMETHAZINE HYDROCHLORIDE 25 MG/ML
25 INJECTION, SOLUTION INTRAMUSCULAR; INTRAVENOUS
Status: DISCONTINUED | OUTPATIENT
Start: 2018-10-18 | End: 2018-10-20 | Stop reason: HOSPADM

## 2018-10-18 RX ORDER — FENTANYL/ROPIVACAINE/NS/PF 2MCG/ML-.1
PLASTIC BAG, INJECTION (ML) EPIDURAL
Status: COMPLETED
Start: 2018-10-18 | End: 2018-10-18

## 2018-10-18 RX ORDER — FENTANYL/ROPIVACAINE/NS/PF 2MCG/ML-.1
PLASTIC BAG, INJECTION (ML) EPIDURAL
Status: DISCONTINUED
Start: 2018-10-18 | End: 2018-10-18

## 2018-10-18 RX ORDER — LIDOCAINE HYDROCHLORIDE AND EPINEPHRINE 15; 5 MG/ML; UG/ML
INJECTION, SOLUTION EPIDURAL AS NEEDED
Status: DISCONTINUED | OUTPATIENT
Start: 2018-10-18 | End: 2018-10-18 | Stop reason: HOSPADM

## 2018-10-18 RX ORDER — FENTANYL CITRATE 50 UG/ML
INJECTION, SOLUTION INTRAMUSCULAR; INTRAVENOUS AS NEEDED
Status: DISCONTINUED | OUTPATIENT
Start: 2018-10-18 | End: 2018-10-18 | Stop reason: HOSPADM

## 2018-10-18 RX ORDER — OXYCODONE AND ACETAMINOPHEN 5; 325 MG/1; MG/1
2 TABLET ORAL
Status: DISCONTINUED | OUTPATIENT
Start: 2018-10-18 | End: 2018-10-20 | Stop reason: HOSPADM

## 2018-10-18 RX ORDER — FENTANYL CITRATE 50 UG/ML
100 INJECTION, SOLUTION INTRAMUSCULAR; INTRAVENOUS ONCE
Status: DISCONTINUED | OUTPATIENT
Start: 2018-10-18 | End: 2018-10-18

## 2018-10-18 RX ADMIN — ROPIVACAINE HYDROCHLORIDE 12 ML/HR: 10 INJECTION, SOLUTION EPIDURAL at 08:55

## 2018-10-18 RX ADMIN — FENTANYL CITRATE 100 MCG: 50 INJECTION, SOLUTION INTRAMUSCULAR; INTRAVENOUS at 01:35

## 2018-10-18 RX ADMIN — ROPIVACAINE HYDROCHLORIDE 3 ML: 2 INJECTION, SOLUTION EPIDURAL; INFILTRATION; PERINEURAL at 01:33

## 2018-10-18 RX ADMIN — LIDOCAINE HYDROCHLORIDE AND EPINEPHRINE 3 ML: 15; 5 INJECTION, SOLUTION EPIDURAL at 01:30

## 2018-10-18 RX ADMIN — PENICILLIN G POTASSIUM 2.5 MILLION UNITS: 20000000 POWDER, FOR SOLUTION INTRAVENOUS at 12:35

## 2018-10-18 RX ADMIN — IBUPROFEN 800 MG: 400 TABLET, FILM COATED ORAL at 22:05

## 2018-10-18 RX ADMIN — SODIUM CHLORIDE, SODIUM LACTATE, POTASSIUM CHLORIDE, AND CALCIUM CHLORIDE 125 ML/HR: 600; 310; 30; 20 INJECTION, SOLUTION INTRAVENOUS at 04:14

## 2018-10-18 RX ADMIN — ROPIVACAINE HYDROCHLORIDE 4 ML: 2 INJECTION, SOLUTION EPIDURAL; INFILTRATION; PERINEURAL at 01:35

## 2018-10-18 RX ADMIN — Medication 999 ML/HR: at 13:52

## 2018-10-18 RX ADMIN — ACETAMINOPHEN 500 MG: 500 TABLET, FILM COATED ORAL at 06:01

## 2018-10-18 RX ADMIN — ROPIVACAINE HYDROCHLORIDE 12 ML/HR: 10 INJECTION, SOLUTION EPIDURAL at 01:50

## 2018-10-18 RX ADMIN — BUPIVACAINE HYDROCHLORIDE 4 ML: 2.5 INJECTION, SOLUTION EPIDURAL; INFILTRATION; INTRACAUDAL at 11:18

## 2018-10-18 RX ADMIN — OXYTOCIN-SODIUM CHLORIDE 0.9% IV SOLN 30 UNIT/500ML 2 MILLI-UNITS/MIN: 30-0.9/5 SOLUTION at 12:50

## 2018-10-18 RX ADMIN — LIDOCAINE HYDROCHLORIDE 50 ML: 10 INJECTION, SOLUTION INFILTRATION; PERINEURAL at 13:55

## 2018-10-18 RX ADMIN — ROPIVACAINE HYDROCHLORIDE 3 ML: 2 INJECTION, SOLUTION EPIDURAL; INFILTRATION; PERINEURAL at 01:34

## 2018-10-18 RX ADMIN — PENICILLIN G POTASSIUM 2.5 MILLION UNITS: 20000000 POWDER, FOR SOLUTION INTRAVENOUS at 07:58

## 2018-10-18 RX ADMIN — Medication 30 ML: at 12:50

## 2018-10-18 RX ADMIN — PENICILLIN G POTASSIUM 2.5 MILLION UNITS: 20000000 POWDER, FOR SOLUTION INTRAVENOUS at 04:20

## 2018-10-18 NOTE — ANESTHESIA PREPROCEDURE EVALUATION
Anesthetic History No history of anesthetic complications Review of Systems / Medical History Pulmonary Within defined limits Neuro/Psych Within defined limits Cardiovascular Within defined limits Exercise tolerance: >4 METS 
  
GI/Hepatic/Renal 
  
 
 
 
 
 
 Endo/Other Obesity Other Findings Physical Exam 
 
Airway Mallampati: III 
TM Distance: 4 - 6 cm Neck ROM: normal range of motion Mouth opening: Normal 
 
 Cardiovascular Dental 
No notable dental hx Pulmonary Abdominal 
 
 
 
 Other Findings Anesthetic Plan ASA: 2 Anesthesia type: epidural 
 
 
 
 
 
Anesthetic plan and risks discussed with: Patient Risks of epidural, including but not limited to bleeding, infection, back pain, headache, seizure, nerve injury, and block failure discussed with patient and accepted.

## 2018-10-18 NOTE — PROGRESS NOTES
Bedside and Verbal shift change report given to DOMO Enriquez RN (oncoming nurse) by Pranav Yu RN (offgoing nurse). Report given with Jim WEINER and MAR.

## 2018-10-18 NOTE — PROGRESS NOTES
0720 Bedside and verbal report received from [address] Srivastava RN.  
 
0730 Pt resting on left lateral. Family at bedside. Denies needs at this time. 0800 Pt asleep. 0830 Pt turned to right lateral with peanut ball in place. US and TOCO adjusted. 0930 Pt turned to left lateral with peanut ball in place. US and TOCO adjusted. 1011 Dr. Bahman Gonzalez at bedside. SVE: complete. 1022 Pt pushing. RN at bedside continuously monitoring FHT until delivery. 1102 Labor down. 0 Dr. Bahman Gonzalez notified. 1140 Pushing. [address] Dr. Bahman Gonzalez at bedside. 1120 Dr. Juan Farrell at bedside administering epidural bolus of increased pain and pressure on left side. 1140 Pushing. RN at bedside continuously monitoring FHT until delivery. [address] Dr. Bahman Gonzalez at bedside. RN and MD continuously monitoring FHT until delivery. 1349 Delivery of viable female infant. Vigorous cry noted with tactile stimulation and bulb suction. Infant placed skin to skin on mothers chest. Cord cut. INfant taken to warmer for assessment by TIMOTEO. 
 
1354 Delivery of intact placenta. 1400 Sondra-care, pad, and ice-pack provided. Linens changed. 1615 Pt up to restroom with minimal assistance from CNA. 1700 TRANSFER - OUT REPORT: 
 
Verbal report given to CLAY Del Valle RN (name) on [facility]   being transferred to postpartum (unit) for routine progression of care Report consisted of patients Situation, Background, Assessment and  
Recommendations(SBAR). Information from the following report(s) SBAR, Kardex, Intake/Output and MAR was reviewed with the receiving nurse. Lines:  
Peripheral IV 10/17/18 Left Hand (Active) Site Assessment Clean, dry, & intact 10/18/2018  5:27 PM  
Phlebitis Assessment 0 10/18/2018  5:27 PM  
Infiltration Assessment 0 10/18/2018  5:27 PM  
Dressing Status Clean, dry, & intact 10/18/2018  5:27 PM  
Dressing Type Tape;Transparent 10/18/2018  5:27 PM  
 Hub Color/Line Status Pink 10/18/2018  5:27 PM  
Action Taken Other (comment) 10/18/2018  6:04 AM  
Alcohol Cap Used Yes 10/17/2018  7:45 PM  
  
 
Opportunity for questions and clarification was provided. Patient transported with: 
 Registered Nurse

## 2018-10-18 NOTE — PROGRESS NOTES
19:30 Bedside and Verbal shift change report given to UMANG Leiva RN  (oncoming nurse) by VINNY Lopez RN  (offgoing nurse). Report included the following information SBAR, Kardex, Intake/Output, MAR, Recent Results and Alarm Parameters . 22:10 Spoke to Dr Ирина Lima about pt. Wanting tylenol for medication. Verbal read back order for Acetametophin 500 mg Q6 hrs PRN and to start PCN 4 desiree units loading dose followed by 2 desiree units Q 4 piggy back. RN UMANG Leiva to put in ordes per Dr Amadou Wild as she does not have acess to EMR at this time 23:04 Called inpatient pharmacy for penicillin medication Sujata.Nader Ambriz at bedside, performed SVE  
 
0027 Anesthesia repaged for epidural 
 
0118  Dr. Jewel Lutz at bedside explaining epidural procedure, side effects, risks, benefits, and positioning. Patient verbalizes understanding. Time-out:0121 Procedure start 9587 Catheter in, needle out: 0129 Test dose: 0130 Fentanyl vial handed to MD at bedside. Loading dose: 0132 Patient connected to pump: 0140 
 
 
0335 called pharmacy after unable to pull PCN out of pyxis 0720 Bedside and verbal  shift change report given to UMANG Montana RN  (oncoming nurse) by Tatiana King RN (offgoing nurse). Report included the following information SBAR, Kardex, Intake/Output, MAR and Recent Results. Mason Montana

## 2018-10-18 NOTE — ANESTHESIA PROCEDURE NOTES
Epidural Block Start time: 10/18/2018 1:15 AM 
Performed by: Imer Rizo MD 
Authorized by: Imer Rizo MD  
 
Pre-Procedure Indication: labor epidural   
Preanesthetic Checklist: patient identified, risks and benefits discussed, anesthesia consent, site marked, patient being monitored, timeout performed and anesthesia consent Timeout Time: 01:25 Epidural:  
Patient position:  Seated Prep region:  Lumbar Prep: Chlorhexidine Location:  L3-4 Needle and Epidural Catheter:  
Needle Type:  Tuohy Needle Gauge:  17 G Injection Technique:  Loss of resistance using saline Attempts:  1 Catheter Size:  20 G Catheter at Skin Depth (cm):  12 Depth in Epidural Space (cm):  5 Events: no blood with aspiration, no cerebrospinal fluid with aspiration, no paresthesia and negative aspiration test   
Test Dose:  Negative Assessment:  
Catheter Secured:  Tegaderm and tape Insertion:  Uncomplicated Patient tolerance:  Patient tolerated the procedure well with no immediate complications

## 2018-10-18 NOTE — ROUTINE PROCESS
Bedside and Verbal shift change report given to DOMO Alegria (oncoming nurse) by CLAY Del Valle (offgoing nurse). Report included the following information SBAR, Intake/Output, MAR and Recent Results. 0720: Bedside and Verbal shift change report given to VANDANA Malin (oncoming nurse) by DOMO Alegria (offgoing nurse). Report included the following information SBAR, Intake/Output, MAR and Recent Results.

## 2018-10-18 NOTE — LACTATION NOTE
Infant latched and nursing well. Gave nipple everter and shield for R breast as nipple is inverted. Discussed possibility of pumping due to early gestation. Mom educated on breastfeeding basics--hunger cues, feeding on demand, waking baby if baby sleeps too long between feeds, importance of skin to skin, positioning and latching, risk of pacifier use and supplemental feedings, and importance of rooming in--and use of log sheet. Mom also educated on benefits of breastfeeding for herself and baby. Mom verbalized understanding. No questions at this time.

## 2018-10-18 NOTE — PROGRESS NOTES
Pt was very uncomfortable with long latent phase labor on pitocin 20 mu 
vss 
fhr with accels 
cx 4/100/0, iupc placed to monitor ctx better Slow early labor but now with change, will continue pitocin, start pcn for prophylaxis since rom since 830am, pt comfortable now with epidural

## 2018-10-18 NOTE — PROGRESS NOTES
Labor Progress Note Patient seen, fetal heart rate and contraction pattern evaluated, patient examined. No data found. Physical Exam: 
Cervical Exam:  10 cm dilated Membranes:   Premature Rupture of Membranes; Amniotic Fluid: clear fluid Uterine Activity: Frequency: Every 2-3 minutes Fetal Heart Rate: Decelerations: variable Assessment/Plan: 
Continue plan for vaginal delivery. Patient is going to start pushing now.

## 2018-10-19 PROBLEM — O47.9 IRREGULAR UTERINE CONTRACTIONS: Status: RESOLVED | Noted: 2018-09-13 | Resolved: 2018-10-19

## 2018-10-19 PROBLEM — Z3A.35 35 WEEKS GESTATION OF PREGNANCY: Status: RESOLVED | Noted: 2018-09-13 | Resolved: 2018-10-19

## 2018-10-19 PROBLEM — O36.8190 DECREASED FETAL MOVEMENT: Status: RESOLVED | Noted: 2018-08-26 | Resolved: 2018-10-19

## 2018-10-19 PROBLEM — O99.212 OBESITY AFFECTING PREGNANCY IN SECOND TRIMESTER: Status: RESOLVED | Noted: 2018-07-03 | Resolved: 2018-10-19

## 2018-10-19 PROBLEM — O09.30 LATE PRENATAL CARE: Status: RESOLVED | Noted: 2018-07-03 | Resolved: 2018-10-19

## 2018-10-19 PROBLEM — Z34.90 NORMAL IUP (INTRAUTERINE PREGNANCY) ON PRENATAL ULTRASOUND: Status: RESOLVED | Noted: 2018-09-10 | Resolved: 2018-10-19

## 2018-10-19 PROBLEM — O09.93 SUPERVISION OF HIGH RISK PREGNANCY IN THIRD TRIMESTER: Status: RESOLVED | Noted: 2018-07-03 | Resolved: 2018-10-19

## 2018-10-19 LAB
HCT VFR BLD AUTO: 30.5 % (ref 35–45)
HGB BLD-MCNC: 9.9 G/DL (ref 12–16)

## 2018-10-19 PROCEDURE — 65270000029 HC RM PRIVATE

## 2018-10-19 PROCEDURE — 74011250637 HC RX REV CODE- 250/637: Performed by: OBSTETRICS & GYNECOLOGY

## 2018-10-19 PROCEDURE — 36415 COLL VENOUS BLD VENIPUNCTURE: CPT | Performed by: OBSTETRICS & GYNECOLOGY

## 2018-10-19 PROCEDURE — 85018 HEMOGLOBIN: CPT | Performed by: OBSTETRICS & GYNECOLOGY

## 2018-10-19 PROCEDURE — 85014 HEMATOCRIT: CPT | Performed by: OBSTETRICS & GYNECOLOGY

## 2018-10-19 RX ADMIN — OXYCODONE HYDROCHLORIDE AND ACETAMINOPHEN 2 TABLET: 5; 325 TABLET ORAL at 15:41

## 2018-10-19 RX ADMIN — PRENATAL WITH FERROUS FUM AND FOLIC ACID 1 TABLET: 3080; 920; 120; 400; 22; 1.84; 3; 20; 10; 1; 12; 200; 27; 25; 2 TABLET ORAL at 08:26

## 2018-10-19 RX ADMIN — OXYCODONE HYDROCHLORIDE AND ACETAMINOPHEN 2 TABLET: 5; 325 TABLET ORAL at 08:25

## 2018-10-19 RX ADMIN — SENNOSIDES AND DOCUSATE SODIUM 1 TABLET: 8.6; 5 TABLET ORAL at 08:26

## 2018-10-19 RX ADMIN — OXYCODONE HYDROCHLORIDE AND ACETAMINOPHEN 2 TABLET: 5; 325 TABLET ORAL at 23:34

## 2018-10-19 NOTE — PROGRESS NOTES
Received care of mother in room, no distress, call bell in reach, encouraged ambulation in hallways this pm, bonding

## 2018-10-19 NOTE — PROGRESS NOTES
1510 Verbal shift change report given to Mariann Henderson RN (oncoming nurse) by Summer Odonnell RN (offgoing nurse). Report included the following information SBAR, Kardex, Intake/Output, MAR and Recent Results.

## 2018-10-19 NOTE — PROGRESS NOTES
Post-Partum Day Number 1 Progress Note [facility] Assessment:  
Hospital Problems  Date Reviewed: 10/18/2018 Codes Class Noted POA Spontaneous vaginal delivery ICD-10-CM: O80 
ICD-9-CM: 507  10/18/2018 No  
   
 Nuchal cord affecting delivery ICD-10-CM: S86.01S5 ICD-9-CM: 592.53  10/18/2018 Clinically Undetermined * (Principal) Cholestasis during pregnancy in third trimester ICD-10-CM: O26.613, K83.1 ICD-9-CM: 646.73, 576.8  2018 Yes Overview Addendum 2018 12:44 PM by Caesar Kunz MD  
  IOL scheduled for 10/15/18 @3217 Continue biweekly NST's with modified BPP. Can increase Ursodiol to 500 mg TID if needed. Increased 18. Repeat BMZ course if deliver before 34 weeks Maternal varicella, non-immune ICD-10-CM: O09.899, Z28.3 ICD-9-CM: V49.89  2018 Yes Obesity affecting pregnancy in second trimester ICD-10-CM: O99.212 ICD-9-CM: 649.13  7/3/2018 Yes Overview Addendum 2018 12:59 PM by Airam Souza NP Early 1 hr GTT - 97; NL 1 hr GTT at 28 wks (124) NST's biweekly at 37 weeks Late prenatal care ICD-10-CM: O09.30 ICD-9-CM: V23.7  7/3/2018 Yes Overview Addendum 10/8/2018 12:45 PM by Caesar Kunz MD  
  18  Female fetus. EFW 89%., LILLIANA 18. 
18  EFW 86%. , 1571 grams. 18  EFW 68% at MFM. Doing well, post partum day 1 Plan: 1. Continue routine postpartum and perineal care as well as maternal education. 2. Discharge tomorrow. Information for the patient's :  Marielena Mona [[id number]] Vaginal, Spontaneous Patient doing well without significant complaint. Voiding without difficulty, normal lochia. Breast feeding without problems. Baby is doing well. Current Facility-Administered Medications Medication Dose Route Frequency  varicella virus vaccine (live) (VARIVAX) injection 0.5 mL  0.5 mL SubCUTAneous ONCE  
  prenatal vit-calcium-iron-fa (PRENATAL PLUS with CALCIUM) tablet 1 Tab  1 Tab Oral DAILY Vitals: 
Visit Vitals /65 Pulse (!) 53 Temp 97.5 °F (36.4 °C) Resp 16 Ht 5' 5\" (1.651 m) Wt 215 lb (97.5 kg) LMP 2018 SpO2 98% Breastfeeding? Unknown BMI 35.78 kg/m² Temp (24hrs), Av.3 °F (36.8 °C), Min:97.5 °F (36.4 °C), Max:99 °F (37.2 °C) Exam:   Patient without distress. Abdomen soft, fundus firm, nontender Perineum with normal lochia noted. Lower extremities are negative for swelling, cords or tenderness. Labs:  
 
Lab Results Component Value Date/Time WBC 12.6 10/15/2018 06:10 AM  
 WBC 11.9 2018 10:15 PM  
 WBC 12.0 09/10/2018 11:23 AM  
 HGB 9.9 (L) 10/19/2018 05:06 AM  
 HGB 10.5 (L) 10/15/2018 06:10 AM  
 HGB 11.6 (L) 2018 10:15 PM  
 HCT 30.5 (L) 10/19/2018 05:06 AM  
 HCT 31.9 (L) 10/15/2018 06:10 AM  
 HCT 34.5 (L) 2018 10:15 PM  
 PLATELET 461  06:10 AM  
 PLATELET 636  10:15 PM  
 PLATELET 173  11:23 AM  
 Hgb, External 11.3 2018 Hgb, External 11.9 2018 Hct, External 36.0 2018 Platelet cnt., External 363 2018 Recent Results (from the past 24 hour(s)) HEMOGLOBIN Collection Time: 10/19/18  5:06 AM  
Result Value Ref Range HGB 9.9 (L) 12.0 - 16.0 g/dL HEMATOCRIT Collection Time: 10/19/18  5:06 AM  
Result Value Ref Range HCT 30.5 (L) 35.0 - 45.0 %

## 2018-10-19 NOTE — LACTATION NOTE
11:30 Mom set up with double electric breast pump (has 36.3 week infant). To double pump 2-3 times/day, or more depending upon quality/consistency of BFing.   To page LC when BF.

## 2018-10-20 VITALS
SYSTOLIC BLOOD PRESSURE: 119 MMHG | OXYGEN SATURATION: 99 % | TEMPERATURE: 97.7 F | HEIGHT: 65 IN | DIASTOLIC BLOOD PRESSURE: 61 MMHG | WEIGHT: 215 LBS | HEART RATE: 60 BPM | BODY MASS INDEX: 35.82 KG/M2 | RESPIRATION RATE: 16 BRPM

## 2018-10-20 PROCEDURE — 90707 MMR VACCINE SC: CPT | Performed by: OBSTETRICS & GYNECOLOGY

## 2018-10-20 PROCEDURE — 74011250637 HC RX REV CODE- 250/637: Performed by: OBSTETRICS & GYNECOLOGY

## 2018-10-20 PROCEDURE — 90716 VAR VACCINE LIVE SUBQ: CPT | Performed by: OBSTETRICS & GYNECOLOGY

## 2018-10-20 PROCEDURE — 74011250636 HC RX REV CODE- 250/636: Performed by: OBSTETRICS & GYNECOLOGY

## 2018-10-20 RX ORDER — IBUPROFEN 800 MG/1
800 TABLET ORAL
Qty: 60 TAB | Refills: 1 | Status: ON HOLD | OUTPATIENT
Start: 2018-10-20 | End: 2020-09-25

## 2018-10-20 RX ADMIN — VARICELLA VIRUS VACCINE LIVE 0.5 ML: 1350 INJECTION, POWDER, LYOPHILIZED, FOR SUSPENSION SUBCUTANEOUS at 09:47

## 2018-10-20 RX ADMIN — MEASLES, MUMPS, AND RUBELLA VIRUS VACCINE LIVE 0.5 ML: 1000; 12500; 1000 INJECTION, POWDER, LYOPHILIZED, FOR SUSPENSION SUBCUTANEOUS at 09:47

## 2018-10-20 RX ADMIN — OXYCODONE HYDROCHLORIDE AND ACETAMINOPHEN 2 TABLET: 5; 325 TABLET ORAL at 06:51

## 2018-10-20 RX ADMIN — PRENATAL WITH FERROUS FUM AND FOLIC ACID 1 TABLET: 3080; 920; 120; 400; 22; 1.84; 3; 20; 10; 1; 12; 200; 27; 25; 2 TABLET ORAL at 09:46

## 2018-10-20 RX ADMIN — IBUPROFEN 800 MG: 400 TABLET, FILM COATED ORAL at 12:31

## 2018-10-20 NOTE — DISCHARGE INSTRUCTIONS
Recognize signs and symptoms of STROKE:    F - Face looks uneven. A - Arms unable to move or move evenly. S - Speech slurred or non existent. T - Time - call 911 as soon as signs and symptoms begin - do not go back to bed or wait to see if you get better - TIME IS BRAIN. Patient given copies of Post Birth Warning signs and Post Birth discharge instructions. Help after discharge pamphlet given as well. [email or web address] Activation    Thank you for requesting access to [employer]. Please follow the instructions below to securely access and download your online medical record. [facility] allows you to send messages to your doctor, view your test results, renew your prescriptions, schedule appointments, and more. How Do I Sign Up? 1. In your internet browser, go to [email or web address]. 2. Click on the First Time User? Click Here link in the Sign In box. You will see the New Member Sign Up page. 3. Enter your [employer] Access Code exactly as it appears below. You will not need to use this code after youve completed the sign-up process. If you do not sign up before the expiration date, you must request a new code. [facility] Access Code: Activation code not generated  Current [facility] Status: Active (This is the date your [facility] access code will )    4. Enter the last four digits of your Social Security Number (xxxx) and Date of Birth (mm/dd/yyyy) as indicated and click Submit. You will be taken to the next sign-up page. 5. Create a [facility] ID. This will be your [facility] login ID and cannot be changed, so think of one that is secure and easy to remember. 6. Create a [facility] password. You can change your password at any time. 7. Enter your Password Reset Question and Answer. This can be used at a later time if you forget your password. 8. Enter your e-mail address. You will receive e-mail notification when new information is available in [address] 9. Click Sign Up.  You can now view and download portions of your medical record. 10. Click the Download Summary menu link to download a portable copy of your medical information. Additional Information    If you have questions, please visit the Frequently Asked Questions section of the [employer] website at [email or web address] Remember, [employer] is NOT to be used for urgent needs. For medical emergencies, dial 911. Patient armband removed and given to patient to take home.   Patient was informed of the privacy risks if armband lost or stolen

## 2018-10-20 NOTE — PROGRESS NOTES
Bedside shift change report given to C. Stana Sacks, RN (oncoming nurse) by DOMO Doll RN (offgoing nurse). Report included the following information SBAR, Kardex, Intake/Output, MAR and Recent Results

## 2018-10-20 NOTE — PROGRESS NOTES
2200 BEDSIDE_VERBAL_RECORDED_WRITTEN: shift change report given to A Sample,rn (oncoming nurse) by hugo Méndez (offgoing nurse). Report given with Jim WEINER and MAR.

## 2018-10-20 NOTE — LACTATION NOTE
1030--Mom had supplemented during night--infant is under bili lights and is not going home. Reviewed paced bottle feeding. Mom is to continue double pumping qpc and give whatever she gets.

## 2018-10-20 NOTE — PROGRESS NOTES
Progress Note Patient: Gisela Hatfield MRN: [id number]  SSN: [id number] YOB: 1995  Age: 24 y.o. Sex: female Subjective:  
 
Postpartum Day: 2 Vaginal Delivery The patient is without complaints. The patient is ambulating well. The patient  tolerating a normal diet. Flatus has been passed. The baby is well. Objective:  
  
Patient Vitals for the past 8 hrs: 
 BP Temp Pulse Resp SpO2  
10/20/18 0415 115/71 97.9 °F (36.6 °C) 64 18 98 % 10/20/18 0011 126/70 98.7 °F (37.1 °C) (!) 59 18 99 % LABS: Recent Results (from the past 24 hour(s)) HEMOGLOBIN Collection Time: 10/19/18  5:06 AM  
Result Value Ref Range HGB 9.9 (L) 12.0 - 16.0 g/dL HEMATOCRIT Collection Time: 10/19/18  5:06 AM  
Result Value Ref Range HCT 30.5 (L) 35.0 - 45.0 % Lochia:  appropriate Uterine Fundus:   firm -1 Lab/Data Review: All lab results for the last 24 hours reviewed. Assessment:  
 
Status post: Doing well postpartum vaginal delivery day 2. Plan:  
 
Continue day 2 post-vaginal delivery orders, discharge today. Postpartum care discussed including diet, ambulation, and actvitiy restrictions. Patient is breastfeeding. Continue pre- vitamins. Medications as per MRF and discharge instructions. . Follow-up in six weeks. Signed By: Geoff Palacio CNM 2018 Acute heart failure Acute on chronic diastolic heart failure

## 2018-10-20 NOTE — PROGRESS NOTES
Discharge instructions reviewed, copies given. Questions answered. E-sign done and prescriptions given. Patient will be discharged home without baby. Rooming in information sheet given

## 2018-10-23 NOTE — DISCHARGE SUMMARY
Obstetrical Discharge Summary     Name: Lyle Woody MRN: [id number]  SSN: [id number]    YOB: 1995  Age: 24 y.o. Sex: female      Admit Date: 10/15/2018    Discharge Date: 10/20/2018    Admitting Physician: Miguel Angel Lee MD     Attending Physician:  No att. providers found     Discharge Diagnoses:   Information for the patient's :  Arley March [[id number]]   Delivery of a 5 lb 15.2 oz (2.699 kg) female infant via Vaginal, Spontaneous on 10/18/2018 at 1:49 PM  by . Apgars were 8 and 9. Additional Diagnoses:   Problem List as of 10/20/2018 Date Reviewed: 10/19/2018          Codes Class Noted - Resolved    Spontaneous vaginal delivery ICD-10-CM: O80  ICD-9-CM: 650  10/18/2018 - Present        Nuchal cord affecting delivery ICD-10-CM: H22.99Y6  ICD-9-CM: 663.83  10/18/2018 - Present        Cholestasis of pregnancy in third trimester ICD-10-CM: O26.613, K83.1  ICD-9-CM: 646.73, 576.8  10/15/2018 - Present        IUP (intrauterine pregnancy), incidental ICD-10-CM: Z34.90  ICD-9-CM: V22.2  2018 - Present        Anemia during pregnancy in third trimester ICD-10-CM: O99.013  ICD-9-CM: 648.23  2018 - Present    Overview Signed 2018  4:57 PM by Renetta Garza NP     10.1 on  - OTC iron             * (Principal) Cholestasis during pregnancy in third trimester ICD-10-CM: O26.613, K83.1  ICD-9-CM: 646.73, 576.8  2018 - Present    Overview Addendum 2018 12:44 PM by Kat Jasso MD     IOL scheduled for 10/15/18 @0530  Continue biweekly NST's with modified BPP. Can increase Ursodiol to 500 mg TID if needed. Increased 18.   Repeat BMZ course if deliver before 34 weeks               Rubella non-immune status, antepartum ICD-10-CM: O99.89, Z28.3  ICD-9-CM: 646.83, V15.83  2018 - Present        Maternal varicella, non-immune ICD-10-CM: O09.899, Z28.3  ICD-9-CM: V49.89  2018 - Present        RESOLVED: 35 weeks gestation of pregnancy ICD-10-CM: Z3A.35  ICD-9-CM: V22.2  9/13/2018 - 10/19/2018        RESOLVED: Irregular uterine contractions ICD-10-CM: O62.2  ICD-9-CM: 661.20  9/13/2018 - 10/19/2018        RESOLVED: Normal IUP (intrauterine pregnancy) on prenatal ultrasound ICD-10-CM: Z34.90  ICD-9-CM: V22.2  9/10/2018 - 10/19/2018        RESOLVED: Decreased fetal movement ICD-10-CM: O36.8190  ICD-9-CM: 655.70  8/26/2018 - 10/19/2018        RESOLVED: Cholecystolithiasis affecting pregnancy ICD-10-CM: O26.619, K80.20  ICD-9-CM: 646.80, 574.20  8/24/2018 - 8/26/2018        RESOLVED: Obesity affecting pregnancy in second trimester ICD-10-CM: O99.212  ICD-9-CM: 649.13  7/3/2018 - 10/19/2018    Overview Addendum 8/27/2018 12:59 PM by Stefano Weaver NP     Early 1 hr GTT - 97; NL 1 hr GTT at 28 wks (124)  NST's biweekly at 40 weeks             RESOLVED: Supervision of high risk pregnancy in third trimester ICD-10-CM: O09.93  ICD-9-CM: V23.9  7/3/2018 - 10/19/2018        RESOLVED: Late prenatal care ICD-10-CM: O09.30  ICD-9-CM: V23.7  7/3/2018 - 10/19/2018    Overview Addendum 10/8/2018 12:45 PM by Joanie Alcala MD     7/6/18  Female fetus. EFW 89%., LILLIANA 11/12/18.  8/27/18  EFW 86%. , 1571 grams. 9/11/18  EFW 68% at MFM. Lab Results   Component Value Date/Time    Rubella, External Non-immune 07/03/2018    GrBStrep, External Neg 10/08/2018     No results for input(s): HGB, HGBEXT in the last 72 hours. Hospital Course: Normal hospital course following the delivery. Patient Instructions:   Cannot display discharge medications since this patient is not currently admitted. Reference my discharge instructions.     Follow-up Appointments   Procedures    FOLLOW UP VISIT Appointment in: 6 Weeks     Standing Status:   Standing     Number of Occurrences:   1     Order Specific Question:   Appointment in     Answer:   6 Weeks        Signed By:  Marcie Harrison MD     October 23, 2018                       BST

## 2018-11-19 PROBLEM — O26.613 CHOLESTASIS DURING PREGNANCY IN THIRD TRIMESTER: Status: RESOLVED | Noted: 2018-08-26 | Resolved: 2018-11-19

## 2018-11-19 PROBLEM — Z28.39 RUBELLA NON-IMMUNE STATUS, ANTEPARTUM: Status: RESOLVED | Noted: 2018-07-07 | Resolved: 2018-11-19

## 2018-11-19 PROBLEM — O26.613 CHOLESTASIS OF PREGNANCY IN THIRD TRIMESTER: Status: RESOLVED | Noted: 2018-10-15 | Resolved: 2018-11-19

## 2018-11-19 PROBLEM — O09.899 RUBELLA NON-IMMUNE STATUS, ANTEPARTUM: Status: RESOLVED | Noted: 2018-07-07 | Resolved: 2018-11-19

## 2018-11-19 PROBLEM — K83.1 CHOLESTASIS DURING PREGNANCY IN THIRD TRIMESTER: Status: RESOLVED | Noted: 2018-08-26 | Resolved: 2018-11-19

## 2018-11-19 PROBLEM — O99.013 ANEMIA DURING PREGNANCY IN THIRD TRIMESTER: Status: RESOLVED | Noted: 2018-08-29 | Resolved: 2018-11-19

## 2018-11-19 PROBLEM — K83.1 CHOLESTASIS OF PREGNANCY IN THIRD TRIMESTER: Status: RESOLVED | Noted: 2018-10-15 | Resolved: 2018-11-19

## 2018-11-19 PROBLEM — Z33.1 IUP (INTRAUTERINE PREGNANCY), INCIDENTAL: Status: RESOLVED | Noted: 2018-09-13 | Resolved: 2018-11-19

## 2020-02-18 PROBLEM — O99.211 OBESITY AFFECTING PREGNANCY IN FIRST TRIMESTER: Status: ACTIVE | Noted: 2018-07-03

## 2020-03-04 PROBLEM — O09.899 HISTORY OF PRETERM DELIVERY, CURRENTLY PREGNANT: Status: ACTIVE | Noted: 2020-03-04

## 2020-03-09 PROBLEM — Z87.19 HISTORY OF CHOLESTASIS DURING PREGNANCY: Status: ACTIVE | Noted: 2020-03-09

## 2020-03-09 PROBLEM — Z87.59 HISTORY OF CHOLESTASIS DURING PREGNANCY: Status: ACTIVE | Noted: 2020-03-09

## 2020-05-15 PROBLEM — O36.62X0 EXCESSIVE FETAL GROWTH AFFECTING MANAGEMENT OF PREGNANCY IN SECOND TRIMESTER: Status: ACTIVE | Noted: 2020-05-15

## 2020-05-19 PROBLEM — Z3A.21 21 WEEKS GESTATION OF PREGNANCY: Status: ACTIVE | Noted: 2020-05-19

## 2020-05-27 PROBLEM — Z3A.22 22 WEEKS GESTATION OF PREGNANCY: Status: ACTIVE | Noted: 2020-05-19

## 2020-06-30 PROBLEM — Z3A.27 27 WEEKS GESTATION OF PREGNANCY: Status: ACTIVE | Noted: 2020-05-19

## 2020-07-01 PROBLEM — F42.9 OCD (OBSESSIVE COMPULSIVE DISORDER): Status: ACTIVE | Noted: 2020-07-01

## 2020-07-12 ENCOUNTER — HOSPITAL ENCOUNTER (OUTPATIENT)
Age: 25
Discharge: HOME | End: 2020-07-12
Attending: OBSTETRICS & GYNECOLOGY | Admitting: OBSTETRICS & GYNECOLOGY
Payer: MEDICAID

## 2020-07-12 VITALS
SYSTOLIC BLOOD PRESSURE: 109 MMHG | HEART RATE: 66 BPM | RESPIRATION RATE: 18 BRPM | DIASTOLIC BLOOD PRESSURE: 58 MMHG | TEMPERATURE: 98.1 F

## 2020-07-12 PROBLEM — O36.8190 DECREASED FETAL MOVEMENT: Status: ACTIVE | Noted: 2020-07-12

## 2020-07-12 PROCEDURE — 99282 EMERGENCY DEPT VISIT SF MDM: CPT

## 2020-07-12 PROCEDURE — 59025 FETAL NON-STRESS TEST: CPT

## 2020-07-12 NOTE — PROGRESS NOTES
1120  at 29.2 weeks arrive to unit with complaints of decreased fetal movement. Pt taken to triage 1 for assessment. 1 Spoke with Dr. Paul Benton reviewed NST. Discharge orders received. 1155 Discharge instructions reviewed. Kick count education provided. Pt verbalizes understanding. 1200 Pt ambulates off of unit. Will follow up in office on Wednesday.

## 2020-07-12 NOTE — DISCHARGE INSTRUCTIONS
Patient Education        Counting Your Baby's Kicks: Care Instructions  Your Care Instructions     Counting your baby's kicks is one way your doctor can tell that your baby is healthy. Most women--especially in a first pregnancy--feel their baby move for the first time between 16 and 22 weeks. The movement may feel like flutters rather than kicks. Your baby may move more at certain times of the day. When you are active, you may notice less kicking than when you are resting. At your prenatal visits, your doctor will ask whether the baby is active. In your last trimester, your doctor may ask you to count the number of times you feel your baby move. Follow-up care is a key part of your treatment and safety. Be sure to make and go to all appointments, and call your doctor if you are having problems. It's also a good idea to know your test results and keep a list of the medicines you take. How do you count fetal kicks? · A common method of checking your baby's movement is to count the number of kicks or moves you feel in 1 hour. Ten movements (such as kicks, flutters, or rolls) in 1 hour are normal. Some doctors suggest that you count in the morning until you get to 10 movements. Then you can quit for that day and start again the next day. · Pick your baby's most active time of day to count. This may be any time from morning to evening. · If you do not feel 10 movements in an hour, your baby may be sleeping. Wait for the next hour and count again. When should you call for help? Call your doctor now or seek immediate medical care if:  · You noticed that your baby has stopped moving or is moving much less than normal.  Watch closely for changes in your health, and be sure to contact your doctor if you have any problems. Where can you learn more? Go to http://pat-glen.info/  Enter [id number] in the search box to learn more about \"Counting Your Baby's Kicks: Care Instructions. \"  Current as of: February 11, 2020               Content Version: 12.5  © 9303-9401 Healthwise, Incorporated. Care instructions adapted under license by [employer] (which disclaims liability or warranty for this information). If you have questions about a medical condition or this instruction, always ask your healthcare professional. [address] any warranty or liability for your use of this information.

## 2020-07-29 PROBLEM — Z3A.31 31 WEEKS GESTATION OF PREGNANCY: Status: ACTIVE | Noted: 2020-05-19

## 2020-08-12 PROBLEM — O36.8190 DECREASED FETAL MOVEMENT: Status: RESOLVED | Noted: 2020-07-12 | Resolved: 2020-08-12

## 2020-09-01 ENCOUNTER — HOSPITAL ENCOUNTER (OUTPATIENT)
Age: 25
Discharge: HOME | End: 2020-09-01
Attending: OBSTETRICS & GYNECOLOGY | Admitting: OBSTETRICS & GYNECOLOGY
Payer: MEDICAID

## 2020-09-01 VITALS
SYSTOLIC BLOOD PRESSURE: 118 MMHG | HEART RATE: 84 BPM | TEMPERATURE: 98.5 F | RESPIRATION RATE: 18 BRPM | DIASTOLIC BLOOD PRESSURE: 68 MMHG

## 2020-09-01 PROCEDURE — 59025 FETAL NON-STRESS TEST: CPT

## 2020-09-01 NOTE — DISCHARGE INSTRUCTIONS
Patient Education        Weeks 34 to 39 of Your Pregnancy: Care Instructions  Your Care Instructions    By now, your baby and your belly have grown quite large. It is almost time to give birth. Your baby's lungs are almost ready to breathe air. The bones in your baby's head are now firm enough to protect it, but soft enough to move down through the birth canal.  You may feel excited, happy, anxious, or scared. You may wonder how you will know if you are in labor or what to expect during labor. Try to be flexible in your expectations of the birth. Because each birth is different, there is no way to know exactly what childbirth will be like for you. This care sheet will help you know what to expect and how to prepare. This may make your childbirth easier. If you haven't already had the Tdap shot during this pregnancy, talk to your doctor about getting it. It will help protect your  against pertussis infection. In the 36th week, most women have a test for group B streptococcus (GBS). GBS is a common bacteria that can live in the vagina and rectum. It can make your baby sick after birth. If you test positive, you will get antibiotics during labor. The medicine will keep your baby from getting the bacteria. Follow-up care is a key part of your treatment and safety. Be sure to make and go to all appointments, and call your doctor if you are having problems. It's also a good idea to know your test results and keep a list of the medicines you take. How can you care for yourself at home? Learn about pain relief choices  · Pain is different for every woman. Talk with your doctor about your feelings about pain. · You can choose from several types of pain relief. These include medicine or breathing techniques, as well as comfort measures. You can use more than one option. · If you choose to have pain medicine during labor, talk to your doctor about your options.  Some medicines lower anxiety and help with some of the pain. Others make your lower body numb so that you won't feel pain. · Be sure to tell your doctor about your pain medicine choice before you start labor or very early in your labor. You may be able to change your mind as labor progresses. · Rarely, a woman is put to sleep by medicine given through a mask or an IV. Labor and delivery  · The first stage of labor has three parts: early, active, and transition. ? Most women have early labor at home. You can stay busy or rest, eat light snacks, drink clear fluids, and start counting contractions. ? When talking during a contraction gets hard, you may be moving to active labor. During active labor, you should head for the hospital if you are not there already. ? You are in active labor when contractions come every 3 to 4 minutes and last about 60 seconds. Your cervix is opening more rapidly. ? If your water breaks, contractions will come faster and stronger. ? During transition, your cervix is stretching, and contractions are coming more rapidly. ? You may want to push, but your cervix might not be ready. Your doctor will tell you when to push. · The second stage starts when your cervix is completely opened and you are ready to push. ? Contractions are very strong to push the baby down the birth canal.  ? You will feel the urge to push. You may feel like you need to have a bowel movement. ? You may be coached to push with contractions. These contractions will be very strong, but you will not have them as often. You can get a little rest between contractions. ? You may be emotional and irritable. You may not be aware of what is going on around you.  ? One last push, and your baby is born. · The third stage is when a few more contractions push out the placenta. This may take 30 minutes or less. · The fourth stage is the welcome recovery. You may feel overwhelmed with emotions and exhausted but alert. This is a good time to start breastfeeding.   Where can you learn more? Go to http://pat-glen.info/  Enter B912 in the search box to learn more about \"Weeks 34 to 36 of Your Pregnancy: Care Instructions. \"  Current as of: May 29, 2019Content Version: 12.4  © 1739-6746 Healthwise, Incorporated. Care instructions adapted under license by [employer] (which disclaims liability or warranty for this information). If you have questions about a medical condition or this instruction, always ask your healthcare professional. [address] any warranty or liability for your use of this information.

## 2020-09-01 NOTE — PROGRESS NOTES
1217  at 36.4 weeks arrives to unit from office with complaints of decreased fetal movement. Pt taken to triage 1 for assessment. 1230 Pt turned to left lateral and provided juice. 1300 NST reactive. 1305 Assessment and FHT reviewed with Dr. Angelita Arboleda. Discharge order received. 1310 Discharge instruction reviewed. 1314 Pt ambulates off of unit.

## 2020-09-22 ENCOUNTER — HOSPITAL ENCOUNTER (OUTPATIENT)
Age: 25
Discharge: HOME | DRG: 560 | End: 2020-09-22
Attending: OBSTETRICS & GYNECOLOGY | Admitting: OBSTETRICS & GYNECOLOGY
Payer: MEDICAID

## 2020-09-22 VITALS
BODY MASS INDEX: 37.28 KG/M2 | DIASTOLIC BLOOD PRESSURE: 77 MMHG | SYSTOLIC BLOOD PRESSURE: 120 MMHG | HEIGHT: 65 IN | HEART RATE: 83 BPM | RESPIRATION RATE: 18 BRPM | TEMPERATURE: 98.1 F

## 2020-09-22 PROCEDURE — 59025 FETAL NON-STRESS TEST: CPT

## 2020-09-22 NOTE — PROGRESS NOTES
165:   39+4 weeks gestation ambulated to unit for weekly NST. Patient taken to [address] and oriented to call bell, provided with gown VS taken, and pt placed on the monitor. : Called Dr Shay Leon and gave update. Dr Shay Leon reveiwed strip and orders received to DC pt. NST reactive    : DC instruction reviewed with pt.  Pt ambulated off floor

## 2020-09-22 NOTE — DISCHARGE INSTRUCTIONS
Patient Education   Patient Education        Preparing for Childbirth: Care Instructions  Your Care Instructions     You are getting close to the birth of your child. For months, you've been taking care of yourself and the baby. Now you can still take steps that will help you have a healthy labor and birth. You can take classes to help you and your partner or  prepare for the birth. You also can talk with your doctor about what you would like to happen during your labor. Changes happen in the last 2 months of your pregnancy. Your baby becomes too big to move around easily inside the uterus and may seem to move less. At the end of your pregnancy, your baby probably will settle into a head-down position. You will likely feel some pressure in your pelvis as you get close to the birth. You may notice times when your belly tightens and becomes firm to the touch, then relaxes. These are called Randell Holly contractions. They sometimes occur as often as every 10 to 20 minutes. These contractions usually stop when you are active. (True labor pains continue or increase if you move around.)  Rupture of your membranes (\"breaking of the water\") often is a sign that labor has started or is about to start. This happens when a hole or tear develops in the fluid-filled bag (amniotic sac) that surrounds and protects your baby. You may feel a huge gush of water or a steady trickle of fluid. Call your doctor or go to the hospital if you think this has happened. Contractions may start, or if you are already having contractions, they may get stronger. Follow-up care is a key part of your treatment and safety. Be sure to make and go to all appointments, and call your doctor if you are having problems. It's also a good idea to know your test results and keep a list of the medicines you take. How can you care for yourself at home? · Get plenty of rest.  · Take childbirth classes with your partner or .  You will learn relaxation exercises that are helpful during labor. You also will learn what you can do to manage labor pain. · If you have other children, take a class to learn how to help them adjust to the new baby. · Develop a written birth plan, if you choose to, keeping in mind that labor is hard to predict and your plan may change after labor begins. Some of what a birth plan may address includes:  ? Where you would like to have your baby. This includes the building and the room. It could be the hospital's birthing room, a separate birthing center, or your own home. ? Who you would like to assist with delivery of your baby. You may want your doctor, an obstetrician, or a certified nurse-midwife. Some women prefer a lay midwife or a  to provide support before and after delivery. ? Whether you want a , nurse, midwife, or  to give you support from early labor until after childbirth. ? What comfort measures you want. You may have to choose between walking around during labor or having the security of a heart monitor for your baby. You may want to listen to music during labor. You may know what position you want to be in (such as sitting, squatting, or reclining) for pushing. ? What pain relief you would like. There are several choices, so be sure to talk with your doctor about them. ? How you want you and your baby to spend the first few hours after birth.  ? You may want to keep your baby with you for at least 1 hour after birth for bonding and early breastfeeding. ? You may want the hospital to delay some infant care steps, such as a vitamin K injection, so that you have calming time with your baby. ? You may not want visitors, or you may want other family members there. · Know the early signs of labor, such as a steady ache low in your back. · If you are going to a hospital or clinic to have your baby, have your bag ready to go. ? Pack a nightgown, robe, panties, socks, and slippers. You may want to bring your own soap, shampoo, brush, toothbrush, and toothpaste. Bring your own self-adhesive sanitary pads. ? In your baby's bag, bring an outfit, small blanket, and diapers. Have your car seat ready to go. When should you call for help? Watch closely for changes in your health, and be sure to contact your doctor if you have any questions about preparing for childbirth. Where can you learn more? Go to http://www.gray.com/  Enter [id number] in the search box to learn more about \"Preparing for Childbirth: Care Instructions. \"  Current as of: 2020               Content Version: 12.6   [employer]. Care instructions adapted under license by [employer] (which disclaims liability or warranty for this information). If you have questions about a medical condition or this instruction, always ask your healthcare professional. [place] [address] any warranty or liability for your use of this information. Week 39 of Your Pregnancy: Care Instructions  Your Care Instructions     During these final weeks, you may feel anxious to see your new baby. [place] babies often look different from what you see in pictures or movies. Right after birth, their heads may have a strange shape. Their eyes may be puffy. And their genitals may be swollen. They may also have very dry skin, or red marks on the eyelids, nose, or neck. Still, most parents think their babies are beautiful. Follow-up care is a key part of your treatment and safety. Be sure to make and go to all appointments, and call your doctor if you are having problems. It's also a good idea to know your test results and keep a list of the medicines you take. How can you care for yourself at home? Prepare to breastfeed  · If you are breastfeeding, continue to eat healthy foods.   · If your health care provider recommends it, keep taking your prenatal vitamins. · Talk to your doctor before you take any medicine or supplement. That's because some medicines can affect your breast milk. · You can help prevent sore nipples if you feed your baby in the correct position. Nurses will help you learn to do this. Choose the right birth control after your baby is born  · Women who are breastfeeding can still get pregnant. Use birth control if you don't want to get pregnant. · Intrauterine devices (IUDs) are very effective at preventing pregnancy and can provide birth control for 3 to 10 years, depending on the type. If you talk with your doctor before having your baby, the IUD can be placed right after you give birth. If you decide you want to get pregnant later, you can have it removed. They are safe to use while you are breastfeeding. · A hormonal implant is also very effective at preventing pregnancy. It is placed under the skin of the arm. This can be done right after you give birth. It releases the hormone progestin and prevents pregnancy for about 3 years. This can also be removed by a doctor at any time. It is safe to use while you are breastfeeding. · Depo-Provera can be used while you are breastfeeding. It is a shot you get every 3 months. · Birth control pills work well. But you need a different kind of pill for the first few weeks after giving birth. And when you start taking these pills, you need to make sure to use another type of birth control for 7 days after you start your pack. · Diaphragms, cervical caps, and condoms with spermicide work less well after birth. If you have a diaphragm or cervical cap, talk to your doctor to see if you need a different size. · Tubal ligation (tying your tubes) and vasectomy are both permanent. These are good options if you are sure you are done having children. Where can you learn more?   Go to http://www.gray.com/  Enter A811 in the search box to learn more about \"Week 39 of Your Pregnancy: Care Instructions. \"  Current as of: February 11, 2020               Content Version: 12.6  © 0142-4770 [employer]. Care instructions adapted under license by [employer] (which disclaims liability or warranty for this information). If you have questions about a medical condition or this instruction, always ask your healthcare professional. [place] [address] any warranty or liability for your use of this information.

## 2020-09-25 ENCOUNTER — ANESTHESIA (OUTPATIENT)
Dept: LABOR AND DELIVERY | Age: 25
DRG: 560 | End: 2020-09-25
Payer: MEDICAID

## 2020-09-25 ENCOUNTER — HOSPITAL ENCOUNTER (INPATIENT)
Age: 25
LOS: 2 days | Discharge: HOME | DRG: 560 | End: 2020-09-27
Attending: OBSTETRICS & GYNECOLOGY | Admitting: STUDENT IN AN ORGANIZED HEALTH CARE EDUCATION/TRAINING PROGRAM
Payer: MEDICAID

## 2020-09-25 ENCOUNTER — ANESTHESIA EVENT (OUTPATIENT)
Dept: LABOR AND DELIVERY | Age: 25
DRG: 560 | End: 2020-09-25
Payer: MEDICAID

## 2020-09-25 PROBLEM — Z33.1 IUP (INTRAUTERINE PREGNANCY), INCIDENTAL: Status: ACTIVE | Noted: 2020-09-25

## 2020-09-25 LAB
A1 MICROGLOB PLACENTAL VAG QL: POSITIVE
ABO + RH BLD: NORMAL
BASOPHILS # BLD: 0 K/UL (ref 0–0.1)
BASOPHILS NFR BLD: 0 % (ref 0–2)
BLOOD GROUP ANTIBODIES SERPL: NORMAL
CONTROL LINE PRESENT?: NORMAL
DIFFERENTIAL METHOD BLD: ABNORMAL
EOSINOPHIL # BLD: 0.1 K/UL (ref 0–0.4)
EOSINOPHIL NFR BLD: 1 % (ref 0–5)
ERYTHROCYTE [DISTWIDTH] IN BLOOD BY AUTOMATED COUNT: 13.9 % (ref 11.6–14.5)
EXPIRATION DATE: NORMAL
HCT VFR BLD AUTO: 35.3 % (ref 35–45)
HGB BLD-MCNC: 11.8 G/DL (ref 12–16)
INTERNAL NEGATIVE CONTROL: NORMAL
KIT LOT NO.: NORMAL
LYMPHOCYTES # BLD: 3 K/UL (ref 0.9–3.6)
LYMPHOCYTES NFR BLD: 25 % (ref 21–52)
MCH RBC QN AUTO: 29.6 PG (ref 24–34)
MCHC RBC AUTO-ENTMCNC: 33.4 G/DL (ref 31–37)
MCV RBC AUTO: 88.5 FL (ref 74–97)
MONOCYTES # BLD: 0.8 K/UL (ref 0.05–1.2)
MONOCYTES NFR BLD: 7 % (ref 3–10)
NEUTS SEG # BLD: 7.9 K/UL (ref 1.8–8)
NEUTS SEG NFR BLD: 67 % (ref 40–73)
PLATELET # BLD AUTO: 272 K/UL (ref 135–420)
PMV BLD AUTO: 11.1 FL (ref 9.2–11.8)
RBC # BLD AUTO: 3.99 M/UL (ref 4.2–5.3)
SPECIMEN EXP DATE BLD: NORMAL
WBC # BLD AUTO: 11.8 K/UL (ref 4.6–13.2)

## 2020-09-25 PROCEDURE — 74011000250 HC RX REV CODE- 250: Performed by: ANESTHESIOLOGY

## 2020-09-25 PROCEDURE — 74011250636 HC RX REV CODE- 250/636: Performed by: ANESTHESIOLOGY

## 2020-09-25 PROCEDURE — 75410000003 HC RECOV DEL/VAG/CSECN EA 0.5 HR

## 2020-09-25 PROCEDURE — 77010026065 HC OXYGEN MINIMUM MEDICAL AIR

## 2020-09-25 PROCEDURE — 59025 FETAL NON-STRESS TEST: CPT

## 2020-09-25 PROCEDURE — 74011000250 HC RX REV CODE- 250: Performed by: STUDENT IN AN ORGANIZED HEALTH CARE EDUCATION/TRAINING PROGRAM

## 2020-09-25 PROCEDURE — 0UQMXZZ REPAIR VULVA, EXTERNAL APPROACH: ICD-10-PCS | Performed by: STUDENT IN AN ORGANIZED HEALTH CARE EDUCATION/TRAINING PROGRAM

## 2020-09-25 PROCEDURE — 65270000029 HC RM PRIVATE

## 2020-09-25 PROCEDURE — 76060000078 HC EPIDURAL ANESTHESIA

## 2020-09-25 PROCEDURE — 77030007879 HC KT SPN EPDRL TELE -B: Performed by: ANESTHESIOLOGY

## 2020-09-25 PROCEDURE — 75410000002 HC LABOR FEE PER 1 HR

## 2020-09-25 PROCEDURE — 0HQ9XZZ REPAIR PERINEUM SKIN, EXTERNAL APPROACH: ICD-10-PCS | Performed by: STUDENT IN AN ORGANIZED HEALTH CARE EDUCATION/TRAINING PROGRAM

## 2020-09-25 PROCEDURE — 84112 EVAL AMNIOTIC FLUID PROTEIN: CPT | Performed by: STUDENT IN AN ORGANIZED HEALTH CARE EDUCATION/TRAINING PROGRAM

## 2020-09-25 PROCEDURE — 74011250637 HC RX REV CODE- 250/637: Performed by: STUDENT IN AN ORGANIZED HEALTH CARE EDUCATION/TRAINING PROGRAM

## 2020-09-25 PROCEDURE — 75410000000 HC DELIVERY VAGINAL/SINGLE

## 2020-09-25 PROCEDURE — 00HU33Z INSERTION OF INFUSION DEVICE INTO SPINAL CANAL, PERCUTANEOUS APPROACH: ICD-10-PCS | Performed by: ANESTHESIOLOGY

## 2020-09-25 PROCEDURE — 99284 EMERGENCY DEPT VISIT MOD MDM: CPT

## 2020-09-25 PROCEDURE — 85025 COMPLETE CBC W/AUTO DIFF WBC: CPT

## 2020-09-25 PROCEDURE — 86900 BLOOD TYPING SEROLOGIC ABO: CPT

## 2020-09-25 PROCEDURE — 74011250636 HC RX REV CODE- 250/636: Performed by: STUDENT IN AN ORGANIZED HEALTH CARE EDUCATION/TRAINING PROGRAM

## 2020-09-25 RX ORDER — OXYTOCIN/0.9 % SODIUM CHLORIDE 30/500 ML
PLASTIC BAG, INJECTION (ML) INTRAVENOUS
Status: DISPENSED
Start: 2020-09-25 | End: 2020-09-26

## 2020-09-25 RX ORDER — LIDOCAINE HYDROCHLORIDE AND EPINEPHRINE 15; 5 MG/ML; UG/ML
INJECTION, SOLUTION EPIDURAL AS NEEDED
Status: DISCONTINUED | OUTPATIENT
Start: 2020-09-25 | End: 2020-09-26 | Stop reason: HOSPADM

## 2020-09-25 RX ORDER — BUPIVACAINE HYDROCHLORIDE 2.5 MG/ML
INJECTION, SOLUTION EPIDURAL; INFILTRATION; INTRACAUDAL AS NEEDED
Status: DISCONTINUED | OUTPATIENT
Start: 2020-09-25 | End: 2020-09-26 | Stop reason: HOSPADM

## 2020-09-25 RX ORDER — OXYTOCIN/0.9 % SODIUM CHLORIDE 20/1000 ML
999 PLASTIC BAG, INJECTION (ML) INTRAVENOUS ONCE
Status: DISPENSED | OUTPATIENT
Start: 2020-09-25 | End: 2020-09-26

## 2020-09-25 RX ORDER — SODIUM CHLORIDE 0.9 % (FLUSH) 0.9 %
5-40 SYRINGE (ML) INJECTION EVERY 8 HOURS
Status: DISCONTINUED | OUTPATIENT
Start: 2020-09-25 | End: 2020-09-26 | Stop reason: HOSPADM

## 2020-09-25 RX ORDER — SODIUM CHLORIDE 0.9 % (FLUSH) 0.9 %
5-40 SYRINGE (ML) INJECTION AS NEEDED
Status: DISCONTINUED | OUTPATIENT
Start: 2020-09-25 | End: 2020-09-26 | Stop reason: HOSPADM

## 2020-09-25 RX ORDER — LIDOCAINE HYDROCHLORIDE 10 MG/ML
INJECTION INFILTRATION; PERINEURAL AS NEEDED
Status: DISCONTINUED | OUTPATIENT
Start: 2020-09-25 | End: 2020-09-26 | Stop reason: HOSPADM

## 2020-09-25 RX ORDER — TERBUTALINE SULFATE 1 MG/ML
0.25 INJECTION SUBCUTANEOUS
Status: DISCONTINUED | OUTPATIENT
Start: 2020-09-25 | End: 2020-09-26 | Stop reason: HOSPADM

## 2020-09-25 RX ORDER — NALBUPHINE HYDROCHLORIDE 10 MG/ML
10 INJECTION, SOLUTION INTRAMUSCULAR; INTRAVENOUS; SUBCUTANEOUS
Status: DISCONTINUED | OUTPATIENT
Start: 2020-09-25 | End: 2020-09-26 | Stop reason: HOSPADM

## 2020-09-25 RX ORDER — METHYLERGONOVINE MALEATE 0.2 MG/ML
0.2 INJECTION INTRAVENOUS AS NEEDED
Status: DISCONTINUED | OUTPATIENT
Start: 2020-09-25 | End: 2020-09-26 | Stop reason: HOSPADM

## 2020-09-25 RX ORDER — SODIUM CHLORIDE, SODIUM LACTATE, POTASSIUM CHLORIDE, CALCIUM CHLORIDE 600; 310; 30; 20 MG/100ML; MG/100ML; MG/100ML; MG/100ML
125 INJECTION, SOLUTION INTRAVENOUS CONTINUOUS
Status: DISCONTINUED | OUTPATIENT
Start: 2020-09-25 | End: 2020-09-26 | Stop reason: HOSPADM

## 2020-09-25 RX ORDER — HYDROMORPHONE HYDROCHLORIDE 1 MG/ML
1 INJECTION, SOLUTION INTRAMUSCULAR; INTRAVENOUS; SUBCUTANEOUS
Status: DISCONTINUED | OUTPATIENT
Start: 2020-09-25 | End: 2020-09-26 | Stop reason: HOSPADM

## 2020-09-25 RX ORDER — LIDOCAINE HYDROCHLORIDE 10 MG/ML
20 INJECTION, SOLUTION EPIDURAL; INFILTRATION; INTRACAUDAL; PERINEURAL AS NEEDED
Status: COMPLETED | OUTPATIENT
Start: 2020-09-25 | End: 2020-09-25

## 2020-09-25 RX ORDER — FENTANYL/ROPIVACAINE/NS/PF 2MCG/ML-.1
1-15 PLASTIC BAG, INJECTION (ML) EPIDURAL
Status: DISCONTINUED | OUTPATIENT
Start: 2020-09-25 | End: 2020-09-26 | Stop reason: HOSPADM

## 2020-09-25 RX ORDER — MINERAL OIL
30 OIL (ML) ORAL AS NEEDED
Status: COMPLETED | OUTPATIENT
Start: 2020-09-25 | End: 2020-09-25

## 2020-09-25 RX ORDER — PHENYLEPHRINE HCL IN 0.9% NACL 1 MG/10 ML
80 SYRINGE (ML) INTRAVENOUS AS NEEDED
Status: DISCONTINUED | OUTPATIENT
Start: 2020-09-25 | End: 2020-09-26 | Stop reason: HOSPADM

## 2020-09-25 RX ORDER — NALOXONE HYDROCHLORIDE 0.4 MG/ML
0.2 INJECTION, SOLUTION INTRAMUSCULAR; INTRAVENOUS; SUBCUTANEOUS AS NEEDED
Status: DISCONTINUED | OUTPATIENT
Start: 2020-09-25 | End: 2020-09-26 | Stop reason: HOSPADM

## 2020-09-25 RX ORDER — FENTANYL CITRATE 50 UG/ML
INJECTION, SOLUTION INTRAMUSCULAR; INTRAVENOUS AS NEEDED
Status: DISCONTINUED | OUTPATIENT
Start: 2020-09-25 | End: 2020-09-26 | Stop reason: HOSPADM

## 2020-09-25 RX ORDER — BUTORPHANOL TARTRATE 2 MG/ML
2 INJECTION INTRAMUSCULAR; INTRAVENOUS
Status: DISCONTINUED | OUTPATIENT
Start: 2020-09-25 | End: 2020-09-26 | Stop reason: HOSPADM

## 2020-09-25 RX ORDER — OXYTOCIN/0.9 % SODIUM CHLORIDE 30/500 ML
0-25 PLASTIC BAG, INJECTION (ML) INTRAVENOUS
Status: DISCONTINUED | OUTPATIENT
Start: 2020-09-25 | End: 2020-09-27 | Stop reason: HOSPADM

## 2020-09-25 RX ORDER — FENTANYL CITRATE 50 UG/ML
100 INJECTION, SOLUTION INTRAMUSCULAR; INTRAVENOUS ONCE
Status: COMPLETED | OUTPATIENT
Start: 2020-09-25 | End: 2020-09-25

## 2020-09-25 RX ORDER — OXYTOCIN/0.9 % SODIUM CHLORIDE 20/1000 ML
125 PLASTIC BAG, INJECTION (ML) INTRAVENOUS CONTINUOUS
Status: DISCONTINUED | OUTPATIENT
Start: 2020-09-25 | End: 2020-09-26 | Stop reason: HOSPADM

## 2020-09-25 RX ADMIN — BUTORPHANOL TARTRATE 2 MG: 2 INJECTION, SOLUTION INTRAMUSCULAR; INTRAVENOUS at 19:22

## 2020-09-25 RX ADMIN — Medication 10 ML/HR: at 20:50

## 2020-09-25 RX ADMIN — LIDOCAINE HYDROCHLORIDE: 10 INJECTION, SOLUTION EPIDURAL; INFILTRATION; INTRACAUDAL; PERINEURAL at 22:13

## 2020-09-25 RX ADMIN — LIDOCAINE HYDROCHLORIDE 3 ML: 10 INJECTION, SOLUTION INFILTRATION; PERINEURAL at 20:20

## 2020-09-25 RX ADMIN — SODIUM CHLORIDE, SODIUM LACTATE, POTASSIUM CHLORIDE, AND CALCIUM CHLORIDE 125 ML/HR: 600; 310; 30; 20 INJECTION, SOLUTION INTRAVENOUS at 18:56

## 2020-09-25 RX ADMIN — LIDOCAINE HYDROCHLORIDE,EPINEPHRINE BITARTRATE 3 ML: 15; .005 INJECTION, SOLUTION EPIDURAL; INFILTRATION; INTRACAUDAL; PERINEURAL at 20:24

## 2020-09-25 RX ADMIN — Medication 2 MILLI-UNITS/MIN: at 17:45

## 2020-09-25 RX ADMIN — Medication 125 ML/HR: at 23:04

## 2020-09-25 RX ADMIN — SODIUM CHLORIDE, SODIUM LACTATE, POTASSIUM CHLORIDE, AND CALCIUM CHLORIDE 1000 ML: 600; 310; 30; 20 INJECTION, SOLUTION INTRAVENOUS at 17:00

## 2020-09-25 RX ADMIN — MINERAL OIL 30 ML: 1000 SOLUTION ORAL at 22:00

## 2020-09-25 RX ADMIN — FENTANYL CITRATE 100 MCG: 50 INJECTION, SOLUTION INTRAMUSCULAR; INTRAVENOUS at 20:20

## 2020-09-25 RX ADMIN — BUPIVACAINE HYDROCHLORIDE 8 ML: 2.5 INJECTION, SOLUTION EPIDURAL; INFILTRATION; INTRACAUDAL; PERINEURAL at 20:23

## 2020-09-25 RX ADMIN — FENTANYL CITRATE 100 MCG: 50 INJECTION, SOLUTION INTRAMUSCULAR; INTRAVENOUS at 20:25

## 2020-09-25 NOTE — PROGRESS NOTES
Dr Ronald Peng here- he has been informed pt is starting to move- At approx 1900, pt started to become very uncomfortable, and her contractions are q 2 min with the IV pitocin now at 4 milliunits/min. Pt asked for and was medicated with 2 mg IV stadol at approx 1925. Pt's last baby was < 2 ys ago- Dr Silvana Camacho to head in here shortly.

## 2020-09-25 NOTE — H&P
Ostetrical History and Physical    Subjective:     Date of Admission: 2020    Patient is a 31 y.o.   female admitted with SROM following routine OB visit. She was 4cm at that visit and upon arrival she was here she remains 4cm. For Obstetric history, see deonl.    For Review of Systems, see prenatal    Past Medical History:   Diagnosis Date    Anemia during pregnancy in third trimester 2018    Cholestasis during pregnancy in third trimester 2018    OCD (obsessive compulsive disorder) 2020      Past Surgical History:   Procedure Laterality Date    HX LAP CHOLECYSTECTOMY  2019    HX WISDOM TEETH EXTRACTION        Prior to Admission medications    Medication Sig Start Date End Date Taking? Authorizing Provider   sertraline (ZOLOFT) 50 mg tablet TAKE 1/2 TABLET BY MOUTH DAILY FOR 4 DAYS, THEN TAKE 1 TABLET DAILY STARTING ON DAY 5 20   Trupti Bhatia PA   diphenhydrAMINE (BenadryL) 25 mg capsule Take 25 mg by mouth every six (6) hours as needed. Provider, Historical   PNV Comb #2-Iron-FA-Omega 3 29-1-400 mg cmpk Take  by mouth. Provider, Historical     No Known Allergies   Social History     Tobacco Use    Smoking status: Never Smoker    Smokeless tobacco: Never Used   Substance Use Topics    Alcohol use: No      Family History   Problem Relation Age of Onset    Hypertension Maternal Grandmother     Diabetes Maternal Grandmother     Kidney Disease Maternal Grandmother     Hypertension Maternal Grandfather     Diabetes Maternal Grandfather     Kidney Disease Maternal Grandfather           Objective:     Height 5' 5\" (1.651 m), last menstrual period 2019, not currently breastfeeding. Temp (24hrs), Av.6 °F (35.9 °C), Min:96.6 °F (35.9 °C), Max:96.6 °F (35.9 °C)    Per my or RN examination  HEENT: No pallor, no jaundice, Thyroid and throat normal  RESPIRATORY: Clear to A & P  CVS: pulse reg, HS normal  ABDOMEN: Gravid. Vertex. EFW=7lb +-1lb.  No abnormal tenderness. Pelvic: Cervix 4  Data Review:   No results found for this or any previous visit (from the past 24 hour(s)). Monitor:  Reactivity:present Variability:present Baseline:within normal limits    Assessment:     Active Problems:    IUP (intrauterine pregnancy), incidental (9/25/2020)    AROM clear amnisure positive    Plan:     AOL with pitocin  GBS negative    I saw and examined patient.     Signed By: Cecilia Bridges MD                         September 25, 2020

## 2020-09-25 NOTE — PROGRESS NOTES
Dr Dillon Stanton has reviewed EFM strip from arrival through present time. He is aware of variable decels.  Plan is to reposition pt onto left side, and start IV pitocin

## 2020-09-25 NOTE — PROGRESS NOTES
Pt here arriving here ambulatory with significant other. Here with c/opossible SROM, which may have occurred at approx 1300 today. Pt was seen today at office appt at 1100 A and was 4 cm and intact at office appt. Her water broke after she got home. Staes she's leaking clear fluid. Did not wear a pad in here, states her panties she wore in here are wet

## 2020-09-25 NOTE — PROGRESS NOTES
Dr Adriana Moe in to see pt. Plan is start IV pitocin. If pt not in active labor by 2000, plan is low dose pitocin overnight, so pt is able to get some sleep overnight.

## 2020-09-26 PROCEDURE — 74011250637 HC RX REV CODE- 250/637: Performed by: STUDENT IN AN ORGANIZED HEALTH CARE EDUCATION/TRAINING PROGRAM

## 2020-09-26 PROCEDURE — 65270000029 HC RM PRIVATE

## 2020-09-26 RX ORDER — IBUPROFEN 400 MG/1
800 TABLET ORAL
Status: DISCONTINUED | OUTPATIENT
Start: 2020-09-26 | End: 2020-09-27 | Stop reason: HOSPADM

## 2020-09-26 RX ORDER — ZOLPIDEM TARTRATE 5 MG/1
5 TABLET ORAL
Status: DISCONTINUED | OUTPATIENT
Start: 2020-09-26 | End: 2020-09-27 | Stop reason: HOSPADM

## 2020-09-26 RX ORDER — OXYCODONE AND ACETAMINOPHEN 5; 325 MG/1; MG/1
2 TABLET ORAL
Status: DISCONTINUED | OUTPATIENT
Start: 2020-09-26 | End: 2020-09-27 | Stop reason: HOSPADM

## 2020-09-26 RX ORDER — ACETAMINOPHEN 325 MG/1
650 TABLET ORAL
Status: DISCONTINUED | OUTPATIENT
Start: 2020-09-26 | End: 2020-09-27 | Stop reason: HOSPADM

## 2020-09-26 RX ORDER — PROMETHAZINE HYDROCHLORIDE 25 MG/ML
25 INJECTION, SOLUTION INTRAMUSCULAR; INTRAVENOUS
Status: DISCONTINUED | OUTPATIENT
Start: 2020-09-26 | End: 2020-09-27 | Stop reason: HOSPADM

## 2020-09-26 RX ORDER — AMOXICILLIN 250 MG
1 CAPSULE ORAL
Status: DISCONTINUED | OUTPATIENT
Start: 2020-09-26 | End: 2020-09-27 | Stop reason: HOSPADM

## 2020-09-26 RX ADMIN — IBUPROFEN 800 MG: 400 TABLET, FILM COATED ORAL at 16:43

## 2020-09-26 RX ADMIN — IBUPROFEN 800 MG: 400 TABLET, FILM COATED ORAL at 02:58

## 2020-09-26 NOTE — PROGRESS NOTES
0720 Bedside and Verbal shift change report given to FAHEEM Wang RN (oncoming nurse) by Margo Burciaga RN (offgoing nurse). Report included the following information SBAR, Kardex, Intake/Output, MAR and Recent Results. Patient eating breakfast at this time. Up to bathroom, voided 900 mL. Sondra care education provided. Back to bed with no assistance. 1150 TRANSFER - OUT REPORT:    Verbal report given to ANA PAULA Medrano RN(name) on [facility] [place]  being transferred to postpartum (unit) for routine progression of care       Report consisted of patients Situation, Background, Assessment and   Recommendations(SBAR). Information from the following report(s) SBAR, Kardex, Intake/Output, MAR and Recent Results was reviewed with the receiving nurse. Opportunity for questions and clarification was provided.       Patient transported with:   Registered Nurse

## 2020-09-26 NOTE — ANESTHESIA PREPROCEDURE EVALUATION
Relevant Problems   No relevant active problems       Anesthetic History   No history of anesthetic complications            Review of Systems / Medical History  Patient summary reviewed, nursing notes reviewed and pertinent labs reviewed    Pulmonary  Within defined limits                 Neuro/Psych             Comments: OCD Cardiovascular                  Exercise tolerance: >4 METS     GI/Hepatic/Renal           Liver disease     Endo/Other        Obesity     Other Findings              Physical Exam    Airway  Mallampati: II  TM Distance: 4 - 6 cm  Neck ROM: normal range of motion   Mouth opening: Normal     Cardiovascular               Dental  No notable dental hx       Pulmonary                 Abdominal  GI exam deferred       Other Findings            Anesthetic Plan    ASA: 2  Anesthesia type: epidural            Anesthetic plan and risks discussed with: Patient

## 2020-09-26 NOTE — PROGRESS NOTES
Post-Partum Day Number 1 Progress Note    [facility]     Assessment:   Hospital Problems  Date Reviewed: 2020          Codes Class Noted POA    IUP (intrauterine pregnancy), incidental ICD-10-CM: Z33.1  ICD-9-CM: V22.2  2020 Unknown            Doing well, post partum day 1    Plan:  1. Continue routine postpartum and perineal care as well as maternal education. 2. N/A     Information for the patient's :  Wily Cardoza [[id number]]   Vaginal, Spontaneous    Patient doing well without significant complaint. Voiding without difficulty, normal lochia. Current Facility-Administered Medications   Medication Dose Route Frequency    oxytocin (PITOCIN) 30 units/500 ml NS  0-25 desiree-units/min IntraVENous TITRATE       Vitals:  Visit Vitals  /69   Pulse 75   Temp 98.6 °F (37 °C)   Resp 18   Ht 5' 5\" (1.651 m)   LMP 2019 (Approximate)   SpO2 99%   Breastfeeding Yes   BMI 37.36 kg/m²     Temp (24hrs), Av.4 °F (36.9 °C), Min:98 °F (36.7 °C), Max:98.7 °F (37.1 °C)    Exam:   Patient without distress. Abdomen soft, fundus firm, nontender                Perineum with normal lochia noted. Lower extremities are negative for swelling, cords or tenderness.     Labs:     Lab Results   Component Value Date/Time    WBC 11.8 2020 05:00 PM    WBC 10.2 2020 03:52 PM    WBC 11.9 (H) 2020 12:23 PM    HGB 11.8 (L) 2020 05:00 PM    HGB 11.2 (L) 2020 10:35 AM    HGB 11.8 2020 03:52 PM    HCT 35.3 2020 05:00 PM    HCT 39.0 2020 03:52 PM    HCT 43.0 2020 12:23 PM    PLATELET 767  05:00 PM    PLATELET 328  03:52 PM    PLATELET 661  12:23 PM    Hgb, External 11.3 2018    Hgb, External 11.9 2018    Hct, External 36.0 2018    Platelet cnt., External 363 2018       Recent Results (from the past 24 hour(s))   RUPTURE OF FETAL MEMBRANES, POC    Collection Time: 09/25/20  4:00 PM   Result Value Ref Range    Rupture of fetal membrane Positive Negative    Control line present? Acceptable     Internal negative control Acceptable     Kit Lot No. [id number]     Expiration date 3-8-23    CBC WITH AUTOMATED DIFF    Collection Time: 09/25/20  5:00 PM   Result Value Ref Range    WBC 11.8 4.6 - 13.2 K/uL    RBC 3.99 (L) 4.20 - 5.30 M/uL    HGB 11.8 (L) 12.0 - 16.0 g/dL    HCT 35.3 35.0 - 45.0 %    MCV 88.5 74.0 - 97.0 FL    MCH 29.6 24.0 - 34.0 PG    MCHC 33.4 31.0 - 37.0 g/dL    RDW 13.9 11.6 - 14.5 %    PLATELET 181 233 - 129 K/uL    MPV 11.1 9.2 - 11.8 FL    NEUTROPHILS 67 40 - 73 %    LYMPHOCYTES 25 21 - 52 %    MONOCYTES 7 3 - 10 %    EOSINOPHILS 1 0 - 5 %    BASOPHILS 0 0 - 2 %    ABS. NEUTROPHILS 7.9 1.8 - 8.0 K/UL    ABS. LYMPHOCYTES 3.0 0.9 - 3.6 K/UL    ABS. MONOCYTES 0.8 0.05 - 1.2 K/UL    ABS. EOSINOPHILS 0.1 0.0 - 0.4 K/UL    ABS.  BASOPHILS 0.0 0.0 - 0.1 K/UL    DF AUTOMATED     TYPE & SCREEN    Collection Time: 09/25/20  5:00 PM   Result Value Ref Range    Crossmatch Expiration 09/28/2020     ABO/Rh(D) A POSITIVE     Antibody screen NEG        MD KUNAL Yanes  9/26/2020  12:47 PM

## 2020-09-26 NOTE — PROGRESS NOTES
0460 Bedside shift change report given to Lul Covarrubias RN (oncoming nurse) by PABLO Aponte RN (offgoing nurse). Report included the following information SBAR, Kardex, Intake/Output, MAR and Recent Results. 2595 Bedside shift change report given to RADHA Wang RN (oncoming nurse) by Lul Covarrubias RN (offgoing nurse). Report included the following information SBAR, Kardex, Intake/Output, MAR and Recent Results.

## 2020-09-26 NOTE — L&D DELIVERY NOTE
Delivery Note    Obstetrician: Luisana    Assistant: none    Pre-Delivery Diagnosis: Term pregnancy, Induced labor and Uncomplicated pregnancy    Post-Delivery Diagnosis: Living  infant(s) and Female    Intrapartum Event: None    Procedure: Spontaneous vaginal delivery    Epidural: YES    Monitor:  Fetal Heart Tones - External and Uterine Contractions - External    Indications for instrumental delivery: none    Estimated Blood Loss: 150    Episiotomy: n/a    Laceration(s):  1st degree and labial bilateral    Laceration(s) repair: YES 3-0 vicryl on 1st degree and L labia    Presentation: Cephalic    Fetal Description: carrasquillo    Fetal Position: Right Occiput Anterior    Weight: 3135 g   Length: 53.3 cm   Head circumference: 34 cm   Chest circumference: 32.5 cm   Abdominal girth: 31.5 cm     Apgar - One Minute: 8    Apgar - Five Minutes: 8    Umbilical Cord: 3 vessels present    Specimens: none           Complications:  none           Cord Blood Results:   Information for the patient's :  Rebecca Monroe Pending [[id number]]   No results found for: PCTABR, ABORH, PCTDIG, BILI, ABORH, ABORHEXT     Prenatal Labs:     Lab Results   Component Value Date/Time    ABO/Rh(D) A POSITIVE 2020 05:00 PM    HBsAg, External None detected 2018    HIV, External Non Reactive 2018    Rubella, External Non-immune 2018    Gonorrhea, External negative 2018    Chlamydia, External negative 2018    GrBStrep, External negative 2020        Attending Attestation: I was present and scrubbed for the entire procedure    Signed By:  Tammy Simons MD     2020

## 2020-09-26 NOTE — PROGRESS NOTES
1150  TRANSFER - IN REPORT:    Verbal report received from Tanya Albright RN (name) on Marney Scale  being received from Labor and Delivery (unit) for routine progression of care      Report consisted of patients Situation, Background, Assessment and   Recommendations(SBAR). Information from the following report(s) SBAR, Kardex, Intake/Output, MAR and Recent Results was reviewed with the receiving nurse. Opportunity for questions and clarification was provided. Assessment completed upon patients arrival to unit and care assumed. 1320  Completed assessment, no further needs at this time. 1535  Rounded on patient, no further needs at this time. 1640  Rounded on patient and gave PRN pain medication, no further needs at this time. 1805  Reassessed pain, no further needs at this time. 1905  Bedside and Verbal shift change report given to UMANG Vicente RN (oncoming nurse) by ANA PAULA Medrano RN (offgoing nurse). Report included the following information SBAR, Kardex, Intake/Output, MAR and Recent Results.

## 2020-09-26 NOTE — PROGRESS NOTES
Pt oriented to room, use of call light, use of white communication board. Pain scale explained , [facility] Medications List provided to pt- 1st dose IV LR, epidural fentanyl, IV pitocin explained to pt. Provided with yellow non slip socks.  Pt verbalizes understanding of, and has no further questions

## 2020-09-26 NOTE — ANESTHESIA PROCEDURE NOTES
Epidural Block    Start time: 9/25/2020 8:18 PM  End time: 9/25/2020 8:27 PM  Performed by: Maricel Rollins MD  Authorized by: Maricel Rollins MD     Pre-Procedure  Indication: labor epidural    Preanesthetic Checklist: patient identified, risks and benefits discussed, anesthesia consent, patient being monitored, timeout performed and anesthesia consent      Epidural:   Patient position:  Seated  Prep region:  Lumbar  Prep: Chlorhexidine    Location:  L3-4    Needle and Epidural Catheter:   Needle Type:  Tuohy  Needle Gauge:  17 G  Injection Technique:  Loss of resistance using air  Attempts:  1  Catheter Size:  20 G  Catheter at Skin Depth (cm):  13  Events: no blood with aspiration, no cerebrospinal fluid with aspiration, no paresthesia and negative aspiration test    Test Dose:  Negative and lidocaine 1.5% w/ epi    Assessment:   Catheter Secured:  Tegaderm and tape  Insertion:  Uncomplicated  Patient tolerance:  Patient tolerated the procedure well with no immediate complications

## 2020-09-27 VITALS
DIASTOLIC BLOOD PRESSURE: 80 MMHG | BODY MASS INDEX: 37.36 KG/M2 | TEMPERATURE: 98.1 F | HEIGHT: 65 IN | HEART RATE: 76 BPM | OXYGEN SATURATION: 98 % | RESPIRATION RATE: 17 BRPM | SYSTOLIC BLOOD PRESSURE: 117 MMHG

## 2020-09-27 LAB
HCT VFR BLD AUTO: 34.5 % (ref 35–45)
HGB BLD-MCNC: 10.9 G/DL (ref 12–16)

## 2020-09-27 PROCEDURE — 85018 HEMOGLOBIN: CPT

## 2020-09-27 PROCEDURE — 74011250636 HC RX REV CODE- 250/636: Performed by: STUDENT IN AN ORGANIZED HEALTH CARE EDUCATION/TRAINING PROGRAM

## 2020-09-27 PROCEDURE — 85014 HEMATOCRIT: CPT

## 2020-09-27 PROCEDURE — 36415 COLL VENOUS BLD VENIPUNCTURE: CPT

## 2020-09-27 PROCEDURE — 74011250637 HC RX REV CODE- 250/637: Performed by: STUDENT IN AN ORGANIZED HEALTH CARE EDUCATION/TRAINING PROGRAM

## 2020-09-27 PROCEDURE — 90716 VAR VACCINE LIVE SUBQ: CPT | Performed by: STUDENT IN AN ORGANIZED HEALTH CARE EDUCATION/TRAINING PROGRAM

## 2020-09-27 RX ORDER — AMOXICILLIN 250 MG
1 CAPSULE ORAL
Qty: 60 TAB | Refills: 0 | Status: SHIPPED | OUTPATIENT
Start: 2020-09-27 | End: 2022-08-24

## 2020-09-27 RX ORDER — IBUPROFEN 800 MG/1
800 TABLET ORAL
Qty: 30 TAB | Refills: 0 | Status: SHIPPED | OUTPATIENT
Start: 2020-09-27 | End: 2022-08-24

## 2020-09-27 RX ORDER — LANOLIN ALCOHOL/MO/W.PET/CERES
325 CREAM (GRAM) TOPICAL
Qty: 60 TAB | Refills: 3 | Status: SHIPPED | OUTPATIENT
Start: 2020-09-27 | End: 2022-08-24

## 2020-09-27 RX ADMIN — IBUPROFEN 800 MG: 400 TABLET, FILM COATED ORAL at 07:50

## 2020-09-27 RX ADMIN — VARICELLA VIRUS VACCINE LIVE 0.5 ML: 1350 INJECTION, POWDER, LYOPHILIZED, FOR SUSPENSION SUBCUTANEOUS at 12:33

## 2020-09-27 NOTE — DISCHARGE SUMMARY
Discharge Summary    Admit date: 2020  Discharge date:      Name: Lucero Correia   Medical Record Number: [id number]   YOB: 1995    Postoperative Diagnosis:     Procedure:     Significant admission findings ( see H&P)    Hospital course: Patient was admitted for SROM and augmented. Uncomplicated . Baby weight and Apgars are shown below. Procedure was uneventful, with no obvious Complications. Postpartum course was uneventful, with no significant fever, and vitals normal.  She mobilized well, didbreast-feed. She was discharged home on the 2 day post partum. She was asked to see her doctor in the office in 4-6 weeks. She had good support at home, and will call if she has any problems before she sees us in the office.       Anesthesia: Epidural    Birth Information:   Weight: 3135 g   Length: 53.3 cm   Head circumference: 34 cm   Chest circumference: 32.5 cm   Abdominal girth: 31.5 cm   Apgar - One Minute: 8  Apgar - Five Minutes: 8    Estimated Blood Loss: 150    Relevant Lab:     ABO/Rh(D)   Date Value Ref Range Status   2020 A POSITIVE  Final     ABO,Rh   Date Value Ref Range Status   2018 A  Positive   Final     WBC   Date Value Ref Range Status   2020 11.8 4.6 - 13.2 K/uL Final   2020 10.2 4.0 - 11.0 K/uL Final     HGB   Date Value Ref Range Status   2020 10.9 (L) 12.0 - 16.0 g/dL Final   2020 11.8 (L) 12.0 - 16.0 g/dL Final     HCT   Date Value Ref Range Status   2020 34.5 (L) 35.0 - 45.0 % Final   2020 35.3 35.0 - 45.0 % Final     PLATELET   Date Value Ref Range Status   2020 272 135 - 420 K/uL Final   2020 314 140 - 440 K/uL Final     Hgb, External   Date Value Ref Range Status   2018 11.3  Final   2018 11.9  Final     Hct, External   Date Value Ref Range Status   2018 36.0  Final     Platelet cnt., External   Date Value Ref Range Status   2018 363  Final Lab Results   Component Value Date/Time    Rubella, External Non-immune 07/03/2018    GrBStrep, External negative 09/01/2020       Current Discharge Medication List      START taking these medications    Details   ibuprofen (MOTRIN) 800 mg tablet Take 1 Tab by mouth every eight (8) hours as needed for Pain. Qty: 30 Tab, Refills: 0      senna-docusate (PERICOLACE) 8.6-50 mg per tablet Take 1 Tab by mouth two (2) times daily as needed for Constipation. Qty: 60 Tab, Refills: 0      ferrous sulfate (Iron) 325 mg (65 mg iron) tablet Take 1 Tab by mouth Daily (before breakfast). Qty: 60 Tab, Refills: 3         CONTINUE these medications which have NOT CHANGED    Details   sertraline (ZOLOFT) 50 mg tablet TAKE 1/2 TABLET BY MOUTH DAILY FOR 4 DAYS, THEN TAKE 1 TABLET DAILY STARTING ON DAY 5  Qty: 30 Tab, Refills: 1    Associated Diagnoses: Supervision of high risk pregnancy in third trimester; Mixed obsessional thoughts and acts; Anxiety      diphenhydrAMINE (BenadryL) 25 mg capsule Take 25 mg by mouth every six (6) hours as needed. PNV Comb #2-Iron-FA-Omega 3 29-1-400 mg cmpk Take  by mouth.               Signed: Nick Sánchez MD      September 27, 2020

## 2020-09-27 NOTE — PROGRESS NOTES
Problem: Patient Education: Go to Patient Education Activity  Goal: Patient/Family Education  Outcome: Resolved/Met     Problem: Vaginal Delivery: Postpartum Day 1  Goal: Activity/Safety  Outcome: Resolved/Met  Goal: Consults, if ordered  Outcome: Resolved/Met  Goal: Diagnostic Test/Procedures  Outcome: Resolved/Met  Goal: Nutrition/Diet  Outcome: Resolved/Met  Goal: Discharge Planning  Outcome: Resolved/Met  Goal: Medications  Outcome: Resolved/Met  Goal: Treatments/Interventions/Procedures  Outcome: Resolved/Met  Goal: Psychosocial  Outcome: Resolved/Met  Goal: *Vital signs within defined limits  Outcome: Resolved/Met  Goal: *Labs within defined limits  Outcome: Resolved/Met  Goal: *Hemodynamically stable  Outcome: Resolved/Met  Goal: *Optimal pain control at patient's stated goal  Outcome: Resolved/Met  Goal: *Participates in infant care  Outcome: Resolved/Met  Goal: *Demonstrates progressive activity  Outcome: Resolved/Met  Goal: *Performs self perineal care  Outcome: Resolved/Met  Goal: *Appropriate parent-infant bonding  Outcome: Resolved/Met  Goal: *Tolerating diet  Outcome: Resolved/Met  Goal: *Performs self breast care  Outcome: Resolved/Met     Problem: Vaginal Delivery: Postpartum 2  Goal: Activity/Safety  Outcome: Resolved/Met  Goal: Consults, if ordered  Outcome: Resolved/Met  Goal: Nutrition/Diet  Outcome: Resolved/Met  Goal: Discharge Planning  Outcome: Resolved/Met  Goal: Medications  Outcome: Resolved/Met  Goal: Treatments/Interventions/Procedures  Outcome: Resolved/Met  Goal: Psychosocial  Outcome: Resolved/Met     Problem: Vaginal Delivery: Discharge Outcomes  Goal: *Verbalizes name, dosage, time, side effects, and number of days to continue medications  Outcome: Resolved/Met  Goal: *Describes available resources and support systems  Outcome: Resolved/Met  Goal: *No signs and symptoms of infection  Outcome: Resolved/Met  Goal: *Birth certificate information completed  Outcome: Resolved/Met  Goal: *Received and verbalizes understanding of discharge plan and instructions  Outcome: Resolved/Met  Goal: *Vital signs within defined limits  Outcome: Resolved/Met  Goal: *Labs within defined limits  Outcome: Resolved/Met  Goal: *Hemodynamically stable  Outcome: Resolved/Met  Goal: *Optimal pain control at patient's stated goal  Outcome: Resolved/Met  Goal: *Participates in infant care  Outcome: Resolved/Met  Goal: *Demonstrates progressive activity  Outcome: Resolved/Met  Goal: *Appropriate parent-infant bonding  Outcome: Resolved/Met  Goal: *Tolerating diet  Outcome: Resolved/Met

## 2020-09-27 NOTE — DISCHARGE INSTRUCTIONS
Patient Education        Learning About Birth Control After Childbirth  What is birth control? Birth control is any method used to prevent pregnancy. Another word for birth control is contraception. Wait until you're healed (about 4 to 6 weeks) before you have sexual intercourse. If you have sex without birth control, there is a chance that you could get pregnant. This is true even if you haven't started having periods again. Even if you breastfeed, you can still get pregnant. Most experts suggest waiting at least 18 months to get pregnant again to reduce risks for you and the baby. There may be reasons for you to get pregnant sooner, so talk with your doctor about the risks and benefits. The only sure way to prevent another pregnancy is to not have sex. But finding a good method of birth control that you are comfortable with can help you avoid an unplanned pregnancy. Your doctor can help you choose the birth control method that is right for you. What are the types of birth control? · Long-acting reversible contraception (LARC) is the most effective reversible method you can use to prevent pregnancy. If you decide you want to get pregnant, you can have them removed. LARCs are implants and intrauterine devices (IUDs). While they are being used, they usually prevent pregnancy for years. ? Implants are placed under the skin of the arm. This can be done right after you give birth. They release the hormone progestin and prevent pregnancy for about 3 years. ? IUDs are placed in the uterus by a doctor. This can be done right after you give birth, if you and your doctor discuss it beforehand. Or it can be done at a doctor visit later. There are two main types of IUDs--the copper IUD and the hormonal IUD. The hormonal IUD releases progestin. IUDs prevent pregnancy for 3 to 10 years, depending on the type. · Hormonal methods are very good at preventing pregnancy.  Combination birth control pills (\"the pill\"), skin patches, and vaginal rings release the hormones estrogen and progestin. Depo-Provera is a shot you get every 3 months. Shots, mini-pills, IUDs, and implants release progestin only. It's best to use progestin-only options in the first few weeks after giving birth. · Barrier methods don't prevent pregnancy as well as implants, IUDs, or hormonal methods do. Barrier methods include condoms, diaphragms, and cervical caps. You must use barrier methods every time you have sex. If you had a diaphragm or cervical cap before you got pregnant, talk to your doctor to see if you need a different size. Condoms can be used anytime after you give birth. · Natural family planning is also known as fertility awareness or the rhythm method. It can work if you and your partner are very careful and you have a regular ovulation cycle. But it doesn't work better than other birth control methods. You will need to keep good records so you know when you are most likely to become pregnant. And during those times, you will need to use a barrier method or not have sex. · Permanent birth control (sterilization) gives you lasting protection against pregnancy. A man can have a vasectomy. A woman can have her tubes tied (tubal ligation). But this is only a good choice if you are sure that you don't want any more children. · Emergency contraception is a backup method to prevent pregnancy if you didn't use birth control or if a condom breaks. The most effective emergency contraception is prescribed by a doctor. This includes the copper IUD (inserted by a doctor) or a prescription pill. You can also get emergency contraceptive pills without a prescription at most drugstores. How can you get birth control? · You can buy:  ? Condoms and spermicides without a prescription in drugstores, online, and in many grocery stores. ? Some forms of emergency contraception without a prescription at most drugstores.   · You need to see a doctor or visit family planning clinic to:  ? Get a prescription for birth control pills and other methods that use hormones. ? Have an implant or IUD inserted, including the type of IUD used for emergency contraception. ? Get a hormone shot. ? Get a prescription for a diaphragm or cervical cap. ? Get a prescription for certain kinds of emergency contraception. Follow-up care is a key part of your treatment and safety. Be sure to make and go to all appointments, and call your doctor if you are having problems. It's also a good idea to know your test results and keep a list of the medicines you take. Where can you learn more? Go to http://www.gray.com/  Enter [id number] in the search box to learn more about \"Learning About Birth Control After Childbirth. \"  Current as of: February 11, 2020               Content Version: 12.6  © 4395-6505 [employer]. Care instructions adapted under license by [employer] (which disclaims liability or warranty for this information). If you have questions about a medical condition or this instruction, always ask your healthcare professional. [place] [address] any warranty or liability for your use of this information. POST DELIVERY DISCHARGE INSTRUCTIONS    Name: Winifred Coy  YOB: 1995  Primary Diagnosis: Active Problems:    IUP (intrauterine pregnancy), incidental (9/25/2020)        General:     Diet/Diet Restrictions:  Eight 8-ounce glasses of fluid daily (water, juices); avoid excessive caffeine intake. Meals/snacks as desired which are high in fiber and carbohydrates and low in fat and cholesterol. Physical Activity / Restrictions / Safety:     Avoid heavy lifting, no more that 8 lbs. For 2-3 weeks; limit use of stairs to 2 times daily for the first week home. No driving for one week. Avoid intercourse 4-6 weeks, no douching or tampon use.  Check with obstetrician before starting or resuming an exercise program.         Discharge Instructions/Special Treatment/Home Care Needs:     Continue prenatal vitamins. Continue to use squirt bottle with warm water on your episiotomy after each bathroom use until bleeding stops. Call your doctor for the following:     Fever over 100.4 degrees by mouth. Vaginal bleeding heavier than a normal menstrual period or clot larger than a golf ball. Red streaks or increased swelling of legs, painful red streaks on your breast.  Painful urination, constipation and increased pain or swelling or discharge with your incision. If you feel extremely anxious or overwhelmed. If you have thoughts of harming yourself and/or your baby. Pain Management:     Pain Management:   Take Acetaminophen (Tylenol) or Ibuprofen (Advil, Motrin), as directed for pain. Use a warm Sitz bath 3 times daily to relieve episiotomy or hemorrhoidal discomfort. For hemorrhoidal discomfort, use Tucks and Anusol cream as needed and directed. Follow-Up Care: These are general instructions for a healthy lifestyle:    No smoking/ No tobacco products/ Avoid exposure to second hand smoke    Surgeon General's Warning:  Quitting smoking now greatly reduces serious risk to your health. Obesity, smoking, and sedentary lifestyle greatly increases your risk for illness    A healthy diet, regular physical exercise & weight monitoring are important for maintaining a healthy lifestyle    Recognize signs and symptoms of STROKE:    F-face looks uneven    A-arms unable to move or move unevenly    S-speech slurred or non-existent    T-time-call 911 as soon as signs and symptoms begin-DO NOT go       Back to bed or wait to see if you get better-TIME IS BRAIN.

## 2020-09-27 NOTE — PROGRESS NOTES
9092  Bedside and Verbal shift change report given to ANA PAULA Medrano RN (oncoming nurse) by UMANG Condon RN (offgoing nurse). Report included the following information SBAR, Kardex, Intake/Output, MAR and Recent Results. 0930  Completed assessment, no further needs at this time. 1230  Gave varicella vaccine. Completed quick disclosure, AVS, and education. Patient verbalized understanding and had no questions. Patient e-signed. Patient ready for discharge. 1301  Patient discharged home.

## 2020-09-27 NOTE — ANESTHESIA POSTPROCEDURE EVALUATION
9/26/2020  9:04 PM    Laboring Epidural Follow-up Note       Referring physician: Margie Lieberman, *   Patient status post vaginal delivery with labor epidural.      Visit Vitals  /69 (BP 1 Location: Right arm, BP Patient Position: At rest)   Pulse 84   Temp 37.2 °C (98.9 °F)   Resp 16   Ht 5' 5\" (1.651 m)   LMP 12/20/2019 (Approximate)   SpO2 97%   Breastfeeding Yes   BMI 37.36 kg/m²       Patient ambulating and voiding without difficulty. Patient denies headache. Patient denies backache.     Aj Bassett CRNA

## 2020-09-27 NOTE — PROGRESS NOTES
Problem: Patient Education: Go to Patient Education Activity  Goal: Patient/Family Education  Outcome: Progressing Towards Goal     Problem: Vaginal Delivery: Postpartum Day 1  Goal: Activity/Safety  Outcome: Progressing Towards Goal  Goal: Consults, if ordered  Outcome: Progressing Towards Goal  Goal: Diagnostic Test/Procedures  Outcome: Progressing Towards Goal  Goal: Nutrition/Diet  Outcome: Progressing Towards Goal  Goal: Discharge Planning  Outcome: Progressing Towards Goal  Goal: Medications  Outcome: Progressing Towards Goal  Goal: Treatments/Interventions/Procedures  Outcome: Progressing Towards Goal  Goal: Psychosocial  Outcome: Progressing Towards Goal  Goal: *Vital signs within defined limits  Outcome: Progressing Towards Goal  Goal: *Labs within defined limits  Outcome: Progressing Towards Goal  Goal: *Hemodynamically stable  Outcome: Progressing Towards Goal  Goal: *Optimal pain control at patient's stated goal  Outcome: Progressing Towards Goal  Goal: *Participates in infant care  Outcome: Progressing Towards Goal  Goal: *Demonstrates progressive activity  Outcome: Progressing Towards Goal  Goal: *Performs self perineal care  Outcome: Progressing Towards Goal  Goal: *Appropriate parent-infant bonding  Outcome: Progressing Towards Goal  Goal: *Tolerating diet  Outcome: Progressing Towards Goal  Goal: *Performs self breast care  Outcome: Progressing Towards Goal     Problem: Vaginal Delivery: Postpartum 2  Goal: Activity/Safety  Outcome: Progressing Towards Goal  Goal: Consults, if ordered  Outcome: Progressing Towards Goal  Goal: Nutrition/Diet  Outcome: Progressing Towards Goal  Goal: Discharge Planning  Outcome: Progressing Towards Goal  Goal: Medications  Outcome: Progressing Towards Goal  Goal: Treatments/Interventions/Procedures  Outcome: Progressing Towards Goal  Goal: Psychosocial  Outcome: Progressing Towards Goal     Problem: Vaginal Delivery: Discharge Outcomes  Goal: *Verbalizes name, dosage, time, side effects, and number of days to continue medications  Outcome: Progressing Towards Goal  Goal: *Describes available resources and support systems  Outcome: Progressing Towards Goal  Goal: *No signs and symptoms of infection  Outcome: Progressing Towards Goal  Goal: *Birth certificate information completed  Outcome: Progressing Towards Goal  Goal: *Received and verbalizes understanding of discharge plan and instructions  Outcome: Progressing Towards Goal  Goal: *Vital signs within defined limits  Outcome: Progressing Towards Goal  Goal: *Labs within defined limits  Outcome: Progressing Towards Goal  Goal: *Hemodynamically stable  Outcome: Progressing Towards Goal  Goal: *Optimal pain control at patient's stated goal  Outcome: Progressing Towards Goal  Goal: *Participates in infant care  Outcome: Progressing Towards Goal  Goal: *Demonstrates progressive activity  Outcome: Progressing Towards Goal  Goal: *Appropriate parent-infant bonding  Outcome: Progressing Towards Goal  Goal: *Tolerating diet  Outcome: Progressing Towards Goal 05-Mar-2017 15:53

## 2020-11-05 PROBLEM — O36.62X0 EXCESSIVE FETAL GROWTH AFFECTING MANAGEMENT OF PREGNANCY IN SECOND TRIMESTER: Status: RESOLVED | Noted: 2020-05-15 | Resolved: 2020-11-05

## 2022-03-18 PROBLEM — O99.211 OBESITY AFFECTING PREGNANCY IN FIRST TRIMESTER: Status: ACTIVE | Noted: 2018-07-03

## 2022-03-18 PROBLEM — Z28.39 MATERNAL VARICELLA, NON-IMMUNE: Status: ACTIVE | Noted: 2018-07-07

## 2022-03-18 PROBLEM — O09.899 MATERNAL VARICELLA, NON-IMMUNE: Status: ACTIVE | Noted: 2018-07-07

## 2022-03-18 PROBLEM — O09.899 HISTORY OF PRETERM DELIVERY, CURRENTLY PREGNANT: Status: ACTIVE | Noted: 2020-03-04

## 2022-03-19 PROBLEM — O36.8190 DECREASED FETAL MOVEMENT: Status: ACTIVE | Noted: 2020-07-12

## 2022-03-19 PROBLEM — Z33.1 IUP (INTRAUTERINE PREGNANCY), INCIDENTAL: Status: ACTIVE | Noted: 2020-09-25

## 2022-03-19 PROBLEM — Z87.19 HISTORY OF CHOLESTASIS DURING PREGNANCY: Status: ACTIVE | Noted: 2020-03-09

## 2022-03-19 PROBLEM — Z87.59 HISTORY OF CHOLESTASIS DURING PREGNANCY: Status: ACTIVE | Noted: 2020-03-09

## 2022-03-20 PROBLEM — F42.9 OCD (OBSESSIVE COMPULSIVE DISORDER): Status: ACTIVE | Noted: 2020-07-01

## 2022-06-21 NOTE — DISCHARGE INSTRUCTIONS
DISCHARGE SUMMARY from Nurse    PATIENT INSTRUCTIONS:    After general anesthesia or intravenous sedation, for 24 hours or while taking prescription Narcotics:  · Limit your activities  · Do not drive and operate hazardous machinery  · Do not make important personal or business decisions  · Do  not drink alcoholic beverages  · If you have not urinated within 8 hours after discharge, please contact your surgeon on call. Report the following to your surgeon:  · Excessive pain, swelling, redness or odor of or around the surgical area  · Temperature over 100.5  · Nausea and vomiting lasting longer than 4 hours or if unable to take medications  · Any signs of decreased circulation or nerve impairment to extremity: change in color, persistent  numbness, tingling, coldness or increase pain  · Any questions    What to do at Home:  Recommended activity: Activity as tolerated,     If you experience any of the following symptoms contractions 5 minutes apart, leaking of blood or fluid from your vagina, please follow up with labor & delivery and call your OB provider. *  Please give a list of your current medications to your Primary Care Provider. *  Please update this list whenever your medications are discontinued, doses are      changed, or new medications (including over-the-counter products) are added. *  Please carry medication information at all times in case of emergency situations. These are general instructions for a healthy lifestyle:    No smoking/ No tobacco products/ Avoid exposure to second hand smoke  Surgeon General's Warning:  Quitting smoking now greatly reduces serious risk to your health.     Obesity, smoking, and sedentary lifestyle greatly increases your risk for illness    A healthy diet, regular physical exercise & weight monitoring are important for maintaining a healthy lifestyle    You may be retaining fluid if you have a history of heart failure or if you experience any of the following symptoms:  Weight gain of 3 pounds or more overnight or 5 pounds in a week, increased swelling in our hands or feet or shortness of breath while lying flat in bed. Please call your doctor as soon as you notice any of these symptoms; do not wait until your next office visit. Recognize signs and symptoms of STROKE:    F-face looks uneven    A-arms unable to move or move unevenly    S-speech slurred or non-existent    T-time-call 911 as soon as signs and symptoms begin-DO NOT go       Back to bed or wait to see if you get better-TIME IS BRAIN. Warning Signs of HEART ATTACK     Call 911 if you have these symptoms:   Chest discomfort. Most heart attacks involve discomfort in the center of the chest that lasts more than a few minutes, or that goes away and comes back. It can feel like uncomfortable pressure, squeezing, fullness, or pain.  Discomfort in other areas of the upper body. Symptoms can include pain or discomfort in one or both arms, the back, neck, jaw, or stomach.  Shortness of breath with or without chest discomfort.  Other signs may include breaking out in a cold sweat, nausea, or lightheadedness. Don't wait more than five minutes to call 911 - MINUTES MATTER! Fast action can save your life. Calling 911 is almost always the fastest way to get lifesaving treatment. Emergency Medical Services staff can begin treatment when they arrive -- up to an hour sooner than if someone gets to the hospital by car. The discharge information has been reviewed with the patient. The patient verbalized understanding. Discharge medications reviewed with the patient and appropriate educational materials and side effects teaching were provided.   ___________________________________________________________________________________________________________________________________ Negative

## 2022-08-23 ENCOUNTER — HOSPITAL ENCOUNTER (INPATIENT)
Age: 27
LOS: 1 days | Discharge: HOME | DRG: 560 | End: 2022-08-24
Attending: STUDENT IN AN ORGANIZED HEALTH CARE EDUCATION/TRAINING PROGRAM | Admitting: STUDENT IN AN ORGANIZED HEALTH CARE EDUCATION/TRAINING PROGRAM
Payer: MEDICAID

## 2022-08-23 PROBLEM — Z33.1 IUP (INTRAUTERINE PREGNANCY), INCIDENTAL: Status: ACTIVE | Noted: 2022-08-23

## 2022-08-23 PROBLEM — Z33.1 IUP (INTRAUTERINE PREGNANCY), INCIDENTAL: Status: RESOLVED | Noted: 2020-09-25 | Resolved: 2022-08-23

## 2022-08-23 PROBLEM — O99.211 OBESITY AFFECTING PREGNANCY IN FIRST TRIMESTER: Status: RESOLVED | Noted: 2018-07-03 | Resolved: 2022-08-23

## 2022-08-23 PROBLEM — O09.899 HISTORY OF PRETERM DELIVERY, CURRENTLY PREGNANT: Status: RESOLVED | Noted: 2020-03-04 | Resolved: 2022-08-23

## 2022-08-23 PROBLEM — O36.8190 DECREASED FETAL MOVEMENT: Status: RESOLVED | Noted: 2020-07-12 | Resolved: 2022-08-23

## 2022-08-23 LAB
ABO + RH BLD: NORMAL
BASOPHILS # BLD: 0 K/UL (ref 0–0.1)
BASOPHILS NFR BLD: 0 % (ref 0–2)
BLOOD GROUP ANTIBODIES SERPL: NORMAL
DIFFERENTIAL METHOD BLD: ABNORMAL
EOSINOPHIL # BLD: 0.1 K/UL (ref 0–0.4)
EOSINOPHIL NFR BLD: 1 % (ref 0–5)
ERYTHROCYTE [DISTWIDTH] IN BLOOD BY AUTOMATED COUNT: 13.1 % (ref 11.6–14.5)
HCT VFR BLD AUTO: 38.9 % (ref 35–45)
HGB BLD-MCNC: 12.8 G/DL (ref 12–16)
IMM GRANULOCYTES # BLD AUTO: 0.1 K/UL (ref 0–0.04)
IMM GRANULOCYTES NFR BLD AUTO: 1 % (ref 0–0.5)
LYMPHOCYTES # BLD: 2.6 K/UL (ref 0.9–3.6)
LYMPHOCYTES NFR BLD: 17 % (ref 21–52)
MCH RBC QN AUTO: 29 PG (ref 24–34)
MCHC RBC AUTO-ENTMCNC: 32.9 G/DL (ref 31–37)
MCV RBC AUTO: 88.2 FL (ref 78–100)
MONOCYTES # BLD: 0.8 K/UL (ref 0.05–1.2)
MONOCYTES NFR BLD: 5 % (ref 3–10)
NEUTS SEG # BLD: 11.6 K/UL (ref 1.8–8)
NEUTS SEG NFR BLD: 76 % (ref 40–73)
NRBC # BLD: 0 K/UL (ref 0–0.01)
NRBC BLD-RTO: 0 PER 100 WBC
PLATELET # BLD AUTO: 294 K/UL (ref 135–420)
PMV BLD AUTO: 10.8 FL (ref 9.2–11.8)
RBC # BLD AUTO: 4.41 M/UL (ref 4.2–5.3)
SPECIMEN EXP DATE BLD: NORMAL
WBC # BLD AUTO: 15.2 K/UL (ref 4.6–13.2)

## 2022-08-23 PROCEDURE — 74011250637 HC RX REV CODE- 250/637: Performed by: MIDWIFE

## 2022-08-23 PROCEDURE — 75410000000 HC DELIVERY VAGINAL/SINGLE

## 2022-08-23 PROCEDURE — 65270000029 HC RM PRIVATE

## 2022-08-23 PROCEDURE — 75410000002 HC LABOR FEE PER 1 HR

## 2022-08-23 PROCEDURE — 74011000250 HC RX REV CODE- 250: Performed by: MIDWIFE

## 2022-08-23 PROCEDURE — 74011250636 HC RX REV CODE- 250/636: Performed by: MIDWIFE

## 2022-08-23 PROCEDURE — 75410000003 HC RECOV DEL/VAG/CSECN EA 0.5 HR

## 2022-08-23 PROCEDURE — 86900 BLOOD TYPING SEROLOGIC ABO: CPT

## 2022-08-23 PROCEDURE — 74011250636 HC RX REV CODE- 250/636

## 2022-08-23 PROCEDURE — 85025 COMPLETE CBC W/AUTO DIFF WBC: CPT

## 2022-08-23 PROCEDURE — 0UQMXZZ REPAIR VULVA, EXTERNAL APPROACH: ICD-10-PCS | Performed by: OBSTETRICS & GYNECOLOGY

## 2022-08-23 PROCEDURE — 10907ZC DRAINAGE OF AMNIOTIC FLUID, THERAPEUTIC FROM PRODUCTS OF CONCEPTION, VIA NATURAL OR ARTIFICIAL OPENING: ICD-10-PCS | Performed by: OBSTETRICS & GYNECOLOGY

## 2022-08-23 RX ORDER — OXYTOCIN/RINGER'S LACTATE 30/500 ML
10 PLASTIC BAG, INJECTION (ML) INTRAVENOUS AS NEEDED
Status: COMPLETED | OUTPATIENT
Start: 2022-08-23 | End: 2022-08-23

## 2022-08-23 RX ORDER — NALBUPHINE HYDROCHLORIDE 10 MG/ML
10 INJECTION, SOLUTION INTRAMUSCULAR; INTRAVENOUS; SUBCUTANEOUS
Status: DISCONTINUED | OUTPATIENT
Start: 2022-08-23 | End: 2022-08-23 | Stop reason: HOSPADM

## 2022-08-23 RX ORDER — LANOLIN ALCOHOL/MO/W.PET/CERES
3 CREAM (GRAM) TOPICAL
Status: DISCONTINUED | OUTPATIENT
Start: 2022-08-23 | End: 2022-08-24 | Stop reason: HOSPADM

## 2022-08-23 RX ORDER — FENTANYL CITRATE 50 UG/ML
INJECTION, SOLUTION INTRAMUSCULAR; INTRAVENOUS
Status: COMPLETED
Start: 2022-08-23 | End: 2022-08-23

## 2022-08-23 RX ORDER — FENTANYL CITRATE 50 UG/ML
100 INJECTION, SOLUTION INTRAMUSCULAR; INTRAVENOUS ONCE
Status: COMPLETED | OUTPATIENT
Start: 2022-08-23 | End: 2022-08-23

## 2022-08-23 RX ORDER — TERBUTALINE SULFATE 1 MG/ML
0.25 INJECTION SUBCUTANEOUS
Status: DISCONTINUED | OUTPATIENT
Start: 2022-08-23 | End: 2022-08-23 | Stop reason: HOSPADM

## 2022-08-23 RX ORDER — OXYTOCIN/0.9 % SODIUM CHLORIDE 30/500 ML
87.3 PLASTIC BAG, INJECTION (ML) INTRAVENOUS AS NEEDED
Status: COMPLETED | OUTPATIENT
Start: 2022-08-23 | End: 2022-08-23

## 2022-08-23 RX ORDER — OXYCODONE AND ACETAMINOPHEN 5; 325 MG/1; MG/1
2 TABLET ORAL
Status: DISCONTINUED | OUTPATIENT
Start: 2022-08-23 | End: 2022-08-24 | Stop reason: HOSPADM

## 2022-08-23 RX ORDER — HYDROMORPHONE HYDROCHLORIDE 1 MG/ML
1 INJECTION, SOLUTION INTRAMUSCULAR; INTRAVENOUS; SUBCUTANEOUS
Status: DISCONTINUED | OUTPATIENT
Start: 2022-08-23 | End: 2022-08-23 | Stop reason: HOSPADM

## 2022-08-23 RX ORDER — LIDOCAINE HYDROCHLORIDE 10 MG/ML
20 INJECTION, SOLUTION EPIDURAL; INFILTRATION; INTRACAUDAL; PERINEURAL AS NEEDED
Status: COMPLETED | OUTPATIENT
Start: 2022-08-23 | End: 2022-08-23

## 2022-08-23 RX ORDER — PROMETHAZINE HYDROCHLORIDE 25 MG/ML
25 INJECTION, SOLUTION INTRAMUSCULAR; INTRAVENOUS
Status: DISCONTINUED | OUTPATIENT
Start: 2022-08-23 | End: 2022-08-24 | Stop reason: HOSPADM

## 2022-08-23 RX ORDER — MINERAL OIL
30 OIL (ML) ORAL AS NEEDED
Status: COMPLETED | OUTPATIENT
Start: 2022-08-23 | End: 2022-08-23

## 2022-08-23 RX ORDER — SODIUM CHLORIDE, SODIUM LACTATE, POTASSIUM CHLORIDE, CALCIUM CHLORIDE 600; 310; 30; 20 MG/100ML; MG/100ML; MG/100ML; MG/100ML
125 INJECTION, SOLUTION INTRAVENOUS CONTINUOUS
Status: DISCONTINUED | OUTPATIENT
Start: 2022-08-23 | End: 2022-08-23 | Stop reason: HOSPADM

## 2022-08-23 RX ORDER — METHYLERGONOVINE MALEATE 0.2 MG/ML
0.2 INJECTION INTRAVENOUS ONCE
Status: ACTIVE | OUTPATIENT
Start: 2022-08-23 | End: 2022-08-23

## 2022-08-23 RX ORDER — BUTORPHANOL TARTRATE 2 MG/ML
2 INJECTION INTRAMUSCULAR; INTRAVENOUS
Status: DISCONTINUED | OUTPATIENT
Start: 2022-08-23 | End: 2022-08-23 | Stop reason: HOSPADM

## 2022-08-23 RX ORDER — AMOXICILLIN 250 MG
1 CAPSULE ORAL
Status: DISCONTINUED | OUTPATIENT
Start: 2022-08-23 | End: 2022-08-24 | Stop reason: HOSPADM

## 2022-08-23 RX ORDER — ONDANSETRON 2 MG/ML
4 INJECTION INTRAMUSCULAR; INTRAVENOUS
Status: DISCONTINUED | OUTPATIENT
Start: 2022-08-23 | End: 2022-08-23 | Stop reason: HOSPADM

## 2022-08-23 RX ORDER — IBUPROFEN 400 MG/1
800 TABLET ORAL
Status: DISCONTINUED | OUTPATIENT
Start: 2022-08-23 | End: 2022-08-24 | Stop reason: HOSPADM

## 2022-08-23 RX ORDER — ACETAMINOPHEN 325 MG/1
650 TABLET ORAL
Status: DISCONTINUED | OUTPATIENT
Start: 2022-08-23 | End: 2022-08-24 | Stop reason: HOSPADM

## 2022-08-23 RX ORDER — METHYLERGONOVINE MALEATE 0.2 MG/ML
0.2 INJECTION INTRAVENOUS AS NEEDED
Status: COMPLETED | OUTPATIENT
Start: 2022-08-23 | End: 2022-08-23

## 2022-08-23 RX ADMIN — LIDOCAINE HYDROCHLORIDE 5 ML: 10 INJECTION, SOLUTION EPIDURAL; INFILTRATION; INTRACAUDAL; PERINEURAL at 07:40

## 2022-08-23 RX ADMIN — SODIUM CHLORIDE, POTASSIUM CHLORIDE, SODIUM LACTATE AND CALCIUM CHLORIDE 500 ML: 600; 310; 30; 20 INJECTION, SOLUTION INTRAVENOUS at 07:15

## 2022-08-23 RX ADMIN — IBUPROFEN 800 MG: 400 TABLET, FILM COATED ORAL at 22:00

## 2022-08-23 RX ADMIN — FENTANYL CITRATE 100 MCG: 50 INJECTION, SOLUTION INTRAMUSCULAR; INTRAVENOUS at 07:32

## 2022-08-23 RX ADMIN — FENTANYL CITRATE 100 MCG: 50 INJECTION INTRAMUSCULAR; INTRAVENOUS at 07:32

## 2022-08-23 RX ADMIN — Medication 10000 MILLI-UNITS: at 07:27

## 2022-08-23 RX ADMIN — Medication 87.3 MILLI-UNITS/MIN: at 07:40

## 2022-08-23 RX ADMIN — METHYLERGONOVINE MALEATE 0.2 MG: 0.2 INJECTION, SOLUTION INTRAMUSCULAR; INTRAVENOUS at 07:50

## 2022-08-23 RX ADMIN — IBUPROFEN 800 MG: 400 TABLET, FILM COATED ORAL at 13:58

## 2022-08-23 RX ADMIN — MINERAL OIL 30 ML: 1000 SOLUTION ORAL at 07:20

## 2022-08-23 NOTE — PROGRESS NOTES
1930 Bedside and Verbal shift change report given to Kristin Paulson, RN and Mendel Hall, RN (oncoming nurse) by Kofi Owens RN (offgoing nurse). Report included the following information SBAR, Procedure Summary, Intake/Output, MAR, and Recent Results. 2200 Shift assessment completed. VSS. MOB has no questions/concerns at this time    0116 MOB and FOB resting. Infant asleep in bassinet supine. 9367 Reassessment completed. MOB has no further questions/concerns. MOB holding infant. Rohit 121 rounds completed    0715 Bedside and Verbal shift change report given to Denisse Wong RN and Arthur Mancilla RN  (oncoming nurse) by Yesenia Patel RN and Mendel Hall, RN (offgoing nurse). Report included the following information SBAR, Intake/Output, MAR, and Recent Results.

## 2022-08-23 NOTE — PROGRESS NOTES
DELIVERY SUMMARY    Patient:  Trino Epperson    Delivery Type: Vaginal, Spontaneous   Delivery Date: 2022  Delivery Time:  5  AM      Birth Weight:   46g     Sex:  female    Delivery Clinician:  Emely Moser CNM  Gestational Age: 37+11     Presentation: Vertex   Position:  OA to Right Occiput  Anterior      Apgars were 8 and 9       Resuscitation Method: Tactile Stimulation;Suctioning-bulb     Meconium Stained: None    Living Status: Living        Placenta Date/Time:   2022  0729  AM   Placenta Removal: Spontaneous   Placenta Appearance: Normal     Cord Information: 3 Vessels          Disposition of Cord Blood: lab for ABO/DNA  Blood Gases Sent?:  No         Cord pH:  none     Episiotomy: none  Laceration(s):  right periurethral repaired    Estimated Blood Loss (ml): 350     Labor Events  Method:     spontaneous  Augmentation:  AROM  Cervical Ripening:   n/a     Induction - no     Induction Indication - N/A     Induction Method - N/A     Complications - thick meconium     NICU Admit - no     HTN Disorders - none     Diabetes - N/A     Operative Vaginal Delivery - none     Additional Delivery Comments -  viable female infant, OA to WHITNEY,  infant to mother in stable condition for apgars of 8/9, delayed cord clamping for 1 minute until pulsations ceased, cord clamped and cut by FOB and cord blood for ABO/DNA obtained, pitocin infusing for active management of third stage, fentanyl 100 mcg given for pain of delivery,  placenta delivered appearing intact, enriquez, spont.  With 3 vc, fundus firm, vaginal sweep with no products of conception noted/ clots removed - methergine 0.2mg IM given, perineum inspected - intact with right periurethral laceration noted - local anesthesia placed using lidocaine and  repaired with 4-0 vicryl for hemostasis,  ml,  complications: thick meconium;   mother and infant in stable condition

## 2022-08-23 NOTE — PROGRESS NOTES
1310: TRANSFER - IN REPORT:    Verbal report received from VENKAT Moore (name) on [facility]  being received from Labor and delivery (unit) for routine progression of care      Report consisted of patients Situation, Background, Assessment and   Recommendations(SBAR). Information from the following report(s) SBAR, Procedure Summary, Intake/Output, MAR, and Recent Results was reviewed with the receiving nurse. Opportunity for questions and clarification was provided. Assessment completed upon patients arrival to unit and care assumed. 1915: Bedside and verbal shift change report given by Mayte Urban RN  to D. Rubie Mcardle, RN.  Relinquished care of pt at this time

## 2022-08-23 NOTE — H&P
History & Physical    Name: Inga Osgood MRN: [id number]  SSN: [id number]    YOB: 1995  Age: 28 y.o. Sex: female        Subjective:     Estimated Date of Delivery: 22  OB History    Para Term  AB Living   5 2 1 1 2 2   SAB IAB Ectopic Molar Multiple Live Births   2       0 2      # Outcome Date GA Lbr Adi/2nd Weight Sex Delivery Anes PTL Lv   5 Current            4 Term 20 40w0d 08:55 / 00:14 3.135 kg F Vag-Spont EPI N RACHEL      Birth Comments: Naveen Gilbert   3  10/18/18 36w3d 75:11 / 03:38 2.699 kg F Vag-Spont Local, EPIDURAL AN Y RACHEL      Birth Comments: Induced for cholestasis of pregnancy   2 SAB            1 SAB                Ms. Renae is admitted with pregnancy at 38w5d for active labor. Prenatal course was complicated by  LGA baby, obsessive compulsive disorder (Zoloft 150mg daily), obesity, varicella non-immune . Please see prenatal records for details. Past Medical History:   Diagnosis Date    Anemia during pregnancy in third trimester 2018    Cholestasis during pregnancy in third trimester 2018    OCD (obsessive compulsive disorder) 2020     Past Surgical History:   Procedure Laterality Date    HX LAP CHOLECYSTECTOMY  2019    HX WISDOM TEETH EXTRACTION       Social History     Occupational History    Not on file   Tobacco Use    Smoking status: Never    Smokeless tobacco: Never   Substance and Sexual Activity    Alcohol use: No    Drug use: No    Sexual activity: Not Currently     Partners: Male     Family History   Problem Relation Age of Onset    Hypertension Maternal Grandmother     Diabetes Maternal Grandmother     Kidney Disease Maternal Grandmother     Hypertension Maternal Grandfather     Diabetes Maternal Grandfather     Kidney Disease Maternal Grandfather        No Known Allergies  Prior to Admission medications    Medication Sig Start Date End Date Taking?  Authorizing Provider   medroxyPROGESTERone (DEPO-PROVERA) 150 mg/mL injection 1 mL by IntraMUSCular route every three (3) months. 11/5/20   Jo Ann Haider NP   clotrimazole (GYNE-LOTRIMIN) 2 % vaginal cream Apply small amount with fingertips to affected areas twice daily. 11/5/20   Jo Ann Haider NP   sertraline (ZOLOFT) 50 mg tablet TAKE 1/2 TABLET BY MOUTH DAILY FOR 4 DAYS, THEN TAKE 1 TABLET DAILY STARTING ON DAY 5 11/5/20   Jo Ann Haider NP   ibuprofen (MOTRIN) 800 mg tablet Take 1 Tab by mouth every eight (8) hours as needed for Pain. 9/27/20   Michael Lieberman MD   senna-docusate (PERICOLACE) 8.6-50 mg per tablet Take 1 Tab by mouth two (2) times daily as needed for Constipation. 9/27/20   Stephen Lieberman MD   ferrous sulfate (Iron) 325 mg (65 mg iron) tablet Take 1 Tab by mouth Daily (before breakfast). 9/27/20   Michael Lieberman MD   diphenhydrAMINE (BenadryL) 25 mg capsule Take 25 mg by mouth every six (6) hours as needed. Provider, Historical   PNV Comb #2-Iron-FA-Omega 3 29-1-400 mg cmpk Take  by mouth. Provider, Historical        Review of Systems: A comprehensive review of systems was negative except for that written in the HPI. Objective:     Vitals: There were no vitals filed for this visit. Physical Exam:  Patient without distress.   Abdomen: soft, nontender  Baby delivered    Prenatal Labs:   Lab Results   Component Value Date/Time    ABO/Rh(D) A POSITIVE 09/25/2020 05:00 PM    Rubella, External Non-immune 07/03/2018 12:00 AM    GrBStrep, External negative 09/01/2020 12:00 AM    HBsAg, External None detected 07/03/2018 12:00 AM    HIV, External Non Reactive 07/03/2018 12:00 AM    Gonorrhea, External negative 07/03/2018 12:00 AM    Chlamydia, External negative 07/03/2018 12:00 AM    ABO,Rh A  Positive  07/03/2018 12:00 AM         Assessment/Plan:     Active Problems:    Maternal varicella, non-immune (7/7/2018)      Overview: Vaccinate postpartum       Spontaneous vaginal delivery (10/18/2018)      OCD (obsessive compulsive disorder) (7/1/2020)      Overview: Started Zoloft 50 mg on 7/1/2020; counseling information given. Plan: Patient arrived in labor. Delivered by Tl Burrows CNM. See her delivery note. Mother and baby stable at this time. GBS negative.      Signed By:  Cris Ontiveros MD     August 23, 2022

## 2022-08-23 NOTE — PROGRESS NOTES
0715 : Bedside and Verbal shift change report given to [address] [place] [address] (oncoming nurse) by Juan Fonseca RN (offgoing nurse). Report included the following information SBAR, Kardex, Intake/Output, MAR, and Recent Results. 0720 : pushing    0726 : spontaneous delivery of live infant female    80 : Spontaneous delivery of placenta    0732 : fentanyl given for 10/10 pain    0750 : Methergine given for bleeding    0945 : Pt up to bathroom to void    1200 : patient resting in bed, no complaints at this time. 1310 : TRANSFER - OUT REPORT:    Verbal report given to [address] [facility] RN(name) on Suzanne Ricketts  being transferred to Mother Baby(unit) for routine progression of care       Report consisted of patients Situation, Background, Assessment and   Recommendations(SBAR). Information from the following report(s) SBAR, Kardex, Procedure Summary, Intake/Output, MAR, and Recent Results was reviewed with the receiving nurse. Lines:   Peripheral IV 08/23/22 Distal;Posterior;Right Forearm (Active)        Opportunity for questions and clarification was provided.       Patient transported with:   Registered Nurse

## 2022-08-23 NOTE — PROGRESS NOTES
Problem: Vaginal Delivery: Day of Deliver-Laboring  Goal: Off Pathway (Use only if patient is Off Pathway)  Outcome: Progressing Towards Goal  Goal: Activity/Safety  Outcome: Progressing Towards Goal  Goal: Consults, if ordered  Outcome: Progressing Towards Goal  Goal: Diagnostic Test/Procedures  Outcome: Progressing Towards Goal  Goal: Nutrition/Diet  Outcome: Progressing Towards Goal  Goal: Discharge Planning  Outcome: Progressing Towards Goal  Goal: Medications  Outcome: Progressing Towards Goal  Goal: Respiratory  Outcome: Progressing Towards Goal  Goal: Treatments/Interventions/Procedures  Outcome: Progressing Towards Goal  Goal: *Vital signs within defined limits  Outcome: Progressing Towards Goal  Goal: *Labs within defined limits  Outcome: Progressing Towards Goal  Goal: *Hemodynamically stable  Outcome: Progressing Towards Goal  Goal: *Optimal pain control at patient's stated goal  Outcome: Progressing Towards Goal     Problem: Vaginal Delivery: Day of Delivery-Post delivery  Goal: Off Pathway (Use only if patient is Off Pathway)  Outcome: Progressing Towards Goal  Goal: Activity/Safety  Outcome: Progressing Towards Goal  Goal: Consults, if ordered  Outcome: Progressing Towards Goal  Goal: Nutrition/Diet  Outcome: Progressing Towards Goal  Goal: Discharge Planning  Outcome: Progressing Towards Goal  Goal: Medications  Outcome: Progressing Towards Goal  Goal: Treatments/Interventions/Procedures  Outcome: Progressing Towards Goal  Goal: *Vital signs within defined limits  Outcome: Progressing Towards Goal  Goal: *Labs within defined limits  Outcome: Progressing Towards Goal  Goal: *Hemodynamically stable  Outcome: Progressing Towards Goal  Goal: *Optimal pain control at patient's stated goal  Outcome: Progressing Towards Goal  Goal: *Participates in infant care  Outcome: Progressing Towards Goal  Goal: *Demonstrates progressive activity  Outcome: Progressing Towards Goal  Goal: *Tolerating diet  Outcome: Progressing Towards Goal     Problem: Pain  Goal: *Control of Pain  Outcome: Progressing Towards Goal

## 2022-08-23 NOTE — L&D DELIVERY NOTE
Delivery Summary    Patient: Faraz Browne MRN: [id number]  SSN: [id number]    YOB: 1995  Age: 28 y.o. Sex: female       Information for the patient's :  Katt Thacker [[id number]]     Labor Events:    Labor: No    Steroids: None   Cervical Ripening Date/Time:       Cervical Ripening Type: None   Antibiotics During Labor: No   Rupture Identifier: Sac 1    Rupture Date/Time: 2022 7:08 AM   Rupture Type: AROM   Amniotic Fluid Volume: Moderate    Amniotic Fluid Description: Meconium    Amniotic Fluid Odor: None    Induction: None       Induction Date/Time:        Indications for Induction:      Augmentation: AROM   Augmentation Date/Time:  7:08 AM   Indications for Augmentation: Ineffective Contraction Pattern   Labor complications: None       Additional complications:        Delivery Events:  Indications For Episiotomy:     Episiotomy: None   Perineal Laceration(s): None   Repaired:     Periurethral Laceration Location: right    Repaired: Yes   Labial Laceration Location:     Repaired:     Sulcal Laceration Location:     Repaired:     Vaginal Laceration Location:     Repaired:     Cervical Laceration Location:     Repaired:     Repair Suture: Other (See Note)   Number of Repair Packets: 1   Estimated Blood Loss (ml): 350ml   Quantitative Blood Loss (ml)                Delivery Date: 2022    Delivery Time: 7:26 AM  Delivery Type: Vaginal, Spontaneous  Sex:  Female    Gestational Age: 44w7d   Delivery Clinician:  Jesus Crowder  Living Status: Living   Delivery Location: L&D LDR1          APGARS  One minute Five minutes Ten minutes   Skin color: 0   1        Heart rate: 2   2        Grimace: 2   2        Muscle tone: 2   2        Breathin   2        Totals: 8   9            Presentation: Vertex    Position: Right Occiput Anterior  Resuscitation Method:  Suctioning-bulb; Tactile Stimulation;Suctioning-deep     Meconium Stained:  Thick      Cord Information: 3 Vessels  Complications: None  Cord around:    Delayed cord clamping? Yes  Cord clamped date/time:2022  7:27 AM  Disposition of Cord Blood: Lab    Blood Gases Sent?: No    Placenta:  Date/Time: 2022  7:29 AM  Removal: Spontaneous      Appearance: Normal     Arrey Measurements:  Birth Weight: 3.225 kg      Birth Length: 49 cm      Head Circumference: 34 cm      Chest Circumference: 33 cm     Abdominal Girth: 33 cm    Other Providers:   ;KARSON Macario;;;;;Emily SMITH, Obstetrician;Primary Nurse;Primary Arrey Nurse;Nicu Nurse;Neonatologist;Anesthesiologist;Crna;Nurse Practitioner;Midwife;Nursery Nurse         Group B Strep:   Lab Results   Component Value Date/Time    GrBStrep, External negative 2020 12:00 AM     Information for the patient's :  Sammy Sen [[id number]]   No results found for: ABORH, PCTABR, PCTDIG, BILI, ABORHEXT, ABORH   No results for input(s): PCO2CB, PO2CB, HCO3I, SO2I, IBD, PTEMPI, SPECTI, PHICB, ISITE, IDEV, IALLEN in the last 72 hours.

## 2022-08-23 NOTE — PROGRESS NOTES
1386  Pt arrived to L&D with c/o ctx's. Pt taken to triage 3 for assessment. 1388 Pt taken to labor room 1 for admission to labor. IV started, EFM's placed on maternal abdomen. 0708 VINNY Rojas at bedside for assessment, AROM and delivery. 0715 Bedside shift change report given to VENKAT Moralez (oncoming nurse) by Guerry Schaumann, RN (offgoing nurse). Report included the following information SBAR, Kardex, Intake/Output, MAR, Accordion, Recent Results, and Quality Measures.

## 2022-08-24 VITALS
HEIGHT: 65 IN | TEMPERATURE: 97.7 F | HEART RATE: 75 BPM | SYSTOLIC BLOOD PRESSURE: 132 MMHG | RESPIRATION RATE: 18 BRPM | OXYGEN SATURATION: 99 % | BODY MASS INDEX: 32.12 KG/M2 | DIASTOLIC BLOOD PRESSURE: 78 MMHG

## 2022-08-24 PROBLEM — Z87.59 HISTORY OF CHOLESTASIS DURING PREGNANCY: Status: RESOLVED | Noted: 2020-03-09 | Resolved: 2022-08-24

## 2022-08-24 PROBLEM — Z87.19 HISTORY OF CHOLESTASIS DURING PREGNANCY: Status: RESOLVED | Noted: 2020-03-09 | Resolved: 2022-08-24

## 2022-08-24 PROBLEM — Z33.1 IUP (INTRAUTERINE PREGNANCY), INCIDENTAL: Status: RESOLVED | Noted: 2022-08-23 | Resolved: 2022-08-24

## 2022-08-24 LAB
HCT VFR BLD AUTO: 35 % (ref 35–45)
HGB BLD-MCNC: 11.2 G/DL (ref 12–16)

## 2022-08-24 PROCEDURE — 85018 HEMOGLOBIN: CPT

## 2022-08-24 PROCEDURE — 36415 COLL VENOUS BLD VENIPUNCTURE: CPT

## 2022-08-24 PROCEDURE — 74011250636 HC RX REV CODE- 250/636: Performed by: OBSTETRICS & GYNECOLOGY

## 2022-08-24 PROCEDURE — 90716 VAR VACCINE LIVE SUBQ: CPT | Performed by: OBSTETRICS & GYNECOLOGY

## 2022-08-24 RX ORDER — IBUPROFEN 800 MG/1
800 TABLET ORAL
Qty: 30 TABLET | Refills: 1 | Status: SHIPPED | OUTPATIENT
Start: 2022-08-24

## 2022-08-24 RX ADMIN — VARICELLA VIRUS VACCINE LIVE 0.5 ML: 1350 INJECTION, POWDER, LYOPHILIZED, FOR SUSPENSION SUBCUTANEOUS at 11:02

## 2022-08-24 NOTE — LACTATION NOTE
This note was copied from a baby's chart. 56 mom is eating. Will return. 2029  Mom educated on breastfeeding basics--hunger cues, feeding on demand, waking baby if baby sleeps too long between feeds, importance of skin to skin, positioning and latching, risk of pacifier use and supplemental feedings, and importance of rooming in--and use of log sheet. Mom also educated on benefits of breastfeeding for herself and baby. Mom verbalized understanding. No questions at this time. Infant latched and nursing well at this time.

## 2022-08-24 NOTE — PROGRESS NOTES
0715 Bedside and Verbal shift change report given to Lakisha Galeano RN (oncoming nurse) by Rae Hendrickson RN/MÓNICA Lawson RN (offgoing nurse). Report included the following information SBAR, Kardex, Intake/Output, MAR, and Recent Results. Pt educated on pain management, pt denies any pain or needs at this time. 1014 Assessment completed, pt denies any pain or needs at this time. 1102 Varicella vaccine administered in right arm, no adverse reactions observed. Pt provided educational pamphlet. 1213 AVS and discharge teachings reviewed and e-signed, arm bands verified and matching, pt discharged home with baby both in stable condition.

## 2022-08-24 NOTE — DISCHARGE INSTRUCTIONS
Postpartum: Care Instructions  Overview  After childbirth (postpartum period), your body goes through many changes. Some of these changes happen over several weeks. In the hours after delivery, your body will begin to recover from childbirth while it prepares to breastfeed your . You may feel emotional during this time. Your hormones can shift your mood without warning for no clear reason. In the first couple of weeks after childbirth, it's common to have emotions that change from happy to sad. You may find it hard to sleep. You may cry a lot. This is called the \"baby blues. \" These overwhelming emotions often go away within a couple of days or weeks. But it's important to discuss your feelings with your doctor. It's easy to get too tired and overwhelmed during the first weeks after childbirth. Don't try to do too much. Get rest whenever you can, accept help from others, and eat well and drink plenty of fluids. In the first couple of weeks after you give birth, your doctor or midwife may want to check in with you and make a plan for any follow-up care you may need. You will likely have a complete postpartum visit in the first 3 months after delivery. At that time, your doctor or midwife will check on your recovery from childbirth and see how you're doing with your emotions. You may also discuss your concerns or questions. Follow-up care is a key part of your treatment and safety. Be sure to make and go to all appointments, and call your doctor if you are having problems. It's also a good idea to know your test results and keep a list of the medicines you take. How can you care for yourself at home? Sleep or rest when your baby sleeps. Get help with household chores from family or friends, if you can. Don't try to do it all yourself. If you have hemorrhoids or swelling or pain around the opening of your vagina, try using cold and heat.  You can put ice or a cold pack on the area for 10 to 20 minutes at a time. Put a thin cloth between the ice and your skin. Also try sitting in a few inches of warm water (sitz bath) 3 times a day and after bowel movements. Take pain medicines exactly as directed. If the doctor gave you a prescription medicine for pain, take it as prescribed. If you are not taking a prescription pain medicine, ask your doctor if you can take an over-the-counter medicine. Eat more fiber to avoid constipation. Include foods such as whole-grain breads and cereals, raw vegetables, raw and dried fruits, and beans. Drink plenty of fluids. If you have kidney, heart, or liver disease and have to limit fluids, talk with your doctor before you increase the amount of fluids you drink. Do not rinse inside your vagina with fluids (douche). If you have stitches, keep the area clean by pouring or spraying warm water over the area outside your vagina and anus after you use the toilet. Keep a list of questions to ask your doctor or midwife. Your questions might be about:  Changes in your breasts, such as lumps or soreness. When to expect your menstrual period to start again. What form of birth control is best for you. Weight you have put on during the pregnancy. Exercise options. What foods and drinks are best for you, especially if you are breastfeeding. Problems you might be having with breastfeeding. When you can have sex. You may want to talk about lubricants for your vagina. Any feelings of sadness or restlessness that you are having. When should you call for help? Share this information with your partner, family, or a friend. They can help you watch for warning signs. Call 911  anytime you think you may need emergency care. For example, call if:    You have thoughts of harming yourself, your baby, or another person. You passed out (lost consciousness). You have chest pain, are short of breath, or cough up blood. You have a seizure.    Call your doctor now or seek immediate medical care if:    You have signs of hemorrhage (too much bleeding), such as:  Heavy vaginal bleeding. This means that you are soaking through one or more pads in an hour. Or you pass blood clots bigger than an egg. Feeling dizzy or lightheaded, or you feel like you may faint. Feeling so tired or weak that you cannot do your usual activities. A fast or irregular heartbeat. New or worse belly pain. You have signs of infection, such as:  A fever. Vaginal discharge that smells bad. New or worse belly pain. You have symptoms of a blood clot in your leg (called a deep vein thrombosis), such as:  Pain in the calf, back of the knee, thigh, or groin. Redness and swelling in your leg or groin. You have signs of preeclampsia, such as:  Sudden swelling of your face, hands, or feet. New vision problems (such as dimness, blurring, or seeing spots). A severe headache. Watch closely for changes in your health, and be sure to contact your doctor if:    Your vaginal bleeding isn't decreasing. You feel sad, anxious, or hopeless for more than a few days. You are having problems with your breasts or breastfeeding. Where can you learn more? Go to http://www.gray.com/  Enter R221 in the search box to learn more about \"Postpartum: Care Instructions. \"  Current as of: June 16, 2021               Content Version: 13.2  © 1995-8910 [employer]. Care instructions adapted under license by [employer] (which disclaims liability or warranty for this information). If you have questions about a medical condition or this instruction, always ask your healthcare professional. [place] [address] any warranty or liability for your use of this information.

## 2022-08-24 NOTE — DISCHARGE SUMMARY
Obstetrical Discharge Summary     Name: Sarika Medellin MRN: [id number]  SSN: [id number]    YOB: 1995  Age: 28 y.o. Sex: female      Admit Date: 2022    Discharge Date: 2022    Admitting Physician: Chantal Zavaleta MD     Attending Physician:  Chepe Chávez MD     Discharge Diagnoses: s/p  female infant. Information for the patient's :  Aravind Patterson [[id number]]   Delivery of a 3.225 kg female infant via Vaginal, Spontaneous on 2022 at 7:26 AM  by Veronika Alfred. Apgars were 8  and 9 . Additional Diagnoses:   Problem List as of 2022 Date Reviewed: 2022            Codes Class Noted - Resolved    OCD (obsessive compulsive disorder) ICD-10-CM: F42.9  ICD-9-CM: 300.3  2020 - Present    Overview Signed 2020  2:05 PM by LAM Reagan     Started Zoloft 50 mg on 2020; counseling information given. Spontaneous vaginal delivery ICD-10-CM: O80  ICD-9-CM: 010  10/18/2018 - Present        Maternal varicella, non-immune ICD-10-CM: O09.899, Z28.39  ICD-9-CM: V49.89  2018 - Present    Overview Signed 2020  7:55 PM by Dony Levy NP     Vaccinate postpartum              * (Principal) RESOLVED: IUP (intrauterine pregnancy), incidental ICD-10-CM: Z33.1  ICD-9-CM: V22.2  2022 - 2022        RESOLVED: IUP (intrauterine pregnancy), incidental ICD-10-CM: Z33.1  ICD-9-CM: V22.2  2020 - 2022        RESOLVED: Decreased fetal movement ICD-10-CM: X81.4490  ICD-9-CM: 655.70  2020 - 2022        RESOLVED: Excessive fetal growth affecting management of pregnancy in second trimester ICD-10-CM: O36.62X0  ICD-9-CM: 656.63  5/15/2020 - 2020    Overview Addendum 2020  3:32 PM by Helen Martin MD     5/15/20 EFW 99%, female fetus. Repeat EFW 38 weeks. 20 EFW 7lb 13oz 77%.                RESOLVED: History of cholestasis during pregnancy ICD-10-CM: Z87.59, Z87.19  ICD-9-CM: V23.49, V12.79  3/9/2020 - 2022    Overview Addendum 2020  8:57 AM by LAM Denis     Delivered 10/2018 at 36 weeks due to cholestasis. Baseline CMP obtained 3/5/2020. ALT elevated at 62; otherwise normal CMP. Repeat labs on 2020 all WNL. RESOLVED: History of  delivery, currently pregnant ICD-10-CM: O09.899  ICD-9-CM: V23.89  3/4/2020 - 2022    Overview Addendum 2020  2:34 PM by Mercedes Pacheco, [facility] Habamber Pollard     2018- Induced at 36 weeks for cholestasis; . No PTL. RESOLVED: Nuchal cord affecting delivery ICD-10-CM: O69.81X0  ICD-9-CM: 663.83  10/18/2018 - 2018        RESOLVED: Cholestasis of pregnancy in third trimester ICD-10-CM: O26.613, K83.1  ICD-9-CM: 646.73, 576.8  10/15/2018 - 2018        RESOLVED: IUP (intrauterine pregnancy), incidental ICD-10-CM: Z33.1  ICD-9-CM: V22.2  2018 - 2018        RESOLVED: 35 weeks gestation of pregnancy ICD-10-CM: Z3A.35  ICD-9-CM: V22.2  2018 - 10/19/2018        RESOLVED: Irregular uterine contractions ICD-10-CM: O47.9  ICD-9-CM: 644.10  2018 - 10/19/2018        RESOLVED: Normal IUP (intrauterine pregnancy) on prenatal ultrasound ICD-10-CM: Z34.90  ICD-9-CM: V22.2  9/10/2018 - 10/19/2018        RESOLVED: Anemia during pregnancy in third trimester ICD-10-CM: O99.013  ICD-9-CM: 648.23  2018 - 2018    Overview Signed 2018  4:57 PM by Shoshana Skinner.1 on  - OTC iron             RESOLVED: Cholestasis during pregnancy in third trimester ICD-10-CM: O26.613, K83.1  ICD-9-CM: 646.73, 576.8  2018 - 2018    Overview Addendum 2018 12:44 PM by Artur Cormier MD     IOL scheduled for 10/15/18 @0530  Continue biweekly NST's with modified BPP. Can increase Ursodiol to 500 mg TID if needed. Increased 18.   Repeat BMZ course if deliver before 34 weeks               RESOLVED: Decreased fetal movement ICD-10-CM: O36.8190  ICD-9-CM: 655.70  2018 - 10/19/2018 RESOLVED: Cholecystolithiasis affecting pregnancy ICD-10-CM: O99.619, K80.20  ICD-9-CM: 646.80, 574.20  8/24/2018 - 8/26/2018        RESOLVED: Rubella non-immune status, antepartum ICD-10-CM: O09.899, Z28.39  ICD-9-CM: 646.83, V15.83  7/7/2018 - 11/19/2018        RESOLVED: Obesity affecting pregnancy in first trimester ICD-10-CM: O99.211  ICD-9-CM: 649.13  7/3/2018 - 8/23/2022    Overview Addendum 8/13/2020 10:12 AM by LAM Connors     Early 1-hour glucose= 107. Repeat again at 28 weeks  Morph ultrasound at [facility] if BMI =/> 35. Bi-weekly NSTs starting 37 weeks. Consent form signed 8/13/2020             RESOLVED: Supervision of high risk pregnancy in third trimester ICD-10-CM: O09.93  ICD-9-CM: V23.9  7/3/2018 - 10/19/2018        RESOLVED: Late prenatal care ICD-10-CM: O09.30  ICD-9-CM: V23.7  7/3/2018 - 10/19/2018    Overview Addendum 10/8/2018 12:45 PM by Diya Nicole MD     7/6/18  Female fetus. EFW 89%., LILLIANA 11/12/18.  8/27/18  EFW 86%. , 1571 grams. 9/11/18  EFW 68% at [facility]. Lab Results   Component Value Date/Time    Rubella, External Non-immune 07/03/2018 12:00 AM    GrBStrep, External negative 09/01/2020 12:00 AM     Recent Labs     08/24/22  0010   [facility] following the [facility]. Disposition: home    Discharge Condition: Good    Patient Instructions:   Current Discharge Medication List        CONTINUE these medications which have CHANGED    Details   ibuprofen (MOTRIN) 800 mg tablet Take 1 Tablet by mouth every eight (8) hours as needed (Pain scale 4-6). Qty: 30 Tablet, Refills: 1           CONTINUE these medications which have NOT CHANGED    Details   sertraline (ZOLOFT) 50 mg tablet TAKE 1/2 TABLET BY MOUTH DAILY FOR 4 DAYS, THEN TAKE 1 TABLET DAILY STARTING ON DAY 5  Qty: 30 Tab, Refills: 1    Associated Diagnoses: Anxiety      PNV Comb #2-Iron-FA-Omega 3 29-1-400 mg cmpk Take  by mouth.            STOP taking these medications       medroxyPROGESTERone (DEPO-PROVERA) 150 mg/mL injection Comments:   Reason for Stopping:         clotrimazole (GYNE-LOTRIMIN) 2 % vaginal cream Comments:   Reason for Stopping:         senna-docusate (PERICOLACE) 8.6-50 mg per tablet Comments:   Reason for Stopping:         ferrous sulfate (Iron) 325 mg (65 mg iron) tablet Comments:   Reason for Stopping:         diphenhydrAMINE (BENADRYL) 25 mg capsule Comments:   Reason for Stopping:               Reference my discharge instructions.     Follow-up Appointments   Procedures    FOLLOW UP VISIT Appointment in: 6 Weeks     Standing Status:   Standing     Number of Occurrences:   1     Order Specific Question:   Appointment in     Answer:   6 Weeks        Signed By:  Sybil Keys MD     August 24, 2022                       BST

## 2022-08-24 NOTE — PROGRESS NOTES
Post-Partum Day Number 1 Progress Note    Albina Bridges     Assessment:   Hospital Problems  Date Reviewed: 2022            Codes Class Noted POA    * (Principal) IUP (intrauterine pregnancy), incidental ICD-10-CM: Z33.1  ICD-9-CM: V22.2  2022 Yes        OCD (obsessive compulsive disorder) ICD-10-CM: F42.9  ICD-9-CM: 300.3  2020 Yes    Overview Signed 2020  2:05 PM by LAM Gregg     Started Zoloft 50 mg on 2020; counseling information given. Spontaneous vaginal delivery ICD-10-CM: O80  ICD-9-CM: 504  10/18/2018 No        Maternal varicella, non-immune ICD-10-CM: O09.899, Z28.39  ICD-9-CM: V49.89  2018 Yes    Overview Signed 2020  7:55 PM by Rad Rosas NP     Vaccinate postpartum               Doing well, post partum day 1    Plan:  1. Continue routine postpartum and perineal care as well as maternal education. 2.     Information for the patient's :  Jens Manley [[id number]]   Vaginal, Spontaneous  Patient doing well without significant complaint. Voiding without difficulty, normal lochia. Current Facility-Administered Medications   Medication Dose Route Frequency    varicella virus vaccine (live) (VARIVAX) injection 0.5 mL  0.5 mL SubCUTAneous ONCE       Vitals:  Visit Vitals  /78 (BP 1 Location: Left upper arm, BP Patient Position: At rest;Sitting)   Pulse 75   Temp 97.7 °F (36.5 °C)   Resp 18   Ht 5' 5\" (1.651 m)   SpO2 99%   Breastfeeding Unknown   BMI 32.12 kg/m²     Temp (24hrs), Av.2 °F (36.8 °C), Min:97.7 °F (36.5 °C), Max:98.6 °F (37 °C)        Exam:   Patient without distress. Abdomen soft, fundus firm, nontender                Perineum with normal lochia noted. Lower extremities are negative for swelling, cords or tenderness.     Labs:     Lab Results   Component Value Date/Time    WBC 15.2 (H) 2022 07:04 AM    WBC 11.8 2020 05:00 PM    WBC 10.2 2020 03:52 PM    HGB 11.2 (L) 08/24/2022 12:10 AM    HGB 12.8 08/23/2022 07:04 AM    HGB 10.9 (L) 09/27/2020 05:22 AM    HCT 35.0 08/24/2022 12:10 AM    HCT 38.9 08/23/2022 07:04 AM    HCT 34.5 (L) 09/27/2020 05:22 AM    PLATELET 265 61/19/2539 07:04 AM    PLATELET 646 23/67/2489 05:00 PM    PLATELET 686 27/73/4566 03:52 PM    Hgb, External 11.3 08/22/2018 12:00 AM    Hgb, External 11.9 07/03/2018 12:00 AM    Hct, External 36.0 07/03/2018 12:00 AM    Platelet cnt., External 363 07/03/2018 12:00 AM       Recent Results (from the past 24 hour(s))   HGB & HCT    Collection Time: 08/24/22 12:10 AM   Result Value Ref Range    HGB 11.2 (L) 12.0 - 16.0 g/dL    HCT 35.0 35.0 - 45.0 %

## 2022-08-24 NOTE — PROGRESS NOTES
1930 Bedside and Verbal shift change report given to Robbie Rodriguez, RN  & Joselyn Milligan, RN  (oncoming nurse) by MÓNICA Chambers (offgoing nurse). Report included the following information SBAR, Kardex, Intake/Output, MAR and Recent Results. 0715 Bedside and Verbal shift change report given to Dana Beatty RN  (oncoming nurse) by MÓNICA Ren RN (offgoing nurse). Report included the following information SBAR, Kardex, Intake/Output, MAR and Recent Results.

## 2024-08-07 ENCOUNTER — OFFICE VISIT (OUTPATIENT)
Facility: CLINIC | Age: 29
End: 2024-08-07
Payer: MEDICAID

## 2024-08-07 VITALS
TEMPERATURE: 99.6 F | DIASTOLIC BLOOD PRESSURE: 72 MMHG | HEIGHT: 65 IN | SYSTOLIC BLOOD PRESSURE: 120 MMHG | WEIGHT: 253 LBS | HEART RATE: 86 BPM | BODY MASS INDEX: 42.15 KG/M2 | RESPIRATION RATE: 16 BRPM | OXYGEN SATURATION: 98 %

## 2024-08-07 DIAGNOSIS — F42.2 MIXED OBSESSIONAL THOUGHTS AND ACTS: Primary | ICD-10-CM

## 2024-08-07 PROBLEM — K81.1 CHRONIC CHOLECYSTITIS: Status: ACTIVE | Noted: 2019-10-31

## 2024-08-07 PROCEDURE — 99204 OFFICE O/P NEW MOD 45 MIN: CPT | Performed by: STUDENT IN AN ORGANIZED HEALTH CARE EDUCATION/TRAINING PROGRAM

## 2024-08-07 RX ORDER — FLUOXETINE 10 MG/1
CAPSULE ORAL
Qty: 45 CAPSULE | Refills: 3 | Status: SHIPPED | OUTPATIENT
Start: 2024-08-07

## 2024-08-07 RX ORDER — FLUOXETINE HYDROCHLORIDE 40 MG/1
40 CAPSULE ORAL DAILY
Qty: 30 CAPSULE | Refills: 1 | Status: SHIPPED | OUTPATIENT
Start: 2024-08-07

## 2024-08-07 SDOH — ECONOMIC STABILITY: FOOD INSECURITY: WITHIN THE PAST 12 MONTHS, THE FOOD YOU BOUGHT JUST DIDN'T LAST AND YOU DIDN'T HAVE MONEY TO GET MORE.: PATIENT DECLINED

## 2024-08-07 SDOH — HEALTH STABILITY: PHYSICAL HEALTH: ON AVERAGE, HOW MANY DAYS PER WEEK DO YOU ENGAGE IN MODERATE TO STRENUOUS EXERCISE (LIKE A BRISK WALK)?: 2 DAYS

## 2024-08-07 SDOH — ECONOMIC STABILITY: INCOME INSECURITY: HOW HARD IS IT FOR YOU TO PAY FOR THE VERY BASICS LIKE FOOD, HOUSING, MEDICAL CARE, AND HEATING?: SOMEWHAT HARD

## 2024-08-07 SDOH — HEALTH STABILITY: PHYSICAL HEALTH: ON AVERAGE, HOW MANY MINUTES DO YOU ENGAGE IN EXERCISE AT THIS LEVEL?: 30 MIN

## 2024-08-07 SDOH — ECONOMIC STABILITY: HOUSING INSECURITY
IN THE LAST 12 MONTHS, WAS THERE A TIME WHEN YOU DID NOT HAVE A STEADY PLACE TO SLEEP OR SLEPT IN A SHELTER (INCLUDING NOW)?: YES

## 2024-08-07 SDOH — ECONOMIC STABILITY: FOOD INSECURITY: WITHIN THE PAST 12 MONTHS, YOU WORRIED THAT YOUR FOOD WOULD RUN OUT BEFORE YOU GOT MONEY TO BUY MORE.: PATIENT DECLINED

## 2024-08-07 ASSESSMENT — PATIENT HEALTH QUESTIONNAIRE - PHQ9
4. FEELING TIRED OR HAVING LITTLE ENERGY: NEARLY EVERY DAY
3. TROUBLE FALLING OR STAYING ASLEEP: NEARLY EVERY DAY
6. FEELING BAD ABOUT YOURSELF - OR THAT YOU ARE A FAILURE OR HAVE LET YOURSELF OR YOUR FAMILY DOWN: NEARLY EVERY DAY
5. POOR APPETITE OR OVEREATING: NEARLY EVERY DAY
9. THOUGHTS THAT YOU WOULD BE BETTER OFF DEAD, OR OF HURTING YOURSELF: NEARLY EVERY DAY
SUM OF ALL RESPONSES TO PHQ QUESTIONS 1-9: 23
1. LITTLE INTEREST OR PLEASURE IN DOING THINGS: NEARLY EVERY DAY
SUM OF ALL RESPONSES TO PHQ QUESTIONS 1-9: 23
SUM OF ALL RESPONSES TO PHQ QUESTIONS 1-9: 20
7. TROUBLE CONCENTRATING ON THINGS, SUCH AS READING THE NEWSPAPER OR WATCHING TELEVISION: MORE THAN HALF THE DAYS
SUM OF ALL RESPONSES TO PHQ QUESTIONS 1-9: 23
10. IF YOU CHECKED OFF ANY PROBLEMS, HOW DIFFICULT HAVE THESE PROBLEMS MADE IT FOR YOU TO DO YOUR WORK, TAKE CARE OF THINGS AT HOME, OR GET ALONG WITH OTHER PEOPLE: VERY DIFFICULT
8. MOVING OR SPEAKING SO SLOWLY THAT OTHER PEOPLE COULD HAVE NOTICED. OR THE OPPOSITE, BEING SO FIGETY OR RESTLESS THAT YOU HAVE BEEN MOVING AROUND A LOT MORE THAN USUAL: NEARLY EVERY DAY

## 2024-08-07 ASSESSMENT — COLUMBIA-SUICIDE SEVERITY RATING SCALE - C-SSRS
1. WITHIN THE PAST MONTH, HAVE YOU WISHED YOU WERE DEAD OR WISHED YOU COULD GO TO SLEEP AND NOT WAKE UP?: YES
6. HAVE YOU EVER DONE ANYTHING, STARTED TO DO ANYTHING, OR PREPARED TO DO ANYTHING TO END YOUR LIFE?: NO
2. HAVE YOU ACTUALLY HAD ANY THOUGHTS OF KILLING YOURSELF?: NO

## 2024-08-07 NOTE — PROGRESS NOTES
Rola Tompkins (: 1995) is a 28 y.o. female, new patient, here for evaluation of the following chief complaint(s):  Establish Care (Patient having some concerns about her OCD. Patient states it is getting out of hand again.)        Assessment & Plan  1. Obsessive-Compulsive Disorder (OCD).  Symptoms of OCD have worsened since the onset of the COVID-19 pandemic. Previously managed with Zoloft 100 mg, which provided limited relief. Prozac 10 mg has been prescribed, to be taken orally once daily for two weeks. The dosage will then be increased to 20 mg daily for the subsequent two weeks, and finally to 40 mg daily thereafter. Therapy sessions, including telehealth options, have been recommended for additional support. Discussed the importance of combining medication with therapy for better management of symptoms.      Results    1. Mixed obsessional thoughts and acts  -     External Referral To Psychology  -     FLUoxetine (PROZAC) 10 MG capsule; Take 1 capsule by mouth daily for 2 weeks, then increase to 2 caps daily for 2 more weeks, then increase to 40mg capsule., Disp-45 capsule, R-3Normal  -     FLUoxetine (PROZAC) 40 MG capsule; Take 1 capsule by mouth daily, Disp-30 capsule, R-1Normal    Return in about 6 weeks (around 2024) for mental health check.       Subjective   History of Present Illness  The patient presents for evaluation of Obsessive-Compulsive Disorder (OCD).    She has been grappling with OCD her entire life, but the onset of the COVID-19 pandemic in  exacerbated her symptoms. Previously, she was able to manage her condition without medication, but the severity of her symptoms has since increased. Her OCD primarily manifests as a compulsion to wash her hands and an aversion to touching objects or engaging in activities. She was prescribed Zoloft 100 mg by her OB/GYN, which provided some relief but was not fully effective. She has not tried any other medications for her OCD. She

## 2024-08-07 NOTE — PROGRESS NOTES
Chief Complaint   Patient presents with    Establish Care     Patient having some concerns about her OCD. Patient states it is getting out of hand again.       1. \"Have you been to the ER, urgent care clinic since your last visit?  Hospitalized since your last visit?\" No    2. \"Have you seen or consulted any other health care providers outside of the [facility] since your last visit?\" No     3. For patients aged 45-75: Has the patient had a colonoscopy / FIT/ Cologuard? NA - based on age      If the patient is female:    4. For patients aged 40-74: Has the patient had a mammogram within the past 2 years? NA - based on age or sex      5. For patients aged 21-65: Has the patient had a pap smear? No

## 2024-09-01 DIAGNOSIS — F42.2 MIXED OBSESSIONAL THOUGHTS AND ACTS: ICD-10-CM

## 2024-10-08 ENCOUNTER — OFFICE VISIT (OUTPATIENT)
Facility: CLINIC | Age: 29
End: 2024-10-08
Payer: MEDICAID

## 2024-10-08 VITALS
SYSTOLIC BLOOD PRESSURE: 127 MMHG | BODY MASS INDEX: 40.65 KG/M2 | HEART RATE: 83 BPM | OXYGEN SATURATION: 96 % | WEIGHT: 244 LBS | DIASTOLIC BLOOD PRESSURE: 84 MMHG | HEIGHT: 65 IN

## 2024-10-08 DIAGNOSIS — F42.2 MIXED OBSESSIONAL THOUGHTS AND ACTS: Primary | ICD-10-CM

## 2024-10-08 DIAGNOSIS — Z23 NEED FOR PROPHYLACTIC VACCINATION AND INOCULATION AGAINST VARICELLA: ICD-10-CM

## 2024-10-08 PROCEDURE — 99214 OFFICE O/P EST MOD 30 MIN: CPT | Performed by: STUDENT IN AN ORGANIZED HEALTH CARE EDUCATION/TRAINING PROGRAM

## 2024-10-08 RX ORDER — FLUOXETINE 40 MG/1
40 CAPSULE ORAL DAILY
Qty: 90 CAPSULE | Refills: 3 | Status: SHIPPED | OUTPATIENT
Start: 2024-10-08

## 2024-10-08 RX ORDER — FLUOXETINE 40 MG/1
40 CAPSULE ORAL DAILY
Qty: 90 CAPSULE | OUTPATIENT
Start: 2024-10-08

## 2024-10-08 NOTE — PROGRESS NOTES
Chief Complaint   Patient presents with    Follow-up     2 month follow up. Patient states that she feel like medication is working but is still having issues.      \"Have you been to the ER, urgent care clinic since your last visit?  Hospitalized since your last visit?\"    NO    “Have you seen or consulted any other health care providers outside our system since your last visit?”    NO     “Have you had a pap smear?”    NO    Date of last Cervical Cancer screen (HPV or PAP): 3/5/2020                 
as hand washing. Her fear of germs has lessened, but certain triggers remain, such as needing to change clothes and wash hands after receiving a package. She has not experienced any side effects from the Prozac, although it occasionally disrupts her sleep. She takes her medication at night.     She has also noticed an unintentional weight loss of 9 pounds.    She is still on Depo and does not plan on having more children at this time.  Per chart review, she is varicella nonimmune.  She is up-to-date on Pap smears..  [facility] with [facility]           Objective   Blood pressure 127/84, pulse 83, height 1.651 m (5' 5\"), weight 110.7 kg (244 lb), SpO2 96%.Body mass index is 40.6 kg/m².  General appearance - Alert, NAD, normal appearance  Head - Atraumatic. Normocephalic.   Eyes - EOMI. Sclera white.   Ears - Hearing grossly normal.    Respiratory - LCTAB. Effort normal, without respiratory distress. No wheeze/rale/rhonchi  Heart - Normal rate, regular rhythm. No m/r/r  Neurological - No gross focal deficits. Speech normal. Alert and oriented. Mental status is at baseline.   Musculoskeletal - Grossly normal ROM, Gait normal.    Skin - normal coloration and normal turgor.          Medical History- Reviewed Social History- Reviewed Surgical History- Reviewed   Rola has a past medical history of ADHD (attention deficit hyperactivity disorder), Anemia during pregnancy in third trimester, Anxiety, Cholestasis during pregnancy in third trimester, Depression, Obesity, and OCD (obsessive compulsive disorder). Rola reports that she has never smoked. She has never used smokeless tobacco. She reports that she does not drink alcohol and does not use drugs. Rola has a past surgical history that includes [place] tooth extraction and Cholecystectomy, laparoscopic (11/04/2019).         Problem List- Reviewed   Rola has Maternal varicella, non-immune; OCD (obsessive compulsive disorder); Chronic cholecystitis; and Need

## 2025-04-28 SDOH — ECONOMIC STABILITY: FOOD INSECURITY: WITHIN THE PAST 12 MONTHS, YOU WORRIED THAT YOUR FOOD WOULD RUN OUT BEFORE YOU GOT MONEY TO BUY MORE.: NEVER TRUE

## 2025-04-28 SDOH — ECONOMIC STABILITY: FOOD INSECURITY: WITHIN THE PAST 12 MONTHS, THE FOOD YOU BOUGHT JUST DIDN'T LAST AND YOU DIDN'T HAVE MONEY TO GET MORE.: NEVER TRUE

## 2025-04-28 SDOH — ECONOMIC STABILITY: INCOME INSECURITY: IN THE LAST 12 MONTHS, WAS THERE A TIME WHEN YOU WERE NOT ABLE TO PAY THE MORTGAGE OR RENT ON TIME?: NO

## 2025-04-28 SDOH — ECONOMIC STABILITY: TRANSPORTATION INSECURITY
IN THE PAST 12 MONTHS, HAS LACK OF TRANSPORTATION KEPT YOU FROM MEETINGS, WORK, OR FROM GETTING THINGS NEEDED FOR DAILY LIVING?: NO

## 2025-04-28 SDOH — ECONOMIC STABILITY: TRANSPORTATION INSECURITY
IN THE PAST 12 MONTHS, HAS THE LACK OF TRANSPORTATION KEPT YOU FROM MEDICAL APPOINTMENTS OR FROM GETTING MEDICATIONS?: NO

## 2025-05-20 ENCOUNTER — OFFICE VISIT (OUTPATIENT)
Facility: CLINIC | Age: 30
End: 2025-05-20
Payer: MEDICAID

## 2025-05-20 VITALS
SYSTOLIC BLOOD PRESSURE: 118 MMHG | RESPIRATION RATE: 14 BRPM | WEIGHT: 256 LBS | OXYGEN SATURATION: 95 % | HEIGHT: 65 IN | TEMPERATURE: 99.1 F | DIASTOLIC BLOOD PRESSURE: 80 MMHG | BODY MASS INDEX: 42.65 KG/M2 | HEART RATE: 85 BPM

## 2025-05-20 DIAGNOSIS — N91.5 OLIGOMENORRHEA, UNSPECIFIED TYPE: ICD-10-CM

## 2025-05-20 DIAGNOSIS — E65 DORSOCERVICAL FAT PAD: ICD-10-CM

## 2025-05-20 DIAGNOSIS — E65 CENTRAL OBESITY: ICD-10-CM

## 2025-05-20 DIAGNOSIS — F42.2 MIXED OBSESSIONAL THOUGHTS AND ACTS: Primary | ICD-10-CM

## 2025-05-20 PROBLEM — K81.1 CHRONIC CHOLECYSTITIS: Status: RESOLVED | Noted: 2019-10-31 | Resolved: 2025-05-20

## 2025-05-20 PROCEDURE — 99214 OFFICE O/P EST MOD 30 MIN: CPT | Performed by: STUDENT IN AN ORGANIZED HEALTH CARE EDUCATION/TRAINING PROGRAM

## 2025-05-20 RX ORDER — FLUOXETINE HYDROCHLORIDE 40 MG/1
40 CAPSULE ORAL DAILY
Qty: 90 CAPSULE | Refills: 3 | Status: SHIPPED | OUTPATIENT
Start: 2025-05-20

## 2025-05-20 SDOH — ECONOMIC STABILITY: FOOD INSECURITY: WITHIN THE PAST 12 MONTHS, THE FOOD YOU BOUGHT JUST DIDN'T LAST AND YOU DIDN'T HAVE MONEY TO GET MORE.: NEVER TRUE

## 2025-05-20 SDOH — ECONOMIC STABILITY: FOOD INSECURITY: WITHIN THE PAST 12 MONTHS, YOU WORRIED THAT YOUR FOOD WOULD RUN OUT BEFORE YOU GOT MONEY TO BUY MORE.: NEVER TRUE

## 2025-05-20 SDOH — ECONOMIC STABILITY: INCOME INSECURITY: IN THE LAST 12 MONTHS, WAS THERE A TIME WHEN YOU WERE NOT ABLE TO PAY THE MORTGAGE OR RENT ON TIME?: NO

## 2025-05-20 ASSESSMENT — PATIENT HEALTH QUESTIONNAIRE - PHQ9
1. LITTLE INTEREST OR PLEASURE IN DOING THINGS: NOT AT ALL
SUM OF ALL RESPONSES TO PHQ QUESTIONS 1-9: 0
SUM OF ALL RESPONSES TO PHQ9 QUESTIONS 1 & 2: 0
SUM OF ALL RESPONSES TO PHQ QUESTIONS 1-9: 0
2. FEELING DOWN, DEPRESSED OR HOPELESS: NOT AT ALL
1. LITTLE INTEREST OR PLEASURE IN DOING THINGS: NOT AT ALL
SUM OF ALL RESPONSES TO PHQ QUESTIONS 1-9: 0
2. FEELING DOWN, DEPRESSED OR HOPELESS: NOT AT ALL
SUM OF ALL RESPONSES TO PHQ QUESTIONS 1-9: 0

## 2025-05-20 NOTE — PROGRESS NOTES
Have you been to the ER, urgent care clinic since your last visit?  Hospitalized since your last visit?   NO    Have you seen or consulted any other health care providers outside our system since your last visit?   NO     “Have you had a pap smear?”    NO    Date of last Cervical Cancer screen (HPV or PAP): 3/5/2020              
improved but persist. She is on fluoxetine 40 mg and requires a refill.    She has not had a Pap smear since 2021. She maintains regular OB/GYN appointments. She was on Depo-Provera for 3 months in 2023, following childbirth in 2022. She is not using birth control as her partner had a vasectomy in 12/2024. No post-vasectomy complications. No menstrual period since 2022, prior to pregnancy. No facial hair growth or shaving needed.    Her weight has fluctuated, with a 9-pound loss noted at the last visit. Despite walking and dietary modifications, she struggles to maintain weight loss. Weight increased significantly post-childbirth. No bright purple stretch marks.    GYNECOLOGICAL HISTORY:  - Last Menstrual Period: 2022           Objective   Blood pressure 118/80, pulse 85, temperature 99.1 °F (37.3 °C), temperature source Tympanic, resp. rate 14, height 1.651 m (5' 5\"), weight 116.1 kg (256 lb), SpO2 95%.Body mass index is 42.6 kg/m².  General appearance - Alert, NAD, central obesity, dorsocervical fat pad.   Head - Atraumatic. Normocephalic.   Eyes - EOMI. Sclera white.   Ears - Hearing grossly normal.    Respiratory - LCTAB. Effort normal, without respiratory distress. No wheeze/rale/rhonchi  Heart - Normal rate, regular rhythm. No m/r/r  Neurological - No gross focal deficits. Speech normal. Alert and oriented. Mental status is at baseline.   Musculoskeletal - Grossly normal ROM, Gait normal.    Skin - normal coloration and normal turgor.          Medical History- Reviewed Social History- Reviewed Surgical History- Reviewed   Rola has a past medical history of ADHD (attention deficit hyperactivity disorder), Anemia during pregnancy in third trimester, Anxiety, Cholestasis during pregnancy in third trimester, Chronic cholecystitis, Depression, Obesity, and OCD (obsessive compulsive disorder). Rola reports that she has never smoked. She has never used smokeless tobacco. She reports that she does not drink alcohol

## 2025-05-21 LAB
A/G RATIO: 1.6 RATIO (ref 1.1–2.6)
ALBUMIN: 4.4 G/DL (ref 3.5–5)
ALP BLD-CCNC: 66 U/L (ref 25–115)
ALT SERPL-CCNC: 32 U/L (ref 5–40)
ANION GAP SERPL CALCULATED.3IONS-SCNC: 11 MMOL/L (ref 3–15)
AST SERPL-CCNC: 25 U/L (ref 10–37)
BILIRUB SERPL-MCNC: 0.3 MG/DL (ref 0.2–1.2)
BUN BLDV-MCNC: 11 MG/DL (ref 6–22)
CALCIUM SERPL-MCNC: 10.5 MG/DL (ref 8.4–10.5)
CHLORIDE BLD-SCNC: 108 MMOL/L (ref 98–110)
CHOLESTEROL, TOTAL: 158 MG/DL (ref 110–200)
CHOLESTEROL/HDL RATIO: 4.5 (ref 0–5)
CO2: 25 MMOL/L (ref 20–32)
CREAT SERPL-MCNC: 0.9 MG/DL (ref 0.5–1.2)
ESTIMATED AVERAGE GLUCOSE: 119 MG/DL (ref 91–123)
FOLLICLE STIMULATING HORMONE: 4.6 MIU/ML
GFR, ESTIMATED: 82.9 ML/MIN/1.73 SQ.M.
GLOBULIN: 2.7 G/DL (ref 2–4)
GLUCOSE: 110 MG/DL (ref 70–99)
HBA1C MFR BLD: 5.8 % (ref 4.8–5.6)
HCT VFR BLD CALC: 47.4 % (ref 35.1–46.5)
HDLC SERPL-MCNC: 35 MG/DL
HEMOGLOBIN: 14.2 G/DL (ref 11.7–15.5)
LDL CHOLESTEROL: 108 MG/DL (ref 50–99)
LDL/HDL RATIO: 3.1
LUTEINIZING HORMONE: 9.5 MIU/ML
MCH RBC QN AUTO: 28 PG (ref 26–34)
MCHC RBC AUTO-ENTMCNC: 30 G/DL (ref 31–36)
MCV RBC AUTO: 94 FL (ref 80–99)
NON-HDL CHOLESTEROL: 123 MG/DL
PDW BLD-RTO: 13.2 % (ref 10–15.5)
PLATELET # BLD: 405 K/UL (ref 140–440)
PMV BLD AUTO: 9.9 FL (ref 9–13)
POTASSIUM SERPL-SCNC: 5.9 MMOL/L (ref 3.5–5.5)
PROLACTIN: 4.5 NG/ML (ref 4.8–23.3)
RBC # BLD: 5.07 M/UL (ref 3.8–5.2)
SODIUM BLD-SCNC: 144 MMOL/L (ref 133–145)
T4 FREE: 1.1 NG/DL (ref 0.9–1.8)
TOTAL PROTEIN: 7.1 G/DL (ref 6.4–8.3)
TRIGL SERPL-MCNC: 71 MG/DL (ref 40–149)
TSH SERPL DL<=0.05 MIU/L-ACNC: 0.88 MCU/ML (ref 0.27–4.2)
VLDLC SERPL CALC-MCNC: 14 MG/DL (ref 8–30)
WBC # BLD: 10.7 K/UL (ref 4–11)

## 2025-05-28 DIAGNOSIS — N91.5 OLIGOMENORRHEA, UNSPECIFIED TYPE: ICD-10-CM

## 2025-05-28 DIAGNOSIS — E65 CENTRAL OBESITY: ICD-10-CM

## 2025-05-28 DIAGNOSIS — E65 DORSOCERVICAL FAT PAD: ICD-10-CM

## 2025-06-04 LAB
CORTISOL (UR), FREE: 28.7
CORTISOL 24H URINARY FREE: 44.7 MCG/24 H (ref 4–50)
CREATININE 24 HOUR URINE: 1.56 G/24 H (ref 0.5–2.15)
TOTAL VOLUME: 1661 ML

## 2025-07-01 ENCOUNTER — TELEMEDICINE (OUTPATIENT)
Facility: CLINIC | Age: 30
End: 2025-07-01
Payer: MEDICAID

## 2025-07-01 ENCOUNTER — TELEPHONE (OUTPATIENT)
Facility: CLINIC | Age: 30
End: 2025-07-01

## 2025-07-01 DIAGNOSIS — N91.5 OLIGOMENORRHEA, UNSPECIFIED TYPE: Primary | ICD-10-CM

## 2025-07-01 PROCEDURE — 99214 OFFICE O/P EST MOD 30 MIN: CPT | Performed by: STUDENT IN AN ORGANIZED HEALTH CARE EDUCATION/TRAINING PROGRAM

## 2025-07-01 ASSESSMENT — PATIENT HEALTH QUESTIONNAIRE - PHQ9
SUM OF ALL RESPONSES TO PHQ9 QUESTIONS 1 & 2: 0
1. LITTLE INTEREST OR PLEASURE IN DOING THINGS: NOT AT ALL
SUM OF ALL RESPONSES TO PHQ QUESTIONS 1-9: 0
1. LITTLE INTEREST OR PLEASURE IN DOING THINGS: NOT AT ALL
SUM OF ALL RESPONSES TO PHQ QUESTIONS 1-9: 0
2. FEELING DOWN, DEPRESSED OR HOPELESS: NOT AT ALL
2. FEELING DOWN, DEPRESSED OR HOPELESS: NOT AT ALL
SUM OF ALL RESPONSES TO PHQ QUESTIONS 1-9: 0
SUM OF ALL RESPONSES TO PHQ QUESTIONS 1-9: 0

## 2025-07-01 NOTE — TELEPHONE ENCOUNTER
Called patient to get checked in for virtual visit, no answer left message if patient received this message within the next 10 mins to please give the office a call back and left office number.

## 2025-07-01 NOTE — PROGRESS NOTES
Rola Tompkins (: 1995) is a 29 y.o. female, established patient, here for evaluation of the following chief complaint(s):   No chief complaint on file.          Assessment & Plan  1.  Oligomenorrhea.  High suspicion of PCOS  - Reviewed blood work: satisfactory. Urinary cortisol test: no concerns for Cushing's. CBC, cholesterol, kidney, liver, and thyroid functions: normal. Blood glucose: slightly elevated, not diabetic. Pending testosterone level. History of irregular periods and recent spotting suggest PCOS. Amenorrhea  raises cancer risk.  - Discussed PCOS: infrequent periods, ovarian cysts, hormonal imbalances. Emphasized importance of regular menstruation. Discussed birth control pills to regulate hormones and restore menstrual cycle.  - Ordered repeat testosterone lab and ovarian ultrasound for cysts.  - Referred to hospital for lab work.  - If above testing confirms PCOS, would initiate metformin given this and elevated sugar    Follow-up  - Follow up after lab and ultrasound results are in to decide on treatment.    Results  - Labs:    - Urinary cortisol: No concerns for Cushing's    - CBC: Normal    - Cholesterol: Normal    - Kidney function: Normal    - Liver function: Normal    - Thyroid function: Normal    - Blood glucose: Slightly elevated  1. Oligomenorrhea, unspecified type  -     Testosterone and SHBG; Future  -     US NON OB TRANSVAGINAL; Future    Return for When ultrasound and lab is completed.         Subjective   History of Present Illness  The patient presents via virtual visit for follow-up on blood work.    She has experienced amenorrhea for several years, with occasional spotting in the past 6 weeks. She has noticed a few new chin hairs but no significant changes in hair growth patterns. Her partner's vasectomy eliminates the need for contraception. She recalls her menstrual cycle occurred every few months on oral contraceptives and ceased entirely on Depo-Provera.

## 2025-07-01 NOTE — PATIENT INSTRUCTIONS
[facility]    [facility] has convenient patient service centers throughout [facility] with hours designed to fit any schedule.   No appointment necessary.    Note: Hours are subject to change. Please call ahead to check hours.    [facility]  [address] [place] [address]  [phone]  Mon.-Fri., 7 a.m.- 4:30 p.m.  Sat., 7 a.m.- 11:30 a.m.     [facility]  [address] [place] [address]  [phone]  Mon.-Fri., 7 a.m.- 5:30 p.m.    [facility]  [address] [place] [id number] [phone]  Mon.-Fri., 6:30 a.m.-4:30 p.m.    [facility]  [address] [place] [address]  [phone]  Mon.-Friday., 7 a.m.- 5:30 p.m.  Saturday 9:00 a.m. to 11:30 a.m.